# Patient Record
Sex: FEMALE | Race: WHITE | NOT HISPANIC OR LATINO | Employment: OTHER | URBAN - METROPOLITAN AREA
[De-identification: names, ages, dates, MRNs, and addresses within clinical notes are randomized per-mention and may not be internally consistent; named-entity substitution may affect disease eponyms.]

---

## 2017-01-10 ENCOUNTER — COMMUNICATION - HEALTHEAST (OUTPATIENT)
Dept: FAMILY MEDICINE | Facility: CLINIC | Age: 82
End: 2017-01-10

## 2017-01-11 ENCOUNTER — COMMUNICATION - HEALTHEAST (OUTPATIENT)
Dept: FAMILY MEDICINE | Facility: CLINIC | Age: 82
End: 2017-01-11

## 2017-01-31 ENCOUNTER — COMMUNICATION - HEALTHEAST (OUTPATIENT)
Dept: FAMILY MEDICINE | Facility: CLINIC | Age: 82
End: 2017-01-31

## 2017-01-31 ENCOUNTER — RECORDS - HEALTHEAST (OUTPATIENT)
Dept: ADMINISTRATIVE | Facility: OTHER | Age: 82
End: 2017-01-31

## 2017-02-02 ENCOUNTER — OFFICE VISIT - HEALTHEAST (OUTPATIENT)
Dept: FAMILY MEDICINE | Facility: CLINIC | Age: 82
End: 2017-02-02

## 2017-02-02 DIAGNOSIS — J45.40 ASTHMA, CHRONIC, MODERATE PERSISTENT, UNCOMPLICATED: ICD-10-CM

## 2017-02-02 DIAGNOSIS — R22.1 LOCALIZED SWELLING, MASS AND LUMP, NECK: ICD-10-CM

## 2017-02-02 DIAGNOSIS — I10 HYPERTENSION: ICD-10-CM

## 2017-02-02 ASSESSMENT — MIFFLIN-ST. JEOR: SCORE: 824.07

## 2017-02-03 ENCOUNTER — COMMUNICATION - HEALTHEAST (OUTPATIENT)
Dept: FAMILY MEDICINE | Facility: CLINIC | Age: 82
End: 2017-02-03

## 2017-02-07 ENCOUNTER — COMMUNICATION - HEALTHEAST (OUTPATIENT)
Dept: FAMILY MEDICINE | Facility: CLINIC | Age: 82
End: 2017-02-07

## 2017-02-07 ENCOUNTER — HOSPITAL ENCOUNTER (OUTPATIENT)
Dept: ULTRASOUND IMAGING | Facility: HOSPITAL | Age: 82
Discharge: HOME | End: 2017-02-07
Attending: FAMILY MEDICINE

## 2017-02-07 DIAGNOSIS — R22.1 LOCALIZED SWELLING, MASS AND LUMP, NECK: ICD-10-CM

## 2017-02-28 ENCOUNTER — COMMUNICATION - HEALTHEAST (OUTPATIENT)
Dept: FAMILY MEDICINE | Facility: CLINIC | Age: 82
End: 2017-02-28

## 2017-03-07 ENCOUNTER — OFFICE VISIT - HEALTHEAST (OUTPATIENT)
Dept: FAMILY MEDICINE | Facility: CLINIC | Age: 82
End: 2017-03-07

## 2017-03-07 DIAGNOSIS — I10 ESSENTIAL HYPERTENSION WITH GOAL BLOOD PRESSURE LESS THAN 130/80: ICD-10-CM

## 2017-03-07 ASSESSMENT — MIFFLIN-ST. JEOR: SCORE: 824.07

## 2017-03-21 ENCOUNTER — COMMUNICATION - HEALTHEAST (OUTPATIENT)
Dept: FAMILY MEDICINE | Facility: CLINIC | Age: 82
End: 2017-03-21

## 2017-03-21 DIAGNOSIS — J45.40 ASTHMA, CHRONIC, MODERATE PERSISTENT, UNCOMPLICATED: ICD-10-CM

## 2017-04-25 ENCOUNTER — AMBULATORY - HEALTHEAST (OUTPATIENT)
Dept: ENDOCRINOLOGY | Facility: CLINIC | Age: 82
End: 2017-04-25

## 2017-04-25 DIAGNOSIS — M81.0 OSTEOPOROSIS: ICD-10-CM

## 2017-05-03 ENCOUNTER — RECORDS - HEALTHEAST (OUTPATIENT)
Dept: BONE DENSITY | Facility: CLINIC | Age: 82
End: 2017-05-03

## 2017-05-03 ENCOUNTER — RECORDS - HEALTHEAST (OUTPATIENT)
Dept: ADMINISTRATIVE | Facility: OTHER | Age: 82
End: 2017-05-03

## 2017-05-03 DIAGNOSIS — M81.0 AGE-RELATED OSTEOPOROSIS WITHOUT CURRENT PATHOLOGICAL FRACTURE: ICD-10-CM

## 2017-05-09 ENCOUNTER — AMBULATORY - HEALTHEAST (OUTPATIENT)
Dept: ENDOCRINOLOGY | Facility: CLINIC | Age: 82
End: 2017-05-09

## 2017-05-09 ENCOUNTER — COMMUNICATION - HEALTHEAST (OUTPATIENT)
Dept: ADMINISTRATIVE | Facility: CLINIC | Age: 82
End: 2017-05-09

## 2017-05-09 ENCOUNTER — OFFICE VISIT - HEALTHEAST (OUTPATIENT)
Dept: ENDOCRINOLOGY | Facility: CLINIC | Age: 82
End: 2017-05-09

## 2017-05-09 DIAGNOSIS — M81.0 OSTEOPOROSIS: ICD-10-CM

## 2017-05-09 DIAGNOSIS — E21.3 HYPERPARATHYROIDISM, UNSPECIFIED (H): ICD-10-CM

## 2017-05-09 ASSESSMENT — MIFFLIN-ST. JEOR: SCORE: 805.93

## 2017-05-17 ENCOUNTER — INFUSION - HEALTHEAST (OUTPATIENT)
Dept: INFUSION THERAPY | Facility: HOSPITAL | Age: 82
End: 2017-05-17

## 2017-05-17 DIAGNOSIS — M81.0 OSTEOPOROSIS: ICD-10-CM

## 2017-05-17 LAB
CREAT SERPL-MCNC: 0.69 MG/DL (ref 0.6–1.1)
GFR SERPL CREATININE-BSD FRML MDRD: >60 ML/MIN/1.73M2

## 2017-05-30 ENCOUNTER — COMMUNICATION - HEALTHEAST (OUTPATIENT)
Dept: ENDOCRINOLOGY | Facility: CLINIC | Age: 82
End: 2017-05-30

## 2017-06-07 ENCOUNTER — COMMUNICATION - HEALTHEAST (OUTPATIENT)
Dept: FAMILY MEDICINE | Facility: CLINIC | Age: 82
End: 2017-06-07

## 2017-06-07 DIAGNOSIS — J45.40 ASTHMA, CHRONIC, MODERATE PERSISTENT, UNCOMPLICATED: ICD-10-CM

## 2017-06-12 ENCOUNTER — COMMUNICATION - HEALTHEAST (OUTPATIENT)
Dept: FAMILY MEDICINE | Facility: CLINIC | Age: 82
End: 2017-06-12

## 2017-06-13 ENCOUNTER — HOME CARE/HOSPICE - HEALTHEAST (OUTPATIENT)
Dept: HOME HEALTH SERVICES | Facility: HOME HEALTH | Age: 82
End: 2017-06-13

## 2017-06-14 ENCOUNTER — COMMUNICATION - HEALTHEAST (OUTPATIENT)
Dept: FAMILY MEDICINE | Facility: CLINIC | Age: 82
End: 2017-06-14

## 2017-06-15 ENCOUNTER — COMMUNICATION - HEALTHEAST (OUTPATIENT)
Dept: HOME HEALTH SERVICES | Facility: HOME HEALTH | Age: 82
End: 2017-06-15

## 2017-06-16 ENCOUNTER — COMMUNICATION - HEALTHEAST (OUTPATIENT)
Dept: INTENSIVE CARE | Facility: CLINIC | Age: 82
End: 2017-06-16

## 2017-06-16 ENCOUNTER — COMMUNICATION - HEALTHEAST (OUTPATIENT)
Dept: FAMILY MEDICINE | Facility: CLINIC | Age: 82
End: 2017-06-16

## 2017-06-27 ENCOUNTER — RECORDS - HEALTHEAST (OUTPATIENT)
Dept: ADMINISTRATIVE | Facility: OTHER | Age: 82
End: 2017-06-27

## 2017-07-05 ENCOUNTER — COMMUNICATION - HEALTHEAST (OUTPATIENT)
Dept: FAMILY MEDICINE | Facility: CLINIC | Age: 82
End: 2017-07-05

## 2017-07-05 DIAGNOSIS — M54.50 LUMBAGO: ICD-10-CM

## 2017-07-20 ENCOUNTER — COMMUNICATION - HEALTHEAST (OUTPATIENT)
Dept: FAMILY MEDICINE | Facility: CLINIC | Age: 82
End: 2017-07-20

## 2017-07-20 DIAGNOSIS — J45.909 ASTHMA: ICD-10-CM

## 2017-07-23 ENCOUNTER — COMMUNICATION - HEALTHEAST (OUTPATIENT)
Dept: FAMILY MEDICINE | Facility: CLINIC | Age: 82
End: 2017-07-23

## 2017-07-23 DIAGNOSIS — M54.50 LUMBAGO: ICD-10-CM

## 2017-08-02 ENCOUNTER — COMMUNICATION - HEALTHEAST (OUTPATIENT)
Dept: FAMILY MEDICINE | Facility: CLINIC | Age: 82
End: 2017-08-02

## 2017-08-02 DIAGNOSIS — M54.50 LUMBAGO: ICD-10-CM

## 2017-08-08 ENCOUNTER — OFFICE VISIT - HEALTHEAST (OUTPATIENT)
Dept: FAMILY MEDICINE | Facility: CLINIC | Age: 82
End: 2017-08-08

## 2017-08-08 DIAGNOSIS — H91.93 HEARING LOSS, BILATERAL: ICD-10-CM

## 2017-08-08 DIAGNOSIS — H61.23 BILATERAL IMPACTED CERUMEN: ICD-10-CM

## 2017-10-04 ENCOUNTER — AMBULATORY - HEALTHEAST (OUTPATIENT)
Dept: NURSING | Facility: CLINIC | Age: 82
End: 2017-10-04

## 2017-10-04 DIAGNOSIS — Z23 NEED FOR IMMUNIZATION AGAINST INFLUENZA: ICD-10-CM

## 2017-10-07 ENCOUNTER — COMMUNICATION - HEALTHEAST (OUTPATIENT)
Dept: FAMILY MEDICINE | Facility: CLINIC | Age: 82
End: 2017-10-07

## 2017-10-07 DIAGNOSIS — M54.50 LUMBAGO: ICD-10-CM

## 2017-10-25 ENCOUNTER — RECORDS - HEALTHEAST (OUTPATIENT)
Dept: ADMINISTRATIVE | Facility: OTHER | Age: 82
End: 2017-10-25

## 2017-11-03 ENCOUNTER — COMMUNICATION - HEALTHEAST (OUTPATIENT)
Dept: FAMILY MEDICINE | Facility: CLINIC | Age: 82
End: 2017-11-03

## 2017-11-03 DIAGNOSIS — J45.40 ASTHMA, CHRONIC, MODERATE PERSISTENT, UNCOMPLICATED: ICD-10-CM

## 2017-11-07 ENCOUNTER — COMMUNICATION - HEALTHEAST (OUTPATIENT)
Dept: FAMILY MEDICINE | Facility: CLINIC | Age: 82
End: 2017-11-07

## 2017-11-07 DIAGNOSIS — J45.40 ASTHMA, CHRONIC, MODERATE PERSISTENT, UNCOMPLICATED: ICD-10-CM

## 2017-11-22 ENCOUNTER — COMMUNICATION - HEALTHEAST (OUTPATIENT)
Dept: FAMILY MEDICINE | Facility: CLINIC | Age: 82
End: 2017-11-22

## 2017-11-22 DIAGNOSIS — M54.50 LUMBAGO: ICD-10-CM

## 2017-12-20 ENCOUNTER — COMMUNICATION - HEALTHEAST (OUTPATIENT)
Dept: FAMILY MEDICINE | Facility: CLINIC | Age: 82
End: 2017-12-20

## 2017-12-20 DIAGNOSIS — M54.50 LUMBAGO: ICD-10-CM

## 2017-12-22 ENCOUNTER — COMMUNICATION - HEALTHEAST (OUTPATIENT)
Dept: FAMILY MEDICINE | Facility: CLINIC | Age: 82
End: 2017-12-22

## 2017-12-22 DIAGNOSIS — J45.40 ASTHMA, CHRONIC, MODERATE PERSISTENT, UNCOMPLICATED: ICD-10-CM

## 2018-01-13 ENCOUNTER — COMMUNICATION - HEALTHEAST (OUTPATIENT)
Dept: FAMILY MEDICINE | Facility: CLINIC | Age: 83
End: 2018-01-13

## 2018-01-13 DIAGNOSIS — J45.40 ASTHMA, CHRONIC, MODERATE PERSISTENT, UNCOMPLICATED: ICD-10-CM

## 2018-01-22 ENCOUNTER — COMMUNICATION - HEALTHEAST (OUTPATIENT)
Dept: FAMILY MEDICINE | Facility: CLINIC | Age: 83
End: 2018-01-22

## 2018-01-22 DIAGNOSIS — M54.50 LUMBAGO: ICD-10-CM

## 2018-02-08 ENCOUNTER — COMMUNICATION - HEALTHEAST (OUTPATIENT)
Dept: FAMILY MEDICINE | Facility: CLINIC | Age: 83
End: 2018-02-08

## 2018-02-08 DIAGNOSIS — J45.40 ASTHMA, CHRONIC, MODERATE PERSISTENT, UNCOMPLICATED: ICD-10-CM

## 2018-03-07 ENCOUNTER — COMMUNICATION - HEALTHEAST (OUTPATIENT)
Dept: FAMILY MEDICINE | Facility: CLINIC | Age: 83
End: 2018-03-07

## 2018-03-07 DIAGNOSIS — I10 ESSENTIAL HYPERTENSION: ICD-10-CM

## 2018-03-08 ENCOUNTER — COMMUNICATION - HEALTHEAST (OUTPATIENT)
Dept: FAMILY MEDICINE | Facility: CLINIC | Age: 83
End: 2018-03-08

## 2018-03-08 DIAGNOSIS — J45.40 ASTHMA, CHRONIC, MODERATE PERSISTENT, UNCOMPLICATED: ICD-10-CM

## 2018-03-13 ENCOUNTER — COMMUNICATION - HEALTHEAST (OUTPATIENT)
Dept: FAMILY MEDICINE | Facility: CLINIC | Age: 83
End: 2018-03-13

## 2018-03-13 DIAGNOSIS — I10 ESSENTIAL HYPERTENSION: ICD-10-CM

## 2018-03-20 ENCOUNTER — OFFICE VISIT - HEALTHEAST (OUTPATIENT)
Dept: FAMILY MEDICINE | Facility: CLINIC | Age: 83
End: 2018-03-20

## 2018-03-20 ENCOUNTER — COMMUNICATION - HEALTHEAST (OUTPATIENT)
Dept: TELEHEALTH | Facility: CLINIC | Age: 83
End: 2018-03-20

## 2018-03-20 DIAGNOSIS — J44.9 CHRONIC OBSTRUCTIVE PULMONARY DISEASE, UNSPECIFIED COPD TYPE (H): ICD-10-CM

## 2018-03-20 DIAGNOSIS — E21.3 HYPERPARATHYROIDISM (H): ICD-10-CM

## 2018-03-20 DIAGNOSIS — I10 ESSENTIAL HYPERTENSION: ICD-10-CM

## 2018-03-20 DIAGNOSIS — R26.9 UNSPECIFIED ABNORMALITIES OF GAIT AND MOBILITY: ICD-10-CM

## 2018-03-20 DIAGNOSIS — K43.9 VENTRAL HERNIA WITHOUT OBSTRUCTION OR GANGRENE: ICD-10-CM

## 2018-03-20 DIAGNOSIS — J45.40 ASTHMA, CHRONIC, MODERATE PERSISTENT, UNCOMPLICATED: ICD-10-CM

## 2018-03-20 DIAGNOSIS — B44.81 ALLERGIC BRONCHOPULMONARY ASPERGILLOSIS (H): ICD-10-CM

## 2018-03-20 DIAGNOSIS — S06.9XAA TRAUMATIC BRAIN INJURY (H): ICD-10-CM

## 2018-03-20 DIAGNOSIS — J45.909 ASTHMA: ICD-10-CM

## 2018-03-20 DIAGNOSIS — N39.41 URGE INCONTINENCE: ICD-10-CM

## 2018-03-20 ASSESSMENT — MIFFLIN-ST. JEOR: SCORE: 783.81

## 2018-03-21 ENCOUNTER — COMMUNICATION - HEALTHEAST (OUTPATIENT)
Dept: ADMINISTRATIVE | Facility: CLINIC | Age: 83
End: 2018-03-21

## 2018-03-21 DIAGNOSIS — B44.81 ABPA (ALLERGIC BRONCHOPULMONARY ASPERGILLOSIS) (H): ICD-10-CM

## 2018-03-22 ENCOUNTER — AMBULATORY - HEALTHEAST (OUTPATIENT)
Dept: FAMILY MEDICINE | Facility: CLINIC | Age: 83
End: 2018-03-22

## 2018-03-22 DIAGNOSIS — B44.81 ABPA (ALLERGIC BRONCHOPULMONARY ASPERGILLOSIS) (H): ICD-10-CM

## 2018-04-16 ENCOUNTER — AMBULATORY - HEALTHEAST (OUTPATIENT)
Dept: SCHEDULING | Facility: CLINIC | Age: 83
End: 2018-04-16

## 2018-04-16 DIAGNOSIS — M81.0 OSTEOPOROSIS: ICD-10-CM

## 2018-06-11 ENCOUNTER — COMMUNICATION - HEALTHEAST (OUTPATIENT)
Dept: FAMILY MEDICINE | Facility: CLINIC | Age: 83
End: 2018-06-11

## 2018-06-11 ENCOUNTER — AMBULATORY - HEALTHEAST (OUTPATIENT)
Dept: MEDSURG UNIT | Facility: HOSPITAL | Age: 83
End: 2018-06-11

## 2018-06-11 ENCOUNTER — OFFICE VISIT - HEALTHEAST (OUTPATIENT)
Dept: FAMILY MEDICINE | Facility: CLINIC | Age: 83
End: 2018-06-11

## 2018-06-11 ENCOUNTER — AMBULATORY - HEALTHEAST (OUTPATIENT)
Dept: FAMILY MEDICINE | Facility: CLINIC | Age: 83
End: 2018-06-11

## 2018-06-11 ENCOUNTER — HOME CARE/HOSPICE - HEALTHEAST (OUTPATIENT)
Dept: HOME HEALTH SERVICES | Facility: HOME HEALTH | Age: 83
End: 2018-06-11

## 2018-06-11 DIAGNOSIS — M79.605 LEFT LEG PAIN: ICD-10-CM

## 2018-06-11 DIAGNOSIS — M71.22 BAKER'S CYST OF KNEE, LEFT: ICD-10-CM

## 2018-06-11 DIAGNOSIS — M54.2 NECK PAIN: ICD-10-CM

## 2018-06-11 DIAGNOSIS — S43.001A SUBLUXATION OF RIGHT SHOULDER JOINT, INITIAL ENCOUNTER: ICD-10-CM

## 2018-06-11 DIAGNOSIS — M79.601 RIGHT ARM PAIN: ICD-10-CM

## 2018-06-11 DIAGNOSIS — S06.9XAA TRAUMATIC BRAIN INJURY (H): ICD-10-CM

## 2018-06-11 DIAGNOSIS — M25.511 RIGHT SHOULDER PAIN: ICD-10-CM

## 2018-06-12 ENCOUNTER — COMMUNICATION - HEALTHEAST (OUTPATIENT)
Dept: FAMILY MEDICINE | Facility: CLINIC | Age: 83
End: 2018-06-12

## 2018-06-14 ENCOUNTER — COMMUNICATION - HEALTHEAST (OUTPATIENT)
Dept: HOME HEALTH SERVICES | Facility: HOME HEALTH | Age: 83
End: 2018-06-14

## 2018-06-15 ENCOUNTER — HOME CARE/HOSPICE - HEALTHEAST (OUTPATIENT)
Dept: HOME HEALTH SERVICES | Facility: HOME HEALTH | Age: 83
End: 2018-06-15

## 2018-06-15 ENCOUNTER — COMMUNICATION - HEALTHEAST (OUTPATIENT)
Dept: HOME HEALTH SERVICES | Facility: HOME HEALTH | Age: 83
End: 2018-06-15

## 2018-06-19 ENCOUNTER — RECORDS - HEALTHEAST (OUTPATIENT)
Dept: ADMINISTRATIVE | Facility: OTHER | Age: 83
End: 2018-06-19

## 2018-06-21 ENCOUNTER — HOME CARE/HOSPICE - HEALTHEAST (OUTPATIENT)
Dept: HOME HEALTH SERVICES | Facility: HOME HEALTH | Age: 83
End: 2018-06-21

## 2018-06-22 ENCOUNTER — HOME CARE/HOSPICE - HEALTHEAST (OUTPATIENT)
Dept: HOME HEALTH SERVICES | Facility: HOME HEALTH | Age: 83
End: 2018-06-22

## 2018-06-25 ENCOUNTER — HOME CARE/HOSPICE - HEALTHEAST (OUTPATIENT)
Dept: HOME HEALTH SERVICES | Facility: HOME HEALTH | Age: 83
End: 2018-06-25

## 2018-06-27 ENCOUNTER — RECORDS - HEALTHEAST (OUTPATIENT)
Dept: ADMINISTRATIVE | Facility: OTHER | Age: 83
End: 2018-06-27

## 2018-07-03 ENCOUNTER — HOSPITAL ENCOUNTER (OUTPATIENT)
Dept: RADIOLOGY | Facility: HOSPITAL | Age: 83
Discharge: HOME | End: 2018-07-03

## 2018-07-03 ENCOUNTER — HOSPITAL ENCOUNTER (OUTPATIENT)
Dept: MRI IMAGING | Facility: HOSPITAL | Age: 83
Discharge: HOME | End: 2018-07-03
Admitting: RADIOLOGY

## 2018-07-03 DIAGNOSIS — M81.0 OSTEOPOROSIS: ICD-10-CM

## 2018-07-03 DIAGNOSIS — M19.011 OSTEOARTHRITIS OF RIGHT SHOULDER: ICD-10-CM

## 2018-07-03 DIAGNOSIS — M25.562 LEFT KNEE PAIN: ICD-10-CM

## 2018-07-09 ENCOUNTER — COMMUNICATION - HEALTHEAST (OUTPATIENT)
Dept: FAMILY MEDICINE | Facility: CLINIC | Age: 83
End: 2018-07-09

## 2018-07-10 ENCOUNTER — HOME CARE/HOSPICE - HEALTHEAST (OUTPATIENT)
Dept: HOME HEALTH SERVICES | Facility: HOME HEALTH | Age: 83
End: 2018-07-10

## 2018-07-15 ENCOUNTER — COMMUNICATION - HEALTHEAST (OUTPATIENT)
Dept: FAMILY MEDICINE | Facility: CLINIC | Age: 83
End: 2018-07-15

## 2018-07-15 DIAGNOSIS — J45.909 ASTHMA: ICD-10-CM

## 2018-07-31 ENCOUNTER — COMMUNICATION - HEALTHEAST (OUTPATIENT)
Dept: FAMILY MEDICINE | Facility: CLINIC | Age: 83
End: 2018-07-31

## 2018-07-31 DIAGNOSIS — J45.40 ASTHMA, CHRONIC, MODERATE PERSISTENT, UNCOMPLICATED: ICD-10-CM

## 2018-08-02 ENCOUNTER — HOME CARE/HOSPICE - HEALTHEAST (OUTPATIENT)
Dept: HOME HEALTH SERVICES | Facility: HOME HEALTH | Age: 83
End: 2018-08-02

## 2018-08-02 ENCOUNTER — RECORDS - HEALTHEAST (OUTPATIENT)
Dept: ADMINISTRATIVE | Facility: OTHER | Age: 83
End: 2018-08-02

## 2018-08-03 ENCOUNTER — HOME CARE/HOSPICE - HEALTHEAST (OUTPATIENT)
Dept: HOME HEALTH SERVICES | Facility: HOME HEALTH | Age: 83
End: 2018-08-03

## 2018-08-07 ENCOUNTER — HOME CARE/HOSPICE - HEALTHEAST (OUTPATIENT)
Dept: HOME HEALTH SERVICES | Facility: HOME HEALTH | Age: 83
End: 2018-08-07

## 2018-08-10 ENCOUNTER — HOME CARE/HOSPICE - HEALTHEAST (OUTPATIENT)
Dept: HOME HEALTH SERVICES | Facility: HOME HEALTH | Age: 83
End: 2018-08-10

## 2018-08-13 ENCOUNTER — HOME CARE/HOSPICE - HEALTHEAST (OUTPATIENT)
Dept: HOME HEALTH SERVICES | Facility: HOME HEALTH | Age: 83
End: 2018-08-13

## 2018-08-16 ENCOUNTER — HOME CARE/HOSPICE - HEALTHEAST (OUTPATIENT)
Dept: HOME HEALTH SERVICES | Facility: HOME HEALTH | Age: 83
End: 2018-08-16

## 2018-08-21 ENCOUNTER — HOME CARE/HOSPICE - HEALTHEAST (OUTPATIENT)
Dept: HOME HEALTH SERVICES | Facility: HOME HEALTH | Age: 83
End: 2018-08-21

## 2018-08-23 ENCOUNTER — HOME CARE/HOSPICE - HEALTHEAST (OUTPATIENT)
Dept: HOME HEALTH SERVICES | Facility: HOME HEALTH | Age: 83
End: 2018-08-23

## 2018-08-28 ENCOUNTER — HOME CARE/HOSPICE - HEALTHEAST (OUTPATIENT)
Dept: HOME HEALTH SERVICES | Facility: HOME HEALTH | Age: 83
End: 2018-08-28

## 2018-08-30 ENCOUNTER — COMMUNICATION - HEALTHEAST (OUTPATIENT)
Dept: SCHEDULING | Facility: CLINIC | Age: 83
End: 2018-08-30

## 2018-08-30 ENCOUNTER — COMMUNICATION - HEALTHEAST (OUTPATIENT)
Dept: FAMILY MEDICINE | Facility: CLINIC | Age: 83
End: 2018-08-30

## 2018-08-30 DIAGNOSIS — B44.81 ABPA (ALLERGIC BRONCHOPULMONARY ASPERGILLOSIS) (H): ICD-10-CM

## 2018-08-31 ENCOUNTER — HOME CARE/HOSPICE - HEALTHEAST (OUTPATIENT)
Dept: HOME HEALTH SERVICES | Facility: HOME HEALTH | Age: 83
End: 2018-08-31

## 2018-09-06 ENCOUNTER — HOME CARE/HOSPICE - HEALTHEAST (OUTPATIENT)
Dept: HOME HEALTH SERVICES | Facility: HOME HEALTH | Age: 83
End: 2018-09-06

## 2018-09-10 ENCOUNTER — COMMUNICATION - HEALTHEAST (OUTPATIENT)
Dept: PULMONOLOGY | Facility: OTHER | Age: 83
End: 2018-09-10

## 2018-09-10 ENCOUNTER — AMBULATORY - HEALTHEAST (OUTPATIENT)
Dept: PULMONOLOGY | Facility: OTHER | Age: 83
End: 2018-09-10

## 2018-09-10 DIAGNOSIS — J45.901 ASTHMA EXACERBATION: ICD-10-CM

## 2018-09-26 ENCOUNTER — AMBULATORY - HEALTHEAST (OUTPATIENT)
Dept: NURSING | Facility: CLINIC | Age: 83
End: 2018-09-26

## 2018-09-26 DIAGNOSIS — Z23 FLU VACCINE NEED: ICD-10-CM

## 2018-10-14 ENCOUNTER — COMMUNICATION - HEALTHEAST (OUTPATIENT)
Dept: FAMILY MEDICINE | Facility: CLINIC | Age: 83
End: 2018-10-14

## 2018-10-14 DIAGNOSIS — J45.40 ASTHMA, CHRONIC, MODERATE PERSISTENT, UNCOMPLICATED: ICD-10-CM

## 2018-11-02 ENCOUNTER — RECORDS - HEALTHEAST (OUTPATIENT)
Dept: ADMINISTRATIVE | Facility: OTHER | Age: 83
End: 2018-11-02

## 2018-11-02 ENCOUNTER — OFFICE VISIT - HEALTHEAST (OUTPATIENT)
Dept: PULMONOLOGY | Facility: OTHER | Age: 83
End: 2018-11-02

## 2018-11-02 DIAGNOSIS — B44.81 ABPA (ALLERGIC BRONCHOPULMONARY ASPERGILLOSIS) (H): ICD-10-CM

## 2018-11-02 ASSESSMENT — MIFFLIN-ST. JEOR: SCORE: 774.74

## 2018-11-05 ENCOUNTER — AMBULATORY - HEALTHEAST (OUTPATIENT)
Dept: PULMONOLOGY | Facility: OTHER | Age: 83
End: 2018-11-05

## 2018-11-15 ENCOUNTER — RECORDS - HEALTHEAST (OUTPATIENT)
Dept: RADIOLOGY | Facility: CLINIC | Age: 83
End: 2018-11-15

## 2018-12-02 ENCOUNTER — COMMUNICATION - HEALTHEAST (OUTPATIENT)
Dept: FAMILY MEDICINE | Facility: CLINIC | Age: 83
End: 2018-12-02

## 2018-12-02 DIAGNOSIS — J45.40 ASTHMA, CHRONIC, MODERATE PERSISTENT, UNCOMPLICATED: ICD-10-CM

## 2019-01-17 ENCOUNTER — OFFICE VISIT - HEALTHEAST (OUTPATIENT)
Dept: ALLERGY | Facility: CLINIC | Age: 84
End: 2019-01-17

## 2019-01-17 ENCOUNTER — AMBULATORY - HEALTHEAST (OUTPATIENT)
Dept: LAB | Facility: CLINIC | Age: 84
End: 2019-01-17

## 2019-01-17 DIAGNOSIS — B44.81 ABPA (ALLERGIC BRONCHOPULMONARY ASPERGILLOSIS) (H): ICD-10-CM

## 2019-01-17 DIAGNOSIS — J30.9 ALLERGIC RHINITIS: ICD-10-CM

## 2019-01-17 DIAGNOSIS — Z01.82 ENCOUNTER FOR ALLERGY TESTING: ICD-10-CM

## 2019-01-17 LAB
ALBUMIN SERPL-MCNC: 3.9 G/DL (ref 3.5–5)
ALP SERPL-CCNC: 101 U/L (ref 45–120)
ALT SERPL W P-5'-P-CCNC: 10 U/L (ref 0–45)
ANION GAP SERPL CALCULATED.3IONS-SCNC: 14 MMOL/L (ref 5–18)
AST SERPL W P-5'-P-CCNC: 17 U/L (ref 0–40)
BILIRUB SERPL-MCNC: 0.3 MG/DL (ref 0–1)
BUN SERPL-MCNC: 20 MG/DL (ref 8–28)
CALCIUM SERPL-MCNC: 10.5 MG/DL (ref 8.5–10.5)
CHLORIDE BLD-SCNC: 99 MMOL/L (ref 98–107)
CK SERPL-CCNC: 56 U/L (ref 30–190)
CO2 SERPL-SCNC: 27 MMOL/L (ref 22–31)
CREAT SERPL-MCNC: 0.6 MG/DL (ref 0.6–1.1)
GFR SERPL CREATININE-BSD FRML MDRD: >60 ML/MIN/1.73M2
GLUCOSE BLD-MCNC: 85 MG/DL (ref 70–125)
POTASSIUM BLD-SCNC: 4.2 MMOL/L (ref 3.5–5)
PROT SERPL-MCNC: 6.8 G/DL (ref 6–8)
SODIUM SERPL-SCNC: 140 MMOL/L (ref 136–145)

## 2019-01-17 ASSESSMENT — MIFFLIN-ST. JEOR: SCORE: 797.42

## 2019-01-18 LAB
A FUMIGATUS IGE QN: 42.8 KU/L
TOTAL IGE - HISTORICAL: 1690 KU/L (ref 0–100)

## 2019-01-21 ENCOUNTER — COMMUNICATION - HEALTHEAST (OUTPATIENT)
Dept: ALLERGY | Facility: CLINIC | Age: 84
End: 2019-01-21

## 2019-01-25 ENCOUNTER — OFFICE VISIT - HEALTHEAST (OUTPATIENT)
Dept: PULMONOLOGY | Facility: OTHER | Age: 84
End: 2019-01-25

## 2019-01-25 DIAGNOSIS — B44.81 ABPA (ALLERGIC BRONCHOPULMONARY ASPERGILLOSIS) (H): ICD-10-CM

## 2019-01-25 DIAGNOSIS — R05.9 COUGH: ICD-10-CM

## 2019-02-05 ENCOUNTER — COMMUNICATION - HEALTHEAST (OUTPATIENT)
Dept: PULMONOLOGY | Facility: OTHER | Age: 84
End: 2019-02-05

## 2019-02-10 ENCOUNTER — COMMUNICATION - HEALTHEAST (OUTPATIENT)
Dept: FAMILY MEDICINE | Facility: CLINIC | Age: 84
End: 2019-02-10

## 2019-02-10 DIAGNOSIS — J45.40 ASTHMA, CHRONIC, MODERATE PERSISTENT, UNCOMPLICATED: ICD-10-CM

## 2019-02-19 ENCOUNTER — COMMUNICATION - HEALTHEAST (OUTPATIENT)
Dept: PULMONOLOGY | Facility: OTHER | Age: 84
End: 2019-02-19

## 2019-02-27 ENCOUNTER — OFFICE VISIT - HEALTHEAST (OUTPATIENT)
Dept: FAMILY MEDICINE | Facility: CLINIC | Age: 84
End: 2019-02-27

## 2019-02-27 ENCOUNTER — HOME CARE/HOSPICE - HEALTHEAST (OUTPATIENT)
Dept: HOME HEALTH SERVICES | Facility: HOME HEALTH | Age: 84
End: 2019-02-27

## 2019-02-27 ENCOUNTER — COMMUNICATION - HEALTHEAST (OUTPATIENT)
Dept: HOME HEALTH SERVICES | Facility: HOME HEALTH | Age: 84
End: 2019-02-27

## 2019-02-27 DIAGNOSIS — R05.9 COUGH: ICD-10-CM

## 2019-02-27 DIAGNOSIS — B44.81 ABPA (ALLERGIC BRONCHOPULMONARY ASPERGILLOSIS) (H): ICD-10-CM

## 2019-02-27 DIAGNOSIS — R26.9 ABNORMAL GAIT: ICD-10-CM

## 2019-02-27 DIAGNOSIS — Z99.3 WHEELCHAIR DEPENDENCE: ICD-10-CM

## 2019-02-27 DIAGNOSIS — M62.81 MUSCLE WEAKNESS (GENERALIZED): ICD-10-CM

## 2019-02-27 DIAGNOSIS — S06.9X0D TRAUMATIC BRAIN INJURY, WITHOUT LOSS OF CONSCIOUSNESS, SUBSEQUENT ENCOUNTER: ICD-10-CM

## 2019-03-19 ENCOUNTER — AMBULATORY - HEALTHEAST (OUTPATIENT)
Dept: FAMILY MEDICINE | Facility: CLINIC | Age: 84
End: 2019-03-19

## 2019-03-19 DIAGNOSIS — R26.9 ABNORMAL GAIT: ICD-10-CM

## 2019-03-19 DIAGNOSIS — S06.9X0D TRAUMATIC BRAIN INJURY, WITHOUT LOSS OF CONSCIOUSNESS, SUBSEQUENT ENCOUNTER: ICD-10-CM

## 2019-03-26 ENCOUNTER — OFFICE VISIT - HEALTHEAST (OUTPATIENT)
Dept: FAMILY MEDICINE | Facility: CLINIC | Age: 84
End: 2019-03-26

## 2019-03-26 DIAGNOSIS — Z00.00 ROUTINE GENERAL MEDICAL EXAMINATION AT A HEALTH CARE FACILITY: ICD-10-CM

## 2019-03-26 DIAGNOSIS — J44.9 CHRONIC OBSTRUCTIVE PULMONARY DISEASE, UNSPECIFIED COPD TYPE (H): ICD-10-CM

## 2019-03-26 DIAGNOSIS — N39.41 URGE INCONTINENCE: ICD-10-CM

## 2019-03-26 DIAGNOSIS — M54.50 CHRONIC LOW BACK PAIN WITHOUT SCIATICA, UNSPECIFIED BACK PAIN LATERALITY: ICD-10-CM

## 2019-03-26 DIAGNOSIS — M81.0 OSTEOPOROSIS, UNSPECIFIED OSTEOPOROSIS TYPE, UNSPECIFIED PATHOLOGICAL FRACTURE PRESENCE: ICD-10-CM

## 2019-03-26 DIAGNOSIS — E21.3 HYPERPARATHYROIDISM (H): ICD-10-CM

## 2019-03-26 DIAGNOSIS — S06.9X0D TRAUMATIC BRAIN INJURY, WITHOUT LOSS OF CONSCIOUSNESS, SUBSEQUENT ENCOUNTER: ICD-10-CM

## 2019-03-26 DIAGNOSIS — B44.81 ALLERGIC BRONCHOPULMONARY ASPERGILLOSIS (H): ICD-10-CM

## 2019-03-26 DIAGNOSIS — R41.3 MEMORY LOSS: ICD-10-CM

## 2019-03-26 DIAGNOSIS — G89.29 CHRONIC LOW BACK PAIN WITHOUT SCIATICA, UNSPECIFIED BACK PAIN LATERALITY: ICD-10-CM

## 2019-03-26 DIAGNOSIS — K43.9 VENTRAL HERNIA WITHOUT OBSTRUCTION OR GANGRENE: ICD-10-CM

## 2019-03-26 LAB
BASOPHILS # BLD AUTO: 0 THOU/UL (ref 0–0.2)
BASOPHILS NFR BLD AUTO: 0 % (ref 0–2)
EOSINOPHIL # BLD AUTO: 0.1 THOU/UL (ref 0–0.4)
EOSINOPHIL NFR BLD AUTO: 2 % (ref 0–6)
ERYTHROCYTE [DISTWIDTH] IN BLOOD BY AUTOMATED COUNT: 11.5 % (ref 11–14.5)
HCT VFR BLD AUTO: 45 % (ref 35–47)
HGB BLD-MCNC: 15 G/DL (ref 12–16)
LYMPHOCYTES # BLD AUTO: 1.3 THOU/UL (ref 0.8–4.4)
LYMPHOCYTES NFR BLD AUTO: 24 % (ref 20–40)
MCH RBC QN AUTO: 32.1 PG (ref 27–34)
MCHC RBC AUTO-ENTMCNC: 33.2 G/DL (ref 32–36)
MCV RBC AUTO: 97 FL (ref 80–100)
MONOCYTES # BLD AUTO: 0.5 THOU/UL (ref 0–0.9)
MONOCYTES NFR BLD AUTO: 10 % (ref 2–10)
NEUTROPHILS # BLD AUTO: 3.4 THOU/UL (ref 2–7.7)
NEUTROPHILS NFR BLD AUTO: 64 % (ref 50–70)
PLATELET # BLD AUTO: 344 THOU/UL (ref 140–440)
PMV BLD AUTO: 6.7 FL (ref 7–10)
RBC # BLD AUTO: 4.66 MILL/UL (ref 3.8–5.4)
WBC: 5.3 THOU/UL (ref 4–11)

## 2019-03-28 ENCOUNTER — COMMUNICATION - HEALTHEAST (OUTPATIENT)
Dept: FAMILY MEDICINE | Facility: CLINIC | Age: 84
End: 2019-03-28

## 2019-04-08 ENCOUNTER — COMMUNICATION - HEALTHEAST (OUTPATIENT)
Dept: PULMONOLOGY | Facility: OTHER | Age: 84
End: 2019-04-08

## 2019-04-16 ENCOUNTER — OFFICE VISIT - HEALTHEAST (OUTPATIENT)
Dept: PULMONOLOGY | Facility: OTHER | Age: 84
End: 2019-04-16

## 2019-04-16 ENCOUNTER — AMBULATORY - HEALTHEAST (OUTPATIENT)
Dept: LAB | Facility: HOSPITAL | Age: 84
End: 2019-04-16

## 2019-04-16 DIAGNOSIS — B44.81 ABPA (ALLERGIC BRONCHOPULMONARY ASPERGILLOSIS) (H): ICD-10-CM

## 2019-04-16 LAB
ALBUMIN SERPL-MCNC: 3.9 G/DL (ref 3.5–5)
ALP SERPL-CCNC: 100 U/L (ref 45–120)
ALT SERPL W P-5'-P-CCNC: 17 U/L (ref 0–45)
ANION GAP SERPL CALCULATED.3IONS-SCNC: 10 MMOL/L (ref 5–18)
AST SERPL W P-5'-P-CCNC: 17 U/L (ref 0–40)
BILIRUB SERPL-MCNC: 0.5 MG/DL (ref 0–1)
BUN SERPL-MCNC: 20 MG/DL (ref 8–28)
CALCIUM SERPL-MCNC: 10.3 MG/DL (ref 8.5–10.5)
CHLORIDE BLD-SCNC: 100 MMOL/L (ref 98–107)
CK SERPL-CCNC: 63 U/L (ref 30–190)
CO2 SERPL-SCNC: 30 MMOL/L (ref 22–31)
CREAT SERPL-MCNC: 0.66 MG/DL (ref 0.6–1.1)
GFR SERPL CREATININE-BSD FRML MDRD: >60 ML/MIN/1.73M2
GLUCOSE BLD-MCNC: 87 MG/DL (ref 70–125)
POTASSIUM BLD-SCNC: 3.8 MMOL/L (ref 3.5–5)
PROT SERPL-MCNC: 6.8 G/DL (ref 6–8)
SODIUM SERPL-SCNC: 140 MMOL/L (ref 136–145)

## 2019-04-19 ENCOUNTER — COMMUNICATION - HEALTHEAST (OUTPATIENT)
Dept: PULMONOLOGY | Facility: OTHER | Age: 84
End: 2019-04-19

## 2019-04-19 DIAGNOSIS — B44.81 ABPA (ALLERGIC BRONCHOPULMONARY ASPERGILLOSIS) (H): ICD-10-CM

## 2019-04-19 LAB — TOTAL IGE - HISTORICAL: 1724 KU/L (ref 0–100)

## 2019-04-23 ENCOUNTER — RECORDS - HEALTHEAST (OUTPATIENT)
Dept: ADMINISTRATIVE | Facility: OTHER | Age: 84
End: 2019-04-23

## 2019-05-09 ENCOUNTER — COMMUNICATION - HEALTHEAST (OUTPATIENT)
Dept: FAMILY MEDICINE | Facility: CLINIC | Age: 84
End: 2019-05-09

## 2019-05-09 DIAGNOSIS — J45.909 ASTHMA: ICD-10-CM

## 2019-05-21 ENCOUNTER — AMBULATORY - HEALTHEAST (OUTPATIENT)
Dept: LAB | Facility: HOSPITAL | Age: 84
End: 2019-05-21

## 2019-05-21 DIAGNOSIS — B44.81 ABPA (ALLERGIC BRONCHOPULMONARY ASPERGILLOSIS) (H): ICD-10-CM

## 2019-05-21 LAB
ALBUMIN SERPL-MCNC: 4.1 G/DL (ref 3.5–5)
ALP SERPL-CCNC: 91 U/L (ref 45–120)
ALT SERPL W P-5'-P-CCNC: <9 U/L (ref 0–45)
ANION GAP SERPL CALCULATED.3IONS-SCNC: 11 MMOL/L (ref 5–18)
AST SERPL W P-5'-P-CCNC: 16 U/L (ref 0–40)
BILIRUB SERPL-MCNC: 0.4 MG/DL (ref 0–1)
BUN SERPL-MCNC: 17 MG/DL (ref 8–28)
CALCIUM SERPL-MCNC: 9.8 MG/DL (ref 8.5–10.5)
CHLORIDE BLD-SCNC: 101 MMOL/L (ref 98–107)
CK SERPL-CCNC: 65 U/L (ref 30–190)
CO2 SERPL-SCNC: 28 MMOL/L (ref 22–31)
CREAT SERPL-MCNC: 0.68 MG/DL (ref 0.6–1.1)
GFR SERPL CREATININE-BSD FRML MDRD: >60 ML/MIN/1.73M2
GLUCOSE BLD-MCNC: 93 MG/DL (ref 70–125)
POTASSIUM BLD-SCNC: 3.8 MMOL/L (ref 3.5–5)
PROT SERPL-MCNC: 7.1 G/DL (ref 6–8)
SODIUM SERPL-SCNC: 140 MMOL/L (ref 136–145)

## 2019-05-24 LAB — TOTAL IGE - HISTORICAL: 1824 KU/L (ref 0–100)

## 2019-05-28 ENCOUNTER — COMMUNICATION - HEALTHEAST (OUTPATIENT)
Dept: FAMILY MEDICINE | Facility: CLINIC | Age: 84
End: 2019-05-28

## 2019-05-28 ENCOUNTER — COMMUNICATION - HEALTHEAST (OUTPATIENT)
Dept: PULMONOLOGY | Facility: OTHER | Age: 84
End: 2019-05-28

## 2019-05-28 DIAGNOSIS — I10 ESSENTIAL HYPERTENSION: ICD-10-CM

## 2019-05-30 ENCOUNTER — AMBULATORY - HEALTHEAST (OUTPATIENT)
Dept: INTENSIVE CARE | Facility: CLINIC | Age: 84
End: 2019-05-30

## 2019-05-30 DIAGNOSIS — B44.81 ABPA (ALLERGIC BRONCHOPULMONARY ASPERGILLOSIS) (H): ICD-10-CM

## 2019-06-03 ENCOUNTER — COMMUNICATION - HEALTHEAST (OUTPATIENT)
Dept: FAMILY MEDICINE | Facility: CLINIC | Age: 84
End: 2019-06-03

## 2019-06-03 ENCOUNTER — COMMUNICATION - HEALTHEAST (OUTPATIENT)
Dept: PULMONOLOGY | Facility: OTHER | Age: 84
End: 2019-06-03

## 2019-06-06 ENCOUNTER — COMMUNICATION - HEALTHEAST (OUTPATIENT)
Dept: FAMILY MEDICINE | Facility: CLINIC | Age: 84
End: 2019-06-06

## 2019-06-07 ENCOUNTER — COMMUNICATION - HEALTHEAST (OUTPATIENT)
Dept: CARE COORDINATION | Facility: CLINIC | Age: 84
End: 2019-06-07

## 2019-06-12 ENCOUNTER — COMMUNICATION - HEALTHEAST (OUTPATIENT)
Dept: FAMILY MEDICINE | Facility: CLINIC | Age: 84
End: 2019-06-12

## 2019-06-14 ENCOUNTER — OFFICE VISIT - HEALTHEAST (OUTPATIENT)
Dept: PULMONOLOGY | Facility: OTHER | Age: 84
End: 2019-06-14

## 2019-06-14 DIAGNOSIS — B44.81 ABPA (ALLERGIC BRONCHOPULMONARY ASPERGILLOSIS) (H): ICD-10-CM

## 2019-06-14 ASSESSMENT — MIFFLIN-ST. JEOR: SCORE: 829.17

## 2019-07-15 ENCOUNTER — COMMUNICATION - HEALTHEAST (OUTPATIENT)
Dept: PULMONOLOGY | Facility: OTHER | Age: 84
End: 2019-07-15

## 2019-07-15 ENCOUNTER — COMMUNICATION - HEALTHEAST (OUTPATIENT)
Dept: INTENSIVE CARE | Facility: CLINIC | Age: 84
End: 2019-07-15

## 2019-08-07 ENCOUNTER — RECORDS - HEALTHEAST (OUTPATIENT)
Dept: ADMINISTRATIVE | Facility: OTHER | Age: 84
End: 2019-08-07

## 2019-08-07 ENCOUNTER — COMMUNICATION - HEALTHEAST (OUTPATIENT)
Dept: INTENSIVE CARE | Facility: CLINIC | Age: 84
End: 2019-08-07

## 2019-08-07 DIAGNOSIS — B44.81 ABPA (ALLERGIC BRONCHOPULMONARY ASPERGILLOSIS) (H): ICD-10-CM

## 2019-08-08 ENCOUNTER — COMMUNICATION - HEALTHEAST (OUTPATIENT)
Dept: PULMONOLOGY | Facility: OTHER | Age: 84
End: 2019-08-08

## 2019-08-09 ENCOUNTER — COMMUNICATION - HEALTHEAST (OUTPATIENT)
Dept: FAMILY MEDICINE | Facility: CLINIC | Age: 84
End: 2019-08-09

## 2019-08-09 ENCOUNTER — COMMUNICATION - HEALTHEAST (OUTPATIENT)
Dept: PULMONOLOGY | Facility: OTHER | Age: 84
End: 2019-08-09

## 2019-08-13 ENCOUNTER — AMBULATORY - HEALTHEAST (OUTPATIENT)
Dept: LAB | Facility: HOSPITAL | Age: 84
End: 2019-08-13

## 2019-08-13 DIAGNOSIS — B44.81 ABPA (ALLERGIC BRONCHOPULMONARY ASPERGILLOSIS) (H): ICD-10-CM

## 2019-08-13 LAB
ALBUMIN SERPL-MCNC: 3.9 G/DL (ref 3.5–5)
ALP SERPL-CCNC: 129 U/L (ref 45–120)
ALT SERPL W P-5'-P-CCNC: 58 U/L (ref 0–45)
ANION GAP SERPL CALCULATED.3IONS-SCNC: 9 MMOL/L (ref 5–18)
AST SERPL W P-5'-P-CCNC: 68 U/L (ref 0–40)
BILIRUB SERPL-MCNC: 0.5 MG/DL (ref 0–1)
BUN SERPL-MCNC: 13 MG/DL (ref 8–28)
CALCIUM SERPL-MCNC: 10.2 MG/DL (ref 8.5–10.5)
CHLORIDE BLD-SCNC: 103 MMOL/L (ref 98–107)
CK SERPL-CCNC: 52 U/L (ref 30–190)
CO2 SERPL-SCNC: 29 MMOL/L (ref 22–31)
CREAT SERPL-MCNC: 0.64 MG/DL (ref 0.6–1.1)
GFR SERPL CREATININE-BSD FRML MDRD: >60 ML/MIN/1.73M2
GLUCOSE BLD-MCNC: 92 MG/DL (ref 70–125)
POTASSIUM BLD-SCNC: 4 MMOL/L (ref 3.5–5)
PROT SERPL-MCNC: 6.7 G/DL (ref 6–8)
SODIUM SERPL-SCNC: 141 MMOL/L (ref 136–145)

## 2019-08-15 ENCOUNTER — RECORDS - HEALTHEAST (OUTPATIENT)
Dept: ADMINISTRATIVE | Facility: OTHER | Age: 84
End: 2019-08-15

## 2019-08-16 LAB — TOTAL IGE - HISTORICAL: 472 KU/L (ref 0–100)

## 2019-08-27 ENCOUNTER — COMMUNICATION - HEALTHEAST (OUTPATIENT)
Dept: FAMILY MEDICINE | Facility: CLINIC | Age: 84
End: 2019-08-27

## 2019-08-27 DIAGNOSIS — M62.81 GENERALIZED MUSCLE WEAKNESS: ICD-10-CM

## 2019-08-27 DIAGNOSIS — R26.9 ABNORMAL GAIT: ICD-10-CM

## 2019-08-29 ENCOUNTER — COMMUNICATION - HEALTHEAST (OUTPATIENT)
Dept: FAMILY MEDICINE | Facility: CLINIC | Age: 84
End: 2019-08-29

## 2019-08-29 DIAGNOSIS — J45.40 ASTHMA, CHRONIC, MODERATE PERSISTENT, UNCOMPLICATED: ICD-10-CM

## 2019-09-09 ENCOUNTER — COMMUNICATION - HEALTHEAST (OUTPATIENT)
Dept: PULMONOLOGY | Facility: OTHER | Age: 84
End: 2019-09-09

## 2020-01-29 ENCOUNTER — COMMUNICATION - HEALTHEAST (OUTPATIENT)
Dept: FAMILY MEDICINE | Facility: CLINIC | Age: 85
End: 2020-01-29

## 2020-01-29 DIAGNOSIS — J45.40 ASTHMA, CHRONIC, MODERATE PERSISTENT, UNCOMPLICATED: ICD-10-CM

## 2020-07-26 ENCOUNTER — COMMUNICATION - HEALTHEAST (OUTPATIENT)
Dept: FAMILY MEDICINE | Facility: CLINIC | Age: 85
End: 2020-07-26

## 2020-07-26 DIAGNOSIS — I10 ESSENTIAL HYPERTENSION: ICD-10-CM

## 2020-09-15 ENCOUNTER — RECORDS - HEALTHEAST (OUTPATIENT)
Dept: ADMINISTRATIVE | Facility: OTHER | Age: 85
End: 2020-09-15

## 2021-05-15 ENCOUNTER — COMMUNICATION - HEALTHEAST (OUTPATIENT)
Dept: SCHEDULING | Facility: CLINIC | Age: 86
End: 2021-05-15

## 2021-05-25 ENCOUNTER — RECORDS - HEALTHEAST (OUTPATIENT)
Dept: ADMINISTRATIVE | Facility: CLINIC | Age: 86
End: 2021-05-25

## 2021-05-26 ENCOUNTER — RECORDS - HEALTHEAST (OUTPATIENT)
Dept: ADMINISTRATIVE | Facility: CLINIC | Age: 86
End: 2021-05-26

## 2021-05-27 ENCOUNTER — RECORDS - HEALTHEAST (OUTPATIENT)
Dept: ADMINISTRATIVE | Facility: CLINIC | Age: 86
End: 2021-05-27

## 2021-05-27 NOTE — TELEPHONE ENCOUNTER
Call from patient's daughter, Nora. States her mom had her 1 month labs drawn on 3/26 at Dr. Mata's office.  Asking if she should continue taking the itraconazole or stop??    However, looks as though they did not draw the labs that were ordered by Dr. Gray.      Daughter asking if they should resume the itraconazole?? (she has been off of it about 1 1/2 weeks now).    Or wait for office visit on 4/16 with Dr. Gray and have the correct labs drawn??   Daughter states that her mom is feeling well.  She has had not problems, no flares at all.

## 2021-05-27 NOTE — TELEPHONE ENCOUNTER
In the chart prep process I noticed these labs have still not been done. I spoke with Nora again and the patient no longer is taking that medication since they were waiting for the word on the blood tests that we found out weren't drawn.  Do you still want the labs done before the visit on the 16th? CC:  TESSA ANTON POOL

## 2021-05-27 NOTE — PROGRESS NOTES
Assessment and Plan:  Veronika Hernandez is an 83 y.o. 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  She presented to our clinic Nov 2018 to transfer her pulmonary care here after her previous [facility] pulmonologist has retired.  Over the past 10 years since she was diagnosed with ABPA, she has had on average about 1 hospitalization and 3-4 outpatient prednisone burst treatments per year for ABPA flares.       Her ABPA diagnosis was confirmed with a repeat total IgE level greater than 1000, and an elevated Aspergillus specific IgE.  Allergy clinic could not perform skin prick testing due to the patient's thin skin. Baseline CK, liver & kidney function tests were normal.  After I discussed the pros and cons of trying antifungal therapy to decrease the number of flareups of ABPA extensively with the patient and her daughter, they discussed it with other family members and decided to proceed with antifungal therapy     1) ABPA: The patient started taking itraconazole for a month and experienced no side effects from it.  Before starting the itraconazole, she began having an ABPA/asthma flare, for which she started a course of prednisone at the same time as starting her itraconazole.  The patient's daughter states that the course seemed much shorter and less severe than previous flares which she had treated with prednisone alone.  Unfortunately, the patient's follow-up liver & kidney labs were not drawn as ordered.  They were drawn this morning and are normal, however she has been off of the itraconazole for 3 weeks now due to having only been given 1 month of initial supply.   -Resume itraconazole 100 mg p.o. Daily for 30 days, will provide 1 refill to avoid missing doses   -Repeat CMP, CK, and IgE in 1 month with pulmonary clinic follow-up     2) COPD   - Continue Asmanex and albuterol inhaler as needed    Follow-up in our  clinic in 4-6 weeks to review labs on itraconazole and consider ongoing treatment at that time    CCx: Recurrent ABPA flares    HPI: Patient states that her breathing currently feels very well.  She has no other acute complaints.  She took the itraconazole for a month and experienced no side effects from it.  Before starting the itraconazole, she began having an ABPA/asthma flare, for which she started a course of prednisone at the same time as starting her itraconazole.  The patient's daughter states that the course seemed much shorter and less severe than previous flares which she had treated with prednisone alone.    ROS:  A review of 12 organ systems was performed with pertinent positives and negatives noted in the HPI.      Current Meds:  Current Outpatient Medications   Medication Sig     acetaminophen (TYLENOL) 325 MG tablet Take 2 tablets (650 mg total) by mouth every 6 (six) hours.     albuterol (PROAIR HFA) 90 mcg/actuation inhaler Inhale 2 puffs every 4 (four) hours as needed for wheezing.     albuterol (PROVENTIL) 2.5 mg /3 mL (0.083 %) nebulizer solution INHALE ONE VIAL VIA NEBULIZER EVERY FOUR HOURS AS NEEDED     amLODIPine (NORVASC) 5 MG tablet Take 1 tablet (5 mg total) by mouth daily.     ASMANEX TWISTHALER 220 mcg (30 doses) AePB inhaler INHALE 1 PUFF DAILY.     calcium carbonate-vitamin D3 (CALCIUM 600 + D,3,) 600 mg(1,500mg) -400 unit per tablet Take 2 tablets by mouth daily with supper.     cholecalciferol, vitamin D3, 2,000 unit cap Take 1 capsule by mouth daily.     itraconazole (SPORANOX) 100 mg capsule Take 1 capsule (100 mg total) by mouth daily.     mometasone (ASMANEX TWISTHALER) 220 mcg (30 doses) AePB inhaler Inhale 1 puff daily.     predniSONE (DELTASONE) 10 mg tablet Take 2 tabs (20 mg ) po q day for 7 days, then take 1 tab(10 mg) po q day for 7 days then stop.       Labs:  Recent Results (from the past 72 hour(s))   Comprehensive Metabolic Panel   Result Value Ref Range    Sodium 140  136 - 145 mmol/L    Potassium 3.8 3.5 - 5.0 mmol/L    Chloride 100 98 - 107 mmol/L    CO2 30 22 - 31 mmol/L    Anion Gap, Calculation 10 5 - 18 mmol/L    Glucose 87 70 - 125 mg/dL    BUN 20 8 - 28 mg/dL    Creatinine 0.66 0.60 - 1.10 mg/dL    GFR MDRD Af Amer >60 >60 mL/min/1.73m2    GFR MDRD Non Af Amer >60 >60 mL/min/1.73m2    Bilirubin, Total 0.5 0.0 - 1.0 mg/dL    Calcium 10.3 8.5 - 10.5 mg/dL    Protein, Total 6.8 6.0 - 8.0 g/dL    Albumin 3.9 3.5 - 5.0 g/dL    Alkaline Phosphatase 100 45 - 120 U/L    AST 17 0 - 40 U/L    ALT 17 0 - 45 U/L   CK Total   Result Value Ref Range    CK, Total 63 30 - 190 U/L       I have personally reviewed all pertinent imaging studies and PFT results unless otherwise noted.    Imaging studies:  Xr Injection Major Joint    Result Date: 7/3/2018  1. RIGHT SHOULDER JOINT INJECTION 2. FLUOROSCOPIC GUIDANCE 7/3/2018 11:59 AM INDICATION: Pain. PROCEDURE: Procedure and risks explained and consent received. The skin was prepped and draped in sterile fashion. 5 mL 1% lidocaine infused in local soft tissues. Under direct fluoroscopic guidance, a 22 gauge spinal needle was introduced into the joint space. Position was confirmed with injection of nonionic contrast material. INJECTION: 1 mL of 80 mg per mL of Depo-Medrol.  4 mL of 0.5% bupivacaine 5 mg/mL . COMPLICATIONS: None. SPECIMEN: None. RADIOLOGIC SUPERVISION AND INTERPRETATION: Fluoroscopy demonstrates intraarticular needle tip position. FLUOROSCOPIC TIME: 1.4 minutes. NUMBER OF IMAGES: 2.     CONCLUSION: Fluoroscopic-guided right shoulder joint injection.    Mr Knee Without Contrast Left    Result Date: 7/3/2018  [facility] MR KNEE WO CONTRAST LEFT 7/3/2018 10:41 AM                                                     INDICATION: Pain in left knee. TECHNIQUE: Unenhanced. COMPARISON: None. FINDINGS: MEDIAL COMPARTMENT: The medial meniscus is intact. Articular cartilage smooth and intact. LATERAL COMPARTMENT: The lateral  meniscus is intact. Moderate-sized area of full-thickness articular cartilage loss along the posterior undersurface of the lateral femoral condyle with associated subchondral edema. PATELLOFEMORAL COMPARTMENT: Patella is centrally seated. There is a moderate-sized, complex popliteal cyst with some fluid extending caudally from the cyst margin suggesting recent rupture. Small knee joint effusion without evidence of intra-articular loose bodies. Advanced chondromalacia patella with full-thickness articular cartilage loss involving the medial and lateral patellar facets and median patellar ridge and broad-based areas of deep partial-thickness articular cartilage loss in the lateral portion of the trochlear groove. LIGAMENTS: Cruciate ligaments are intact. Collateral ligaments are intact. POSTEROMEDIAL CORNER: Distal semimembranosus tendon intact. Pes anserine tendons intact. POSTEROLATERAL CORNER: Popliteus tendon intact. Distal biceps tendon intact. EXTENSOR MECHANISM: Extensor tendons are intact. Retinacula are intact. BONES AND SOFT TISSUES: Tiny acute nondisplaced subchondral fracture of the lateral tibial plateau as seen on series 6 image 12 and series 5 image 18.     CONCLUSION: 1.  Tiny acute subchondral fracture of the lateral tibial plateau. 2.  Advanced chondromalacia patella. 3.  Moderate-sized, complex popliteal cyst with fluid extending caudally from the cyst margin suggesting recent rupture. 4.  Moderate-sized area of full-thickness articular cartilage loss posterior undersurface of the lateral femoral condyle.         Physical Exam:  /80   Pulse 77   SpO2 93%   General - Well nourished  Ears/Mouth -  OP pink moist, no thrush  Neck - no cervical lymphadenopathy  Lungs - Clear to ausculation bilaterally   CVS - regular rhythm with no murmurs, rubs or gallups  Abdomen - soft, NT, ND, NABS  Ext - no cyanosis, clubbing or edema  Skin - no rash  Psychology - alert and oriented, answers  appropriate        Electronically signed by:    Dmeetrius Mckenzie MD  [facility] Pulmonary and Critical Care Medicine

## 2021-05-27 NOTE — PATIENT INSTRUCTIONS - HE
Resume itraconazole (will provide 1 refill)    Repeat labs 1 month after starting itraconazole with folllow-up in clinic

## 2021-05-27 NOTE — TELEPHONE ENCOUNTER
Spoke w/ patient and daughter in clinic today as they were in to see Dr Mata for an office visit.  Per Kelly, after her many calls to the insurance company and a few other places she has narrowed things down to [facility].  [facility] stated that per their guidelines, motorized wheelchairs must be started with the DME company that will supply and that the DME company will work with patient to accomplish assessment either in house or by 3rd party if needed.  Kelly mentioned that she's been working with someone at [facility] already (she believes her name is Gianna) and that Gianna has started the process to get orders and needed information from us already.  I provided Kelly with a copy of the home health order dated 3/19 to see if that will meet Gianna's needs so process can start sooner, however, noted with Kelly that the order she has may not meet the guidelines and more info may still be needed.  Kelly took my card should further information be needed or questions arise.  Also told Kelly that it's completely appropriate for her to provide my direct contact info to Gianna at [facility] if that would be helpful in any way.  At this time, processes on my end are at a hold until I hear from Kelly or Gianna that more information is needed as Gianna at [facility] has taken the reigns on assessment and DME product.

## 2021-05-27 NOTE — PROGRESS NOTES
Assessment and Plan:       1. Routine general medical examination at a health care facility  Routine labs and will call with the results  - HM1(CBC and Differential)  - HM1 (CBC with Diff)    2. Chronic obstructive pulmonary disease, unspecified COPD type (H)  Seeing pulmonology - continue care as per them    3. Hyperparathyroidism (H)  Routine labs today and will monitor calcium    4. Allergic bronchopulmonary aspergillosis (H)  Stable - on antifungal as per pulmonology    5. Osteoporosis, unspecified osteoporosis type, unspecified pathological fracture presence  Stable - pt declined DEXA     6. Traumatic brain injury, without loss of consciousness, subsequent encounter  Stable - no changes.    7. Ventral hernia without obstruction or gangrene  Stable - monitoring bowel movements and pain    8. Chronic low back pain without sciatica, unspecified back pain laterality  Stable -currently working towards [employer] to help with mobility and ADLs.  Assessment by [facility]    9. Memory Lapses Or Loss  Stable - will continue to monitor    10. Urge Incontinence Of Urine  Stable - doing pelvic floor exercises and behavior modifications        The patient's current medical problems were reviewed.    I have had an Advance Directives discussion with the patient.  The following health maintenance schedule was reviewed with the patient and provided in printed form in the after visit summary:   Health Maintenance   Topic Date Due     ASTHMA FOLLOW-UP  1935     ZOSTER VACCINES (2 of 3) 04/17/2008     DXA SCAN  05/03/2019     ASTHMA CONTROL TEST  11/02/2019     FALL RISK ASSESSMENT  03/26/2020     ADVANCE DIRECTIVES DISCUSSED WITH PATIENT  03/20/2023     TD 18+ HE  03/20/2028     PNEUMOCOCCAL POLYSACCHARIDE VACCINE AGE 65 AND OVER  Completed     INFLUENZA VACCINE RULE BASED  Completed     PNEUMOCOCCAL CONJUGATE VACCINE FOR ADULTS (PCV13 OR PREVNAR)  Completed        Subjective:   Chief Complaint: Veronika Hernandez  is an 83 y.o. female here for an Annual Wellness visit.   HPI:  Planning on referral for motorized wheelchair through [facility].  Will go through [facility] for intake and consultation of the motorized wheelchair.  Pt unable to push wheelchair due to breathing issues, lack of strength due to chronic back issues.  Unable to walk without assistance, unable to keep balance, hx of falls, hx of traumatic brain injury.    Review of Systems:    Please see above.  The rest of the review of systems are negative for all systems.    Patient Care Team:  Yuli Mata MD as PCP - General (Family Medicine)     Patient Active Problem List   Diagnosis     Lower Back Pain     Joint Pain, Localized In The Hip     Limb Pain     Anisocoria     Chronic obstructive pulmonary disease, unspecified COPD type (H)     Spinal Stenosis     Memory Lapses Or Loss     HTN (hypertension)     Hyperparathyroidism (H)     Decrease In Height     Asthma     Osteoporosis     Urge Incontinence Of Urine     Allergic bronchopulmonary aspergillosis (H)     Abnormal CT scan, chest     Ventral hernia     Traumatic brain injury (H)     Past Medical History:   Diagnosis Date     Asthma      Asthma exacerbation 6/13/2017      Past Surgical History:   Procedure Laterality Date     NH HEMORRHOIDECTOMY INTERNAL RUBBER BAND LIGATIONS      Description: Hemorrhoidectomy;  Proc Date: 07/01/2008;     [facility] LAP, VENTRAL HERNIA REPAIR,REDUCIBLE      Description: Laparoscopy Repair Of Umbilical Hernia;  Recorded: 06/10/2010;     NH LAP,CHOLECYSTECTOMY      Description: Cholecystectomy Laparoscopic;  Recorded: 06/10/2010;     NH PERC VERTEB AUGMENT/ KYPHOPLAST, THOR      Description: Percutaneous Vertebral Augmentation Thoracic;  Proc Date: 04/01/2009;      No family history on file.   Social History     Socioeconomic History     Marital status:      Spouse name: Not on file     Number of children: Not on file     Years of education: Not on file      Highest education level: Not on file   Occupational History     Not on file   Social Needs     Financial resource strain: Not on file     Food insecurity:     Worry: Not on file     Inability: Not on file     Transportation needs:     Medical: Not on file     Non-medical: Not on file   Tobacco Use     Smoking status: Never Smoker     Smokeless tobacco: Never Used   Substance and Sexual Activity     Alcohol use: Yes     Comment: 1 drink a day     Drug use: No     Sexual activity: Not on file   Lifestyle     Physical activity:     Days per week: Not on file     Minutes per session: Not on file     Stress: Not on file   Relationships     Social connections:     Talks on phone: Not on file     Gets together: Not on file     Attends Orthodoxy service: Not on file     Active member of club or organization: Not on file     Attends meetings of clubs or organizations: Not on file     Relationship status: Not on file     Intimate partner violence:     Fear of current or ex partner: Not on file     Emotionally abused: Not on file     Physically abused: Not on file     Forced sexual activity: Not on file   Other Topics Concern     Not on file   Social History Narrative     Not on file      Current Outpatient Medications   Medication Sig Dispense Refill     acetaminophen (TYLENOL) 325 MG tablet Take 2 tablets (650 mg total) by mouth every 6 (six) hours. 100 tablet 0     albuterol (PROAIR HFA) 90 mcg/actuation inhaler Inhale 2 puffs every 4 (four) hours as needed for wheezing. 4 Inhaler 0     albuterol (PROVENTIL) 2.5 mg /3 mL (0.083 %) nebulizer solution INHALE ONE VIAL VIA NEBULIZER EVERY FOUR HOURS AS NEEDED 225 mL 2     amLODIPine (NORVASC) 5 MG tablet Take 1 tablet (5 mg total) by mouth daily. 90 tablet 4     ASMANEX TWISTHALER 220 mcg (30 doses) AePB inhaler INHALE 1 PUFF DAILY. 1 each 12     calcium carbonate-vitamin D3 (CALCIUM 600 + D,3,) 600 mg(1,500mg) -400 unit per tablet Take 2 tablets by mouth daily with supper.        itraconazole (SPORANOX) 100 mg capsule Take 100 mg by mouth daily.       predniSONE (DELTASONE) 10 mg tablet Take 2 tabs (20 mg ) po q day for 7 days, then take 1 tab(10 mg) po q day for 7 days then stop. 21 tablet 3     cholecalciferol, vitamin D3, 2,000 unit cap Take 1 capsule by mouth daily.       mometasone (ASMANEX TWISTHALER) 220 mcg (30 doses) AePB inhaler Inhale 1 puff daily. 3 each 3     No current facility-administered medications for this visit.       Objective:   Vital Signs:   Visit Vitals  /76 (Patient Site: Left Arm, Patient Position: Sitting, Cuff Size: Adult Regular)   Pulse 72        VisionScreening:  No exam data present     PHYSICAL EXAM    Physical Exam:  General Appearance: Alert, cooperative, no distress, appears stated age   Head: Normocephalic, without obvious abnormality, atraumatic  Eyes: PERRL, conjunctiva/corneas clear, EOM's intact   Ears: Normal TM's and external ear canals, both ears  Nose:Nares normal, septum midline,mucosa normal, no drainage    Throat:Lips, mucosa, and tongue normal; teeth and gums normal  Neck: Supple, symmetrical, trachea midline, no adenopathy;  thyroid: not enlarged, symmetric, no tenderness/mass/nodules  Back: Symmetric, no curvature, ROM normal,  Lungs: Clear to auscultation bilaterally, respirations unlabored  Heart: Regular rate and rhythm, S1 and S2 normal, no murmur, rub, or gallop  Abdomen: Soft, non-tender, bowel sounds active all four quadrants,  no masses, no organomegaly  Extremities: Extremities normal, atraumatic, no cyanosis or edema  Skin: Skin color, texture, turgor normal, no rashes or lesions  Lymph nodes: Cervical, supraclavicular, and axillary nodes normal  Neurologic: Normal    Identified Health Risks:     She is at risk for falling and has been provided with information to reduce the risk of falling at home.  As of 12/14/2016  Bone Mass Measurements covered once every 2 years and  1) women determined to be estrogen deficient or  at risk for osteoporosis  2) individuals with vertebral abnormalities or primary hyperparathyroidism    Cardiovascular Disease Screening  covered once every 5 years for all asymptomatic beneficiaries    Colorectal Cancer Screening Normal risk patients, aged 50+ covered for:   screening FOBT every year  screening flex sig once every 4 years (or 10 years after colonoscopy)  screening colonoscopy once every 10 years (or 4 years after flex sig)  High risk patients, aged 50+:  screening FOBT every year  screening flex sig once every 4 years  screening colonoscopy once every 2 years (or 4 years after flex sig)   Diabetes Screening covered once per year for beneficiaries with certain risk factors for diabetes & not previously diagnosed with pre-diabetes   covered twice a year if diagnosed with pre-diabetes   Pneumococcal Vaccine/Admin all beneficiaries   Prostate Cancer Screening covered annually for all male patients aged 50+    Screening Mammography covered annually for patients aged 40+   Screening PapTest/Pelvic Exam covered annually if high risk, once every 2 years if normal risk    Screening Ultrasound for   Abnormal Aortic Aneurysm (AAA) covered once a lifetime for patients with certain risk factors for AAA   Hep B Screening covered for patients at intermediate or high risk for lucita Hep B     Other Preventive Services Medicare Covers    Alcohol Misuse Screening Counseling    Counseling to Prevent Tobacco Use (see Charge section)    Depression Screening    Diabetes Self-Management Training (DSMT)    Glaucoma Screening    Hepatitis C Virus (HCV) Screening    Human Immunodeficiency Virus (HIV) Screening    Influenza, Pneumococcal and Hep B vaccines    Intensive Behavioral Therapy (IBT) for Cardiovascular Disease    IBT for Obesity    Medical Nutrition Therapy (MNT)    Screening for Sexually Transmitted Infections, Screenin    Assessment Results 3/26/2019   Activities of Daily Living 1 - Full function    Instrumental Activities of Daily Living 5-6 - Severe functional impairment   Mini Cog Total Score 3   Some recent data might be hidden     A Mini-Cog score of 0-2 suggests the possibility of dementia, score of 3-5 suggests no dementia    Identified Health Risks:       The patient was counseled and encouraged to consider modifying their diet and eating habits. She was provided with information on recommended healthy diet options.  The patient reports that she has difficulty with activities of daily living. I have asked that the patient make a follow up appointment in 8 weeks where this issue will be further evaluated and addressed.  The patient reports that she has difficulty with activities of daily living.  This issue was addressed at today's appointment.  8  The patient reports that she has difficulty with instrumental activities of daily living.  She was provided with a list of local organizations that provide support services and advised to make a follow up appointment in 8 weeks to address this further.   Patient's advanced directive was discussed and I am comfortable with the patient's wishes.

## 2021-05-27 NOTE — TELEPHONE ENCOUNTER
"Spoke with patient's daughter Kelly.  Unfortunately the labs that Dr. Gray wanted to have drawn one month after starting the itraconazole were not drawn when patient had her visit with her PCP.    Patient is \"home-bound\" and daughter states it is just too difficult to get her out to have these drawn prior to her appt with Dr. Gray.  She will have the labs drawn at [facility] about 1 hour prior to her scheduled appt on 4/16 so that the results should be available for her office visit.  She is not able to get her out to have these labs drawn prior to that date.    "

## 2021-05-28 ENCOUNTER — RECORDS - HEALTHEAST (OUTPATIENT)
Dept: ADMINISTRATIVE | Facility: CLINIC | Age: 86
End: 2021-05-28

## 2021-05-28 NOTE — TELEPHONE ENCOUNTER
RN cannot approve Refill Request    RN can NOT refill this medication med is not covered by policy/route to provider     . Last office visit: 2/27/2019 Yuli Mata MD Last Physical: 3/26/2019 Last MTM visit: Visit date not found Last visit same specialty: 2/27/2019 Yuli Mata MD.  Next visit within 3 mo: Visit date not found  Next physical within 3 mo: Visit date not found      Mica Taveras, [facility] Triage/Med Refill 5/9/2019    Requested Prescriptions   Pending Prescriptions Disp Refills     ASMANEX TWISTHALER 220 mcg (30 doses) AePB inhaler [Pharmacy Med Name: ASMANEX TWISTHALER 220 MCG #30] 3 each 3     Sig: INHALE 1 PUFF DAILY.       There is no refill protocol information for this order

## 2021-05-29 ENCOUNTER — RECORDS - HEALTHEAST (OUTPATIENT)
Dept: ADMINISTRATIVE | Facility: CLINIC | Age: 86
End: 2021-05-29

## 2021-05-29 NOTE — PROGRESS NOTES
"TCM DISCHARGE FOLLOW UP CALL    Discharge Date:  6/6/2019  Reason for hospital stay (discharge diagnosis)::  Severe persistent asthma with exacerbation, ABPA (allergic bronchopulmonary aspergillosis), Chronic COPD, Essential hypertension  Are you feeling better, the same or worse since your discharge?:  Patient is feeling better (Breathing \"excellent;\" hasn't needed O2 since Monday; wheezing gone, cough improved, no sputum. SaO2 high 90's. She's \"extremely\" weak, tending to her at bedside, getting her up during the day. Nauseated, loose stools; nausea better last night.)  Do you feel like you have a plan in the event of a health emergency?: Yes (Call 911)    As part of your discharge plan, were  home care services ordered for you?: No    Did you receive any new medications, or was there a change to your medications?: Yes    Are you taking those medications, or do you have any established regiment?:  RN reviewed discharge medications w/pt's daughter, Kelly.  She reports mom is taking doxycycline 100 mg twice a day, sodium chloride neb solution twice a day, albuterol nebs four times a day as instructed (will continue until sees pulmonologist), and prednisone 30 mg daily (know to continue this dose until she sees pulmonologist).  RN advised daughter instruct pt to take doxycycline with a full glass of water, as ATBx can cause GI upset if not taken w/plenty of fluids.  Daughter verbalized understanding & will let her parents know.  Do you have any follow up visits scheduled with your PCP or Specialist?:  Yes, with Specialist  Who are you seeing and when is it scheduled?:  Pulmonologist 6/14/19  I'm glad to hear you're doing well and we want you to continue to do well. Your PCP would like to see you for a follow-up visit. Can we help set that up for your today?: No    (RN) Provided patient the PCP's phone number to call if they have any questions or concerns?: Yes    RN NOTES::  Daughter states \"well over the hump of the " "fungal infection.\"  Eating a low sodium meal, and having yogurt daily.  Sates prednisone causing anxiousness/excitability, insomnia, aware common side effects of medication.  Pulmonology appt rescheduled from 6/11/19 to 6/14/19; too sick to make it to appt yesterday d/t nausea, belching, loose stools, and she was up most of the night the night before her appt.  Daughter will call clinic to schedule f/u w/PCP after appt w/pulmonology; waiting until she feels well enough to make it to her appts.        Cydney Vivas RN Care Manager, Population Health    "

## 2021-05-29 NOTE — PATIENT INSTRUCTIONS - HE
Stop itraconazole, switch to:    Voriconazole: 400 mg twice per day for 1 day, then 200 mg po two times a day thereafter   - check voriconazole level & liver/renal tests 5 days after starting   - check Immunoglobulin E & liver/renal tests 1 month later    Taper off prednisone - 5 days of 20 mg, then 5 days of 10 mg, then 4 days 5 mg, then stop    Return to clinic in 1-2 months to follow-up lab tests

## 2021-05-29 NOTE — PROGRESS NOTES
Assessment:  Veronika Hernandez is an 83 y.o. 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  She presented to our clinic Nov 2018 to transfer her pulmonary care here after her previous [facility] pulmonologist has retired.  Over the past 10 years since she was diagnosed with ABPA, she had on average about 1 hospitalization and 3-4 outpatient prednisone burst treatments per year for ABPA flares.       Her ABPA diagnosis was confirmed with a repeat total IgE level greater than 1000, and an elevated Aspergillus specific IgE.  Allergy clinic could not perform skin prick testing due to the patient's thin skin. Baseline CK, liver & kidney function tests were normal.  After I discussed the pros and cons of trying antifungal therapy to decrease the number of flareups of ABPA extensively with the patient and her family, they decided to attempt antifungal therapy to decrease the frequency of flares. Itraconazole was therefore started in early February 2019 and has been continued since, albeit with a 3 week break late March/early April due to inability to get follow-up testing. She has tolerated it well without evidence of liver, renal injury or other side effects, however her IgE continue to rise and she has continued to have 2 further asthma/ABPA exacerbations while on it, the most recent requiring hospitalization from 6/4-6/6/2019     Plan:  1) ABPA: recent flare, still on a prednisone taper. discussed in depth with patient and her daughter. As she is not improving with itraconazole therapy we will discontinue it at this time. They do however wish to attempt voriconazole therapy, which may have better efficacy due to the potential for better absorption   -d/c itraconazole, start voriconazole: 400 mg twice per day for 1 day, then 200 mg po two times a day thereafter (they will plan to start taking 5 days before she can have lab  tests drawn.  The patient and family have proven to be very compliant with lab tests, and understand that she should not continue to take the medication if she cannot have the appropriate lab tests checked in the recommended timeframe)     - check voriconazole level & liver/renal tests 5 days after starting     - check Immunoglobulin E & liver/renal tests 1 month later   -Taper off prednisone - 5 days of 20 mg, then 5 days of 10 mg, then 4 days 5 mg, then stop      2) Asthma/COPD overlap   - Continue Asmanex and albuterol inhaler as needed     Follow-up in our clinic in 1-2 months to follow-up lab tests      CCx: ABPA    HPI: Her breathing has been feeling okay since she was discharged hospital earlier this month for an ABPA flare.  She has been continuing to take the itraconazole as prescribed.  She denies any cough, fevers chills, chest pain, abdominal pain, or other complaints.    ROS:  A review of 12 organ systems was performed with pertinent positives and negatives noted in the HPI.      Current Meds:  Current Outpatient Medications   Medication Sig     acetaminophen (TYLENOL) 325 MG tablet Take 2 tablets (650 mg total) by mouth every 6 (six) hours.     albuterol (PROVENTIL) 2.5 mg /3 mL (0.083 %) nebulizer solution INHALE ONE VIAL VIA NEBULIZER EVERY FOUR HOURS AS NEEDED     albuterol (PROVENTIL) 2.5 mg /3 mL (0.083 %) nebulizer solution Take 3 mL (2.5 mg total) by nebulization 4 (four) times a day. Continue until f/u with Pulmonologist on 6/11/19     amLODIPine (NORVASC) 5 MG tablet Take 1 tablet (5 mg total) by mouth daily.     ASMANEX TWISTHALER 220 mcg (30 doses) AePB inhaler INHALE 1 PUFF DAILY.     calcium carbonate-vitamin D3 (CALCIUM 600 + D,3,) 600 mg(1,500mg) -400 unit per tablet Take 1 tablet by mouth 2 (two) times a day with meals.            polyethylene glycol (MIRALAX) 17 gram packet Take 1 packet (17 g total) by mouth daily as needed (for constipation).     predniSONE (DELTASONE) 10 mg tablet  Take 2 tabs daily for 5 days, then 1 tabs daily for 5 days, then 1/2 tabs daily for 4 days, then stop..     voriconazole (VFEND) 200 MG tablet Take 1 tablet (200 mg total) by mouth 2 (two) times a day.       Labs:  No results found for this or any previous visit (from the past 72 hour(s)).    I have personally reviewed all pertinent imaging studies and PFT results unless otherwise noted.    Imaging studies:  Xr Chest 2 Views    Result Date: 6/4/2019  EXAM: XR CHEST 2 VIEWS LOCATION: [facility] DATE/TIME: 6/4/2019 11:41 AM INDICATION: Chest Pain COMPARISON: 9/30/2019 FINDINGS: No change. Heart size upper limits of normal. Pulmonary vessels normal. Lungs appear clear. Diffuse osteopenia previous lumbar surgery and vertebroplasty procedures. Scoliosis. Degenerative changes.        Physical Exam:  /60   Pulse 75   Ht 5' (1.524 m)   Wt 102 lb (46.3 kg)   SpO2 92%   Breastfeeding? No   BMI 19.92 kg/m    General - Well nourished  Ears/Mouth -  OP pink moist, no thrush  Neck - no cervical lymphadenopathy  Lungs - Clear to ausculation bilaterally  CVS - regular rhythm with no murmurs, rubs or gallups  Abdomen - soft, NT, ND, NABS  Ext - no cyanosis, clubbing or edema  Skin - no rash  Psychology - alert and oriented, answers appropriate        Electronically signed by:    Demetrius Mckenzie MD  [facility]

## 2021-05-29 NOTE — TELEPHONE ENCOUNTER
Call from Veronika's daughter, Kelly.  Asking for results of her mom's labs that were drawn on 5/21.  Asking for phone call rather than returning for office visit as it has become increasingly hard to get her mom out of the house.      Veronika is still taking the itraconazole prescribed.

## 2021-05-29 NOTE — TELEPHONE ENCOUNTER
Orders being requested: Skilled Nursing and Portable Oxygen  Reason service is needed/diagnosis: Patient was recently discharged from the hospital on 05/31/2019. Patient's daughter states she will reach out to the pulmonologist to see if he is able to process orders for oxygen without an office visit. But is requesting patient's PCP move forward with orders for Skilled Nursing. I did inform patient's daughter, patient may need to be seen in clinic for a hospital follow up. Patient's daughter stated patient is very weak at this time.  When are orders needed by: As Able  Where to send Orders: Patient's daughter is requesting order be submitted to [facility]  Lio to leave detailed message?  Yes

## 2021-05-29 NOTE — TELEPHONE ENCOUNTER
Left message for pt call back. We have openings on Thursday June 13th. Please help schedule hospital F/U for 30minutes.

## 2021-05-29 NOTE — TELEPHONE ENCOUNTER
Refill Approved    Rx renewed per Medication Renewal Policy. Medication was last renewed on 3/8/2018 for 90/4.  Last OV 3/26/19 PE    Ella Mcgowan, [facility] Triage/Med Refill 5/28/2019     Requested Prescriptions   Pending Prescriptions Disp Refills     amLODIPine (NORVASC) 5 MG tablet [Pharmacy Med Name: AMLODIPINE BESYLATE 5 MG TAB] 90 tablet 3     Sig: TAKE 1 TABLET (5 MG TOTAL) BY MOUTH DAILY.       Calcium-Channel Blockers Protocol Passed - 5/28/2019  1:23 AM        Passed - PCP or prescribing provider visit in past 12 months or next 3 months     Last office visit with prescriber/PCP: 2/2/2017 Solange Marroquin MD OR same dept: Visit date not found OR same specialty: 2/27/2019 Yuli Mata MD  Last physical: 9/28/2015 Last MTM visit: Visit date not found   Next visit within 3 mo: Visit date not found  Next physical within 3 mo: Visit date not found  Prescriber OR PCP: Solange Marroquin MD  Last diagnosis associated with med order: 1. Essential hypertension  - amLODIPine (NORVASC) 5 MG tablet [Pharmacy Med Name: AMLODIPINE BESYLATE 5 MG TAB]; Take 1 tablet (5 mg total) by mouth daily.  Dispense: 90 tablet; Refill: 3    If protocol passes may refill for 12 months if within 3 months of last provider visit (or a total of 15 months).             Passed - Blood pressure filed in past 12 months     BP Readings from Last 1 Encounters:   04/16/19 132/80

## 2021-05-29 NOTE — TELEPHONE ENCOUNTER
----- Message from Yuli Mata MD sent at 6/11/2019  5:03 PM CDT -----  Please help pt schedule appointment - I know multiple tries already

## 2021-05-29 NOTE — TELEPHONE ENCOUNTER
Call from Veronika's daughter, Kelly.  States Veronika's breathing has been more labored over the weekend.  Increased wheezing.  sats are in the 90% range with rest, but down to 86-88% with activities.  She is quite labored with any activity. They are using wheelchair to take her to the bathroom.  Currently on Prednisone 20mg daily x 7 days, then to decrease to 10mg daily x 7 days.  Daughter states they tried to give her 40mg in the hospital and she had increased confusion and did not feel well on that dose.  Daughter asking about getting oxgygen for her mom.  They have appt with Dr. Mckenzie on June 11.  They will try to get by until then. Patient will need new face to face and walk testing to qualify for oxygen. She continues on the itraconazole as ordered as well.

## 2021-05-29 NOTE — TELEPHONE ENCOUNTER
Patient Returning Call  Reason for call:  Kelly, patient's daughter  Information relayed to patient: Message from Yuli Mata MD sent at 6/11/2019  5:03 PM CDT -----  Please help pt schedule appointment - I know multiple tries already  Patient has additional questions:  No  If YES, what are your questions/concerns:  Veronika is on the mend but having side effects to medications.  She is very weak at this time and needs to be seen by the lung specialist before seeing Dr. Mata.  Kelly will call to schedule an appointment when she is sure Veronika can make it in.  Okay to leave a detailed message?: No call back needed

## 2021-05-29 NOTE — TELEPHONE ENCOUNTER
New Appointment Needed  What is the reason for the visit:  INF - [facility] - 6/4 thru 6/6 - Asthma - f/u within a week  Inpatient/ED Follow Up Appt Request  At what hospital or facility were you seen?: [facility]  What is the reason you were seen?: Asthma  What date were you admitted?: date: 6/4  What date were you discharged?: date: 6/6  What was the recommended timeframe for your follow up appointment?: within a week  Provider Preference: PCP only  How soon do you need to be seen?: within a week  Waitlist offered?: No  Okay to leave a detailed message:  Yes    Please call the patient back and assist in scheduling this inpatient follow up appointment, thank you

## 2021-05-29 NOTE — PROGRESS NOTES
Hospital discharge follow up call to pt, no answer.  Left VM message reminding patient to call clinic to schedule INF.  RN will attempt call back at another time.    Cydney Vivas RN Care Manager, Population Health

## 2021-05-30 ENCOUNTER — RECORDS - HEALTHEAST (OUTPATIENT)
Dept: ADMINISTRATIVE | Facility: CLINIC | Age: 86
End: 2021-05-30

## 2021-05-30 VITALS — BODY MASS INDEX: 25.64 KG/M2 | HEIGHT: 56 IN | WEIGHT: 114 LBS

## 2021-05-30 VITALS — WEIGHT: 110 LBS | BODY MASS INDEX: 24.75 KG/M2 | HEIGHT: 56 IN

## 2021-05-30 VITALS — HEIGHT: 56 IN | BODY MASS INDEX: 25.64 KG/M2 | WEIGHT: 114 LBS

## 2021-05-30 NOTE — TELEPHONE ENCOUNTER
Prior Authorization Request  Who s requesting:  Pharmacy  [facility], [place], [place]  Medication Name: voriconazole 200mg tab  Insurance Plan: [employer] medic[employer] advantage  Insurance Member ID Number:  [id number]  Informed patient that prior authorizations can take up to 10 business days for response:   No  Okay to leave a detailed message: No    Patient has been on Itraconazole treatment with continued rise in IgE levels and 2 further exacerbations.

## 2021-05-30 NOTE — TELEPHONE ENCOUNTER
Central PA team  [phone]  Pool: HE PA MED ([id number])          PA has been initiated.       PA form completed and faxed insurance via Cover My Meds     Key:  [id number] - PA Case ID: [id number]     Medication:    Voriconazole 200MG tablets    Insurance:  [employer]        Response will be received via fax and may take up to 5-10 business days depending on plan

## 2021-05-30 NOTE — TELEPHONE ENCOUNTER
I called this patient's daughter to check into why the Voriconazole follow-up labs had not been drawn. She said they had been told by the pharmacy that the order was never received. However, per EMR the pharmacy confirmed the order on 6/14. Message sent to clinic staff to verify that the pharmacy gets the prescription and resend if necessary. Patient's daughter said she will check again with pharmacy tomorrow afternoon.

## 2021-05-31 VITALS — BODY MASS INDEX: 24.44 KG/M2 | WEIGHT: 110 LBS

## 2021-05-31 NOTE — TELEPHONE ENCOUNTER
Orders being requested: motorized wheel chair  Reason service is needed/diagnosis: spinal stenosis, joint pain, low back pain, TBI, COPD, osteoporosis  When are orders needed by: asap  Where to send Orders: [facility]  Okay to leave detailed message?  Yes    Daughter states [facility] faxed over forms several times since June.   Please call with status.

## 2021-05-31 NOTE — TELEPHONE ENCOUNTER
Call from patient's daughter, Nora.  Wanted to let Dr. Mckenzie know that Veronika is going to be starting her antifungal today.  They waited to start as she has an appt next week at eye clinic and will be able to get the required labs drawn at that time. (5 days after starting the medication)

## 2021-05-31 NOTE — TELEPHONE ENCOUNTER
Name of form/paperwork: Other:  DME Medical Review Form / [facility] for Wheelchair   Have you been seen for this request: N/A  Do we have the form: Rep will fax today , 2nd Request   When is form needed by: ASAP   How would you like the form returned: Fax   Fax Number: [phone]  Patient Notified form requests are processed in 3-5 business days: No  (If patient needs form sooner, please note that in this message.)   This form was faxed for PCP to fill out several days ago. Rep will fax again , Please advise   Okay to leave a detailed message? Yes

## 2021-05-31 NOTE — TELEPHONE ENCOUNTER
We have filled them all - they keep sending the same ones.  Please notify daughter with your update from when you called them.

## 2021-05-31 NOTE — TELEPHONE ENCOUNTER
Refill Approved    Rx renewed per Medication Renewal Policy. Medication was last renewed on 2/12/19.    Mica Taveras, [facility] Triage/Med Refill 8/30/2019     Requested Prescriptions   Pending Prescriptions Disp Refills     albuterol (PROVENTIL) 2.5 mg /3 mL (0.083 %) nebulizer solution [Pharmacy Med Name: ALBUTEROL SUL 2.5 MG/3 ML SOLN] 225 mL 2     Sig: INHALE ONE VIAL VIA NEBULIZER EVERY FOUR HOURS AS NEEDED       Albuterol/Levalbuterol Refill Protocol Passed - 8/29/2019 10:36 AM        Passed - PCP or prescribing provider visit in last year     Last office visit with prescriber/PCP: 2/27/2019 Yuli Mata MD OR same dept: 2/27/2019 Yuli Mata MD OR same specialty: 2/27/2019 Yuli Mata MD Last physical: 3/26/2019       Next appt within 3 mo: Visit date not found  Next physical within 3 mo: Visit date not found  Prescriber OR PCP: Yuli Mata MD  Last diagnosis associated with med order: 1. Asthma, chronic, moderate persistent, uncomplicated  - albuterol (PROVENTIL) 2.5 mg /3 mL (0.083 %) nebulizer solution [Pharmacy Med Name: ALBUTEROL SUL 2.5 MG/3 ML SOLN]; INHALE ONE VIAL VIA NEBULIZER EVERY FOUR HOURS AS NEEDED  Dispense: 225 mL; Refill: 2    If protocol passes may refill for 6 months if within 3 months of last provider visit (or a total of 9 months). If patient requesting >1 inhaler per month refill x 6 months and have patient make appointment with provider.

## 2021-05-31 NOTE — TELEPHONE ENCOUNTER
I called the patient at home and spoke with her and her family. They stated that though she has received the voriconazole (for ABPA) from the pharmacy, she has not started it yet as she is planning to get baseline labs prior, which they have scheduled to do within the next couple of weeks however. Her breathing is currently feeling well. I reminded them to also have blood tests repeated 1 month after starting voriconazole, to which they verbalized their understanding.

## 2021-06-01 ENCOUNTER — RECORDS - HEALTHEAST (OUTPATIENT)
Dept: ADMINISTRATIVE | Facility: CLINIC | Age: 86
End: 2021-06-01

## 2021-06-01 VITALS — BODY MASS INDEX: 18.06 KG/M2 | HEIGHT: 60 IN | WEIGHT: 92 LBS

## 2021-06-01 NOTE — TELEPHONE ENCOUNTER
Call from patient's daughter, Kelly.  States her mom stopped her antifungal - voriconazole - due to side effects.  Kelly states she was having dizziness that was affecting her mobility.  Because they stopped the voriconazole, she cancelled her appt with Dr. Dickinson for tomorrow.    Returned call and was not able to reach Kelly.  Left detailed VM message asking her to please reschedule the appt so we can discuss alternatives.

## 2021-06-02 ENCOUNTER — RECORDS - HEALTHEAST (OUTPATIENT)
Dept: ADMINISTRATIVE | Facility: CLINIC | Age: 86
End: 2021-06-02

## 2021-06-02 VITALS — WEIGHT: 95 LBS | HEIGHT: 60 IN | BODY MASS INDEX: 18.65 KG/M2

## 2021-06-02 VITALS — HEIGHT: 60 IN | BODY MASS INDEX: 17.67 KG/M2 | WEIGHT: 90 LBS

## 2021-06-02 VITALS — BODY MASS INDEX: 18.55 KG/M2 | HEIGHT: 60 IN

## 2021-06-03 VITALS — WEIGHT: 102 LBS | BODY MASS INDEX: 20.03 KG/M2 | HEIGHT: 60 IN

## 2021-06-05 ENCOUNTER — RECORDS - HEALTHEAST (OUTPATIENT)
Dept: ENDOCRINOLOGY | Facility: CLINIC | Age: 86
End: 2021-06-05

## 2021-06-05 DIAGNOSIS — M81.0 OSTEOPOROSIS: ICD-10-CM

## 2021-06-05 DIAGNOSIS — E21.3 HYPERPARATHYROIDISM (H): ICD-10-CM

## 2021-06-05 NOTE — TELEPHONE ENCOUNTER
Refill Approved    Rx renewed per Medication Renewal Policy. Medication was last renewed on 8/30/19.    Mica Taveras, [facility] Triage/Med Refill 1/29/2020     Requested Prescriptions   Pending Prescriptions Disp Refills     albuterol (PROVENTIL) 2.5 mg /3 mL (0.083 %) nebulizer solution [Pharmacy Med Name: ALBUTEROL SUL 2.5 MG/3 ML SOLN] 225 mL 2     Sig: INHALE ONE VIAL VIA NEBULIZER EVERY FOUR HOURS AS NEEDED       Albuterol/Levalbuterol Refill Protocol Passed - 1/29/2020  2:30 PM        Passed - PCP or prescribing provider visit in last year     Last office visit with prescriber/PCP: 2/27/2019 Yuli Mata MD OR same dept: 2/27/2019 Yuli Mata MD OR same specialty: 2/27/2019 Yuli Mata MD Last physical: 3/26/2019       Next appt within 3 mo: Visit date not found  Next physical within 3 mo: Visit date not found  Prescriber OR PCP: Yuli Mata MD  Last diagnosis associated with med order: 1. Asthma, chronic, moderate persistent, uncomplicated  - albuterol (PROVENTIL) 2.5 mg /3 mL (0.083 %) nebulizer solution [Pharmacy Med Name: ALBUTEROL SUL 2.5 MG/3 ML SOLN]; INHALE ONE VIAL VIA NEBULIZER EVERY FOUR HOURS AS NEEDED  Dispense: 225 mL; Refill: 2    If protocol passes may refill for 6 months if within 3 months of last provider visit (or a total of 9 months). If patient requesting >1 inhaler per month refill x 6 months and have patient make appointment with provider.

## 2021-06-08 NOTE — PROGRESS NOTES
"S:  80 yo female who comes in today in follow up of her right neck swelling.  This happened several days ago, it was sudden in onset.  It was non tender.  No history of similar.  No lip swelling, mouth swelling, or difficulty breathing.  She denies any problems with swallowing.  Her swelling has gone down a bit over the past day or so.  She was seen on that day in urgent care, and had a CT scan, which demonstrated some mild inflammation of the submandibular gland on the right.  No other abnormalities were noted.  I do not have any lab values from that day although her  does say they juvenal some blood.    She did not have any new foods or any exposures to new medications or products on the day that this occurred.  She does feel like the swelling is down a little bit today.    Soc hx:  They have moved into a townhouse.  It is a single story.  Their daughter does cooking for them.  Their son takes care of everything else. Their youngest son bought their house.  They are really liking their new living arrangement.  Their children are in charge of their finances.      fam hx:  Granddaughter was diagnosed with leukemia while she was pregnant, this was 2 years ago.  This is the granddaughter who lived with them for 20 years.  They took the baby at 26 weeks.  He is doing well.    O:    Visit Vitals     /62 (Patient Site: Right Arm, Patient Position: Sitting, Cuff Size: Adult Regular)     Pulse 76     Temp 97.9  F (36.6  C) (Oral)     Resp 16     Ht 4' 8.25\" (1.429 m)     Wt 114 lb (51.7 kg)     BMI 25.33 kg/m2     Gen: no acute distress  Neck:  Supple, no lad, no carotid bruits.  She is quite kyphotic, and it is difficult to tell if this pushes her neck forward.  She does have a slightly prominent submandibular gland on the right.  It is minimally tender.  No erythema noted.  Thyroid is symmetrically enlarged.  No nodules noted.  Full rom in neck.    Oropharynx is normal.  No masses noted.    Heart:  Regular rate " and rhythm.  No m/r/g  Lungs: cta bilaterally, no wheezes or rhonchi.  Good air inspiration  Abdomen:  No masses or organomegaly  Extremities:  No edema.   Tender along the right deltoid and right arm, as well as the entire lateral right leg.  Pt says this tenderness has been ongoing for many years.  No erythema or warmth noted.  No rashes noted.          Patient Active Problem List   Diagnosis     Lower Back Pain     Joint Pain, Localized In The Hip     Limb Pain     Anisocoria     Chronic Obstructive Pulmonary Disease     Spinal Stenosis     Memory Lapses Or Loss     Cerumen Impaction In Both Ears     Essential Hypertension     Hyperparathyroidism     Decrease In Height     Acute Bronchitis     Asthma     Osteoporosis     Urge Incontinence Of Urine     Current Outpatient Prescriptions on File Prior to Visit   Medication Sig Dispense Refill     albuterol (PROAIR HFA) 90 mcg/actuation inhaler Inhale 2 puffs every 4 (four) hours as needed for wheezing. 4 Inhaler 0     albuterol (PROVENTIL) 2.5 mg /3 mL (0.083 %) nebulizer solution INHALE ONE VIAL VIA NEBULIZER EVERY FOUR HOURS AS NEEDED 360 mL 1     amLODIPine (NORVASC) 5 MG tablet TAKE ONE TABLET BY MOUTH ONE TIME DAILY 90 tablet 2     amoxicillin (AMOXIL) 875 MG tablet Take 1 tablet (875 mg total) by mouth 2 (two) times a day. 20 tablet 1     ASMANEX TWISTHALER 220 mcg (30 doses) AePB inhaler INHALE 1 PUFF BY MOUTH ONCE DAILY IN THE EVENING. 1 each 1     aspirin 81 MG EC tablet Take 81 mg by mouth daily.       calcium carbonate-vitamin D3 (CALCIUM 600 + D,3,) 600 mg(1,500mg) -400 unit per tablet Take 2 tablets by mouth daily with supper.       cholecalciferol, vitamin D3, 2,000 unit cap Take 1 capsule by mouth daily.       doxazosin (CARDURA) 4 MG tablet Take 4 mg by mouth daily. AS DIRECTED       DOXYCYCLINE HYCLATE ORAL Take 100 mg by mouth every 12 (twelve) hours.       mometasone (ASMANEX TWISTHALER) 220 mcg (30 doses) AePB inhaler Inhale 1 puff every evening.        montelukast (SINGULAIR) 10 mg tablet TAKE ONE TABLET BY MOUTH ONE TIME DAILY  30 tablet 0     predniSONE (DELTASONE) 20 MG tablet TAKE TWO TABLETS BY MOUTH DAILY 10 tablet 0     Q- mg tablet TAKE 2 TABLETS BY MOUTH 4 TIMES DAILY AS NEEDED 720 tablet 3     No current facility-administered medications on file prior to visit.           No results found for this or any previous visit (from the past 48 hour(s)).      Assessment/Plan:  1. Localized swelling, mass and lump, neck  Suspect possible stone in the submandibular gland that passed.  Return if any pain or worsening swelling.    Rule out thyroid disease.    - Thyroid [place]  - US Neck Limited; Future  - US Thyroid; Future    2. Hypertension  Recheck in 1 month.  Consider home bp cuff, as she gets anxious when going out now.  She says it is quite an ordeal to leave the house.    - Comprehensive Metabolic Panel    3. Asthma, chronic, moderate persistent, uncomplicated  Continue with current meds.  Prednisone refilled for her once or twice yearly flares of her fungal infection .           Solange Marroquin   2/2/2017 11:31 AM

## 2021-06-09 NOTE — PROGRESS NOTES
Subjective:   Comes in for recheck of her blood pressure.  She is doing quite well.  She is on amlodipine 5 mg daily.  She has no side effects to that.  She has no symptoms of elevated blood pressure.  She denies headaches.  She has had no dizziness or lightheadedness.  She denies chest pains or palpitations of any kind.      Objective:  HEENT: Pupils equally round and reactive to light.  Fundi are benign.  Pharynx is clear.  Neck: No increased JVD noted.  No thyromegaly present.  No masses are palpable today.  No bruits heard.  Lungs: Lungs are clear bilaterally.  Patient's in no respiratory distress.  Cardiac: There is a regular rhythm present.  No murmur heard today.  Extremities: No edema noted in the legs      Assessment:  1.  Hypertension in good control today.      Plan:  The patient will continue present medications.  Follow-up here in 3 months for recheck of blood pressure.
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
## 2021-06-10 NOTE — PROGRESS NOTES
[facility]  ENDOCRINOLOGY    Osteoporosis Follow Up 5/14/2017    Veronika Hernandez, 1935, [id number]          Reason for visit      1. Osteoporosis    2. Hyperparathyroidism        History     Veronika Hernandez is a very pleasant 81 y.o. old female who presents for follow up.   SUMMARY:  1.Osteoporosis- accompanied by her . She has a history   of severe osteoporosis. Dr. Del Cid first evaluated Veronika in 08/2009. She had been on   alendronate for 10 years prior and was experiencing bone loss. She   transitioned to Forteo and completed a 2-year course in 09/2011. She has   received 4 doses of zoledronic acid-last one 2014. She has no history of kidney stones. She was on hormone   replacement therapy for approximately 10 years. She takes steroids   intermittently for lung disease but last course was nearly a year ago. She currently takes a calcium supplement   600/200 mg b.i.d She   eats yogurt daily. She uses a walker for ambulation and no recent falls. No new fractures since her last visit.   Her last DEXA was performed in 10/2012 showing a T-score of -3.4 at the   forearm and the bone mass at the hips had increased since the prior scan with   a T-score of -1.4 at the left and right total hips.     TODAY:  Veronika returns today in f/u for Osteoporosis.  She is a AEL from [facility].  She has been on a drug Holiday from [facility].  She has just had a Dexa Scan done, and looking at the scores, she has lost some ground since she stopped therapy.  She has had no fractures in this period of time.  She is taking a calcium/Vit D supplement daily, as well as a separate Vit D supplement.  She continues to eat Yoghurt daily.  She tries to get walking in daily.    Risk Factors     The following high- risk conditions have been ruled out: celiac disease, eating disorders, gastric bypass, hyperparathyroidism, inflammatory bowel disease, hyperthyroidism, rheumatoid arthritis, lupus, chronic kidney disease.        Past Medical  "History     Patient Active Problem List   Diagnosis     Lower Back Pain     Joint Pain, Localized In The Hip     Limb Pain     Anisocoria     Chronic Obstructive Pulmonary Disease     Spinal Stenosis     Memory Lapses Or Loss     Cerumen Impaction In Both Ears     Essential Hypertension     Hyperparathyroidism     Decrease In Height     Acute Bronchitis     Asthma     Osteoporosis     Urge Incontinence Of Urine       Family History       family history is not on file.    Social History      reports that she has never smoked. She has never used smokeless tobacco.      Review of Systems     Patient denies current pain, limited mobility, fractures.   Remainder per HPI.      Vital Signs     /70  Ht 4' 8.25\" (1.429 m)  Wt 110 lb (49.9 kg)  BMI 24.44 kg/m2    Physical Exam     GENERAL:  Normal, NIRD  EYES:  Pupils equal, round and reactive to light; no proptosis, lid lag or  periorbital edema.  THYROID:  Thyroid is normal.  No tenderness or bruit  NECK: No lymph nodes  MUSCULOSKELETAL: No joint abnormalities, FROM in all four extremities. No kyphosis. Muscle strength grossly normal without evidence of wasting.  HEART:  Regular rate and rhythm without murmur.  LUNGS:  Clear to auscultation.  ABDOMEN:Soft, non-tender, no masses or organomegaly  NEURO:  Patella Reflexes were normal.No tremors  SKIN:  No acanthosis nigricans or vitiligo        Assessment     1. Osteoporosis    2. Hyperparathyroidism        Plan       Given the loss of ground, so to speak, on her Bone Density, we will restart the Reclast infusions, because this is what she would like to do.  Orders were placed. Her current Calcium level is fine, albeit on the higher end of normal.  She needs a Vit D level to be done and an order was placed.  She didn't want to get it done today, so it will be done in the future.  Encouraged to continue doing her walking and to take the Calcium and Vit D supplements as she has been.  She will f/u in 1 year, with " another Dexa Scan, which will be appropriate after a change in therapy.    Total visit minutes:25  Time spent counseling and coordination of care:23    Jaquelin GROSSMAN Lorraine   Endocrinology  5/14/2017  9:50 AM        Current Medications     Outpatient Medications Prior to Visit   Medication Sig Dispense Refill     albuterol (PROAIR HFA) 90 mcg/actuation inhaler Inhale 2 puffs every 4 (four) hours as needed for wheezing. 4 Inhaler 0     albuterol (PROVENTIL) 2.5 mg /3 mL (0.083 %) nebulizer solution INHALE ONE VIAL VIA NEBULIZER EVERY FOUR HOURS AS NEEDED 360 mL 1     amLODIPine (NORVASC) 5 MG tablet TAKE ONE TABLET BY MOUTH ONE TIME DAILY 90 tablet 2     amoxicillin (AMOXIL) 875 MG tablet Take 1 tablet (875 mg total) by mouth 2 (two) times a day. 20 tablet 1     ASMANEX TWISTHALER 220 mcg (30 doses) AePB inhaler INHALE 1 PUFF BY MOUTH ONCE DAILY IN THE EVENING. 1 each 1     aspirin 81 MG EC tablet Take 81 mg by mouth daily.       calcium carbonate-vitamin D3 (CALCIUM 600 + D,3,) 600 mg(1,500mg) -400 unit per tablet Take 2 tablets by mouth daily with supper.       cholecalciferol, vitamin D3, 2,000 unit cap Take 1 capsule by mouth daily.       DOXYCYCLINE HYCLATE ORAL Take 100 mg by mouth every 12 (twelve) hours.       mometasone (ASMANEX TWISTHALER) 220 mcg (30 doses) AePB inhaler Inhale 1 puff every evening.       montelukast (SINGULAIR) 10 mg tablet TAKE ONE TABLET BY MOUTH ONE TIME DAILY  30 tablet 0     predniSONE (DELTASONE) 20 MG tablet TAKE TWO TABLETS BY MOUTH DAILY 10 tablet 0     Q- mg tablet TAKE 2 TABLETS BY MOUTH 4 TIMES DAILY AS NEEDED 720 tablet 3     No facility-administered medications prior to visit.          Lab Results     TSH   Date Value Ref Range Status   02/02/2017 2.27 0.30 - 5.00 uIU/mL Final     PTH   Date Value Ref Range Status   11/18/2015 29 10 - 86 pg/mL Final     Calcium   Date Value Ref Range Status   02/02/2017 10.0 8.5 - 10.5 mg/dL Final     Phosphorus   Date Value Ref Range  Status   11/18/2015 3.4 2.5 - 4.5 mg/dL Final           Imaging Results   Last DEXA scan:  Results for orders placed in visit on 10/20/14   DXA Bone Density Scan    Narrative SEE SCANNED DOCUMENT FOR RESULTS

## 2021-06-10 NOTE — TELEPHONE ENCOUNTER
RN cannot approve Refill Request    RN can NOT refill this medication Protocol failed and NO refill given. Last office visit: 2/27/2019 Yuli Mata MD Last Physical: 3/26/2019 Last MTM visit: Visit date not found Last visit same specialty: 2/27/2019 Yuli Mata MD.  Next visit within 3 mo: Visit date not found  Next physical within 3 mo: Visit date not found      Mica Taveras, [facility] Triage/Med Refill 7/28/2020    Requested Prescriptions   Pending Prescriptions Disp Refills     amLODIPine (NORVASC) 5 MG tablet [Pharmacy Med Name: AMLODIPINE BESYLATE 5 MG TAB] 90 tablet 3     Sig: TAKE 1 TABLET BY MOUTH EVERY DAY       Calcium-Channel Blockers Protocol Failed - 7/26/2020 11:11 AM        Failed - PCP or prescribing provider visit in past 12 months or next 3 months     Last office visit with prescriber/PCP: 2/27/2019 Yuli Mata MD OR same dept: Visit date not found OR same specialty: 2/27/2019 Yuli Mata MD  Last physical: 3/26/2019 Last MTM visit: Visit date not found   Next visit within 3 mo: Visit date not found  Next physical within 3 mo: Visit date not found  Prescriber OR PCP: Yuli Mata MD  Last diagnosis associated with med order: 1. Essential hypertension  - amLODIPine (NORVASC) 5 MG tablet [Pharmacy Med Name: AMLODIPINE BESYLATE 5 MG TAB]; TAKE 1 TABLET BY MOUTH EVERY DAY  Dispense: 90 tablet; Refill: 3    If protocol passes may refill for 12 months if within 3 months of last provider visit (or a total of 15 months).             Failed - Blood pressure filed in past 12 months     BP Readings from Last 1 Encounters:   06/14/19 102/60

## 2021-06-12 NOTE — PROGRESS NOTES
Assessment: /    Plan:    1. Hearing loss, bilateral     2. Bilateral impacted cerumen         Successful removal of cerumen impaction, bilateral.  Patient had marked improvement in her hearing.  Recheck if any problems.      Subjective:    HPI:  Veronika Hernandez is an 81-year-old female presenting with decreased hearing.  She has great difficulty hearing conversation, and has the television louder than her  is able to tolerate.    Social Hx: She is accompanied by her , Eleno.    Review of Systems: No fever, rhinorrhea or cough.      Current Outpatient Prescriptions   Medication Sig Dispense Refill     acetaminophen (TYLENOL) 325 MG tablet Take 2 tablets (650 mg total) by mouth every 6 (six) hours. 100 tablet 0     albuterol (PROAIR HFA) 90 mcg/actuation inhaler Inhale 2 puffs every 4 (four) hours as needed for wheezing. 4 Inhaler 0     albuterol (PROVENTIL) 2.5 mg /3 mL (0.083 %) nebulizer solution Take 2.5 mg by nebulization every 4 (four) hours as needed.       amLODIPine (NORVASC) 5 MG tablet Take 1 tablet (5 mg total) by mouth daily. 90 tablet 2     amLODIPine (NORVASC) 5 MG tablet Take 5 mg by mouth daily.       aspirin 81 MG EC tablet Take 81 mg by mouth daily.       calcium carbonate-vitamin D3 (CALCIUM 600 + D,3,) 600 mg(1,500mg) -400 unit per tablet Take 2 tablets by mouth daily with supper.       cholecalciferol, vitamin D3, 2,000 unit cap Take 1 capsule by mouth daily.       mometasone (ASMANEX TWISTHALER) 220 mcg (30 doses) AePB inhaler Inhale 1 puff daily. 1 each 12     montelukast (SINGULAIR) 10 mg tablet Take 10 mg by mouth daily.       predniSONE (DELTASONE) 10 mg tablet Take 2 tabs (20 mg ) po q day for 7 days, then take 1 tab(10 mg) po q day for 7 days then stop 21 tablet 0     Q- mg tablet TAKE 2 TABLETS (650 MG TOTAL) BY MOUTH EVERY 6 (SIX) HOURS. 100 tablet 3     No current facility-administered medications for this visit.          Objective:    Vitals:    08/08/17 1342  08/08/17 1349   BP: 132/80 126/80   Patient Site: Left Arm Left Arm   Patient Position: Sitting Sitting   Cuff Size: Adult Regular Adult Regular   Pulse: 79    Temp: 97.8  F (36.6  C)    TempSrc: Oral    SpO2: 98%        Gen:  NAD, VSS  Ears with 100% cerumen impaction bilateral.  Canals were normal after irrigation and lighted curette.  Neck supple without adenopathy  Heart normal        ADDITIONAL HISTORY SUMMARIZED (2): None.  DECISION TO OBTAIN EXTRA INFORMATION (1): None.   RADIOLOGY TESTS (1): None.  LABS (1): None.  MEDICINE TESTS (1): None.  INDEPENDENT REVIEW (2 each): None.     Total Data Points: 0

## 2021-06-12 NOTE — PROGRESS NOTES
Ear Cerumen Removal  Date/Time: 8/8/2017 2:05 PM  Performed by: RAEANN CANCHOLA  Authorized by: RAEANN CANCHOLA     Anesthesia:  Local Anesthetic: none  Location details: right ear  Procedure type: curette    Sedation:  Patient sedated: no  Patient tolerance: Patient tolerated the procedure well with no immediate complications  Comments: I performed ear irrigation and used a lighted curette to successfully remove all of the cerumen.

## 2021-06-12 NOTE — PROGRESS NOTES
Ear Cerumen Removal  Date/Time: 8/8/2017 1:55 PM  Performed by: RAEANN CANCHOLA  Authorized by: RAEANN CANCHOLA     Anesthesia:  Local Anesthetic: none  Location details: left ear  Procedure type: curette    Sedation:  Patient sedated: no  Patient tolerance: Patient tolerated the procedure well with no immediate complications  Comments: I performed ear irrigation and used a lighted curette to successfully remove all of the cerumen.

## 2021-06-16 PROBLEM — S06.9XAA TRAUMATIC BRAIN INJURY (H): Status: ACTIVE | Noted: 2018-03-20

## 2021-06-16 PROBLEM — M62.81 GENERALIZED MUSCLE WEAKNESS: Status: ACTIVE | Noted: 2019-08-28

## 2021-06-16 PROBLEM — K43.2 VENTRAL HERNIA, RECURRENT: Status: ACTIVE | Noted: 2019-05-30

## 2021-06-16 PROBLEM — R07.9 CHEST PAIN: Status: ACTIVE | Noted: 2021-05-14

## 2021-06-16 PROBLEM — B44.81: Status: ACTIVE | Noted: 2019-06-01

## 2021-06-16 PROBLEM — R26.9 ABNORMAL GAIT: Status: ACTIVE | Noted: 2019-08-28

## 2021-06-16 NOTE — TELEPHONE ENCOUNTER
Telephone Encounter by Rosa Butts CMA at 4/10/2019 11:38 AM     Author: Rosa Butts CMA Service: -- Author Type: Certified Medical Assistant    Filed: 4/10/2019 11:40 AM Encounter Date: 4/8/2019 Status: Signed    : Rosa Butts CMA (Certified Medical Assistant)       April 9, 2019     Nima Mckenzie MD   to Altagracia Chinchilla RN            6:38 PM   Yeah it's too bad she didn't get the kidney and liver labs I ordered drawn. I think she should have them drawn now as soon as she is able, and then we can resume her itraconazole if they are OK
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
## 2021-06-16 NOTE — TELEPHONE ENCOUNTER
Telephone Encounter by Katherine Rosenberg at 7/17/2019  8:28 AM     Author: Katherine Rosenberg Service: -- Author Type: --    Filed: 7/17/2019  8:29 AM Encounter Date: 7/15/2019 Status: Signed    : Katherine Rosenberg APPROVED:    Approval start date:06/14/2019   Approval end date:12/31/2019    Pharmacy has been notified of approval and will contact patient when medication is ready for pickup.

## 2021-06-16 NOTE — TELEPHONE ENCOUNTER
Telephone Encounter by Roas Butts CMA at 4/10/2019  3:18 PM     Author: Rosa Butts CMA Service: -- Author Type: Certified Medical Assistant    Filed: 4/10/2019  3:19 PM Encounter Date: 4/8/2019 Status: Signed    : Rosa Butts CMA (Certified Medical Assistant)       Nima Mckenzie MD Hoops, Dawn, RN Cc: Rosa Butts CMA   Caller: Unspecified (2 days ago, 11:45 AM)             OK< that will work. Many thanks   Demetrius

## 2021-06-16 NOTE — TELEPHONE ENCOUNTER
Telephone Encounter by Ann Parrish at 2/22/2019 10:33 AM     Author: Ann Parrish Service: -- Author Type: --    Filed: 2/22/2019 10:34 AM Encounter Date: 2/19/2019 Status: Signed    : Ann Parrish       [facility] team  [phone]    PA has been initiated.

## 2021-06-16 NOTE — TELEPHONE ENCOUNTER
Telephone Encounter by Ann Parrish at 2/26/2019  4:39 PM     Author: Ann Parrish Service: -- Author Type: --    Filed: 2/26/2019  4:40 PM Encounter Date: 2/19/2019 Status: Signed    : Ann Parrish APPROVED:    Approval start date: 2/22/2019  Approval end date: 2/22/2020    Pharmacy has been notified of approval and will contact patient when medication is ready for pickup.

## 2021-06-17 NOTE — PATIENT INSTRUCTIONS - HE
Patient Instructions by Dayan Acuña CMA at 3/26/2019  1:00 PM     Author: Dayan Acuña CMA Service: -- Author Type: Certified Medical Assistant    Filed: 3/26/2019  1:11 PM Encounter Date: 3/26/2019 Status: Addendum    : Yuli Mata MD (Physician)    Related Notes: Original Note by Dayan Acuña CMA (Certified Medical Assistant) filed at 3/26/2019  1:02 PM         Patient Education   Personalized Prevention Plan  You are due for the preventive services outlined below.  Your care team is available to assist you in scheduling these services.  If you have already completed any of these items, please share that information with your care team to update in your medical record.  Health Maintenance   Topic Date Due   ? ASTHMA FOLLOW-UP  1935   ? ZOSTER VACCINES (2 of 3) 04/17/2008   ? FALL RISK ASSESSMENT  03/20/2019   ? DXA SCAN  05/03/2019   ? ASTHMA CONTROL TEST  11/02/2019   ? ADVANCE DIRECTIVES DISCUSSED WITH PATIENT  03/20/2023   ? TD 18+ HE  03/20/2028   ? PNEUMOCOCCAL POLYSACCHARIDE VACCINE AGE 65 AND OVER  Completed   ? INFLUENZA VACCINE RULE BASED  Completed   ? PNEUMOCOCCAL CONJUGATE VACCINE FOR ADULTS (PCV13 OR PREVNAR)  Completed        Patient Education   Understanding USDA MyPlate  The USDA (US Department of Agriculture) has guidelines to help you make healthy food choices. These are called MyPlate. MyPlate shows the food groups that make up healthy meals using the image of a place setting. Before you eat, think about the healthiest choices for what to put onto your plate or into your cup or bowl. To learn more about building a healthy plate, visit www.choosemyplate.gov.       The Food Groups    Fruits: Any fruit or 100% fruit juice counts as part of the Fruit Group. Fruits may be fresh, canned, frozen, or dried, and may be whole, cut-up, or pureed. Make half your plate fruits and vegetables.    Vegetables: Any vegetable or 100% vegetable juice counts as a member  of the Vegetable Group. Vegetables may be fresh, frozen, canned, or dried. They can be served raw or cooked and may be whole, cut-up, or mashed. Make half your plate fruits and vegetables.     Grains: All foods made from grains are part of the Grains Group. These include wheat, rice, oats, cornmeal, and barley such as bread, pasta, oatmeal, cereal, tortillas, and grits. Grains should be no more than a quarter of your plate. At least half of your grains should be whole grains.    Protein: This group includes meat, poultry, seafood, beans and peas, eggs, processed soy products (like tofu), nuts (including nut butters), and seeds. Make protein choices no more than a quarter of your plate. Meat and poultry choices should be lean or low fat.    Dairy: All fluid milk products and foods made from milk that contain calcium, like yogurt and cheese are part of the Dairy Group. (Foods that have little calcium, such as cream, butter, and cream cheese, are not part of the group.) Most dairy choices should be low-fat or fat-free.    Oils: These are fats that are liquid at room temperature. They include canola, corn, olive, soybean, and sunflower oil. Foods that are mainly oil include mayonnaise, certain salad dressings, and soft margarines. You should have only 5 to 7 teaspoons of oils a day. You probably already get this much from the food you eat.  Use [employer] to Help Build Your Meals  The SuperTracker can help you plan and track your meals and activity. You can look up individual foods to see or compare their nutritional value. You can get guidelines for what and how much you should eat. You can compare your food choices. And you can assess personal physical activities and see ways you can improve. Go to www.choosemyplate.gov/supertracker/.    5141-5192 [employer]. [address] [place] [address], [place] [address]. All rights reserved. This information is not intended as a substitute for professional medical care.  Always follow your healthcare professional's instructions.           Patient Education   Activities of Daily Living  Your Health Risk Assessment indicates you have difficulties with activities of daily living such as eating, getting dressed, grooming, bathing, walking, or using the toilet. Please make a follow up appointment for us to address this issue in more detail.     Patient Education   Instrumental Activities of Daily Living  Your Health Risk Assessment indicates you have difficulties with instrumental activities of daily living which include laundry, housekeeping, banking, shopping, using the telephone, food preparation, transportation, or taking your own medications. Please make a follow up appointment for us to address this issue in more detail.    [employer]. has resources available on the following website: https://[email or web address].org/caregivers.[email or web address]     Also, here is a local agency that provides help with meals and other assistance:   [facility]: [phone]       Advance Directive  Patients advance directive was discussed and I am comfortable with the patients wishes.  Patient Education   Personalized Prevention Plan  You are due for the preventive services outlined below.  Your care team is available to assist you in scheduling these services.  If you have already completed any of these items, please share that information with your care team to update in your medical record.  Health Maintenance   Topic Date Due   ? ASTHMA FOLLOW-UP  1935   ? ZOSTER VACCINES (2 of 3) 04/17/2008   ? DXA SCAN  05/03/2019   ? ASTHMA CONTROL TEST  11/02/2019   ? FALL RISK ASSESSMENT  03/26/2020   ? ADVANCE DIRECTIVES DISCUSSED WITH PATIENT  03/20/2023   ? TD 18+ HE  03/20/2028   ? PNEUMOCOCCAL POLYSACCHARIDE VACCINE AGE 65 AND OVER  Completed   ? INFLUENZA VACCINE RULE BASED  Completed   ? PNEUMOCOCCAL CONJUGATE VACCINE FOR ADULTS (PCV13 OR PREVNAR)  Completed

## 2021-06-18 NOTE — LETTER
Letter by Nima Mckenzie MD at      Author: Nima Mckenzie MD Service: -- Author Type: --    Filed:  Encounter Date: 1/25/2019 Status: (Other)       Yuli Mata MD  [address] [facility] [address]                                  January 25, 2019    Patient: Veronika Hernandez   MR Number: [id number]   YOB: 1935   Date of Visit: 1/25/2019     Dear Dr. Esperanza MD:    Thank you for referring Veronika Hernandez to me for evaluation. Below are the relevant portions of my assessment and plan of care.    If you have questions, please do not hesitate to call me. I look forward to following Veronika along with you.    Sincerely,        Nima Mckenzie MD          CC  No Recipients  Nima Mckenzie MD  1/25/2019  3:07 PM  Sign at close encounter  Assessment and Plan:  Veronika Hernandez is a 82 y.o. 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  She presented to our clinic Nov 2018 to transfer her pulmonary care here after her previous [facility] pulmonologist has retired.   Over the past 10 years since she was diagnosed with ABPA, she has had on average about 1 hospitalization and 3-4 outpatient prednisone burst treatments per year for ABPA flares.      Her ABPA diagnosis was confirmed with a repeat total IgE level greater than 1000, and an elevated Aspergillus specific IgE.  Allergy clinic could not perform skin prick testing due to the patient's thin skin. Baseline CK, liver & kidney function tests were normal.  I discussed the pros and cons of trying antifungal therapy to decrease the number of flareups of ABPA extensively with the patient and her daughter.  I informed them that while this treatment is usually recommended for severe cases of ABPA in patients who cannot get off of prednisone, there is no consensus regarding its use to decrease in number of flareups in people  who are asymptomatic and do not need prednisone between attacks.  I reviewed the potential side effects of itraconazole with them, including potentially life-threatening liver and kidney toxicity.  They verbalized their understanding and stated that they will discuss it amongst themselves and other family members and get back to us.     1) If they do decide to proceed with antifungal therapy:  -Would start itraconazole 100 mg p.o. solution twice daily (lower than usual dose due to the patient's low weight, based on 2.5 mg/kg)  -We would recheck IgE, CK, and CMP initially 1 month after starting therapy, possibly with longer intervals thereafter if she tolerates it well  -Labs and follow-up appointment have been ordered in case they decide to proceed with therapy, however the patient and her daughter understand that they do not have to fill them should they decide not to try therapy.  -Standing orders for prednisone tapers and Tessalon Perles for cough ordered to be used as needed if the patient has recurrent ABPA flares     2) Continue Asmanex and albuterol inhaler as needed for now for COPD        CCx: Repeated ABPA flareup    HPI: Patient currently denies shortness of breath or other she did however have a flareup of her ABPA/reactive airways about 6 weeks ago requiring prednisone burst.  They are currently out of cough suppressants and prednisone refills and are requesting them.    ROS:  A review of 12 organ systems was performed with pertinent positives and negatives noted in the HPI.      Current Meds:  Current Outpatient Medications   Medication Sig   ? acetaminophen (TYLENOL) 325 MG tablet Take 2 tablets (650 mg total) by mouth every 6 (six) hours.   ? acetaminophen-codeine (TYLENOL #3) 300-30 mg per tablet TAKE 1 TABLET BY MOUTH EVERY 6 HOURS AS NEEDED FOR PAIN   ? albuterol (PROAIR HFA) 90 mcg/actuation inhaler Inhale 2 puffs every 4 (four) hours as needed for wheezing.   ? albuterol (PROVENTIL) 2.5 mg /3 mL  (0.083 %) nebulizer solution INHALE ONE VIAL VIA NEBULIZER EVERY FOUR HOURS AS NEEDED   ? amLODIPine (NORVASC) 5 MG tablet Take 1 tablet (5 mg total) by mouth daily.   ? ASMANEX TWISTHALER 220 mcg (30 doses) AePB inhaler INHALE 1 PUFF DAILY.   ? aspirin 81 MG EC tablet Take 81 mg by mouth daily.   ? calcium carbonate-vitamin D3 (CALCIUM 600 + D,3,) 600 mg(1,500mg) -400 unit per tablet Take 2 tablets by mouth daily with supper.   ? cholecalciferol, vitamin D3, 2,000 unit cap Take 1 capsule by mouth daily.   ? mometasone (ASMANEX TWISTHALER) 220 mcg (30 doses) AePB inhaler Inhale 1 puff daily.   ? PAIN RELIEVER 325 mg tablet TAKE 2 TABLETS (650 MG TOTAL) BY MOUTH EVERY 6 (SIX) HOURS.   ? predniSONE (DELTASONE) 10 mg tablet Take 2 tabs (20 mg ) po q day for 7 days, then take 1 tab(10 mg) po q day for 7 days then stop.   ? benzonatate (TESSALON PERLES) 100 MG capsule Take 1 capsule (100 mg total) by mouth 3 (three) times a day as needed for cough.       Labs:  No results found for this or any previous visit (from the past 72 hour(s)).    I have personally reviewed all pertinent imaging studies and PFT results unless otherwise noted.    Imaging studies:  Xr Injection Major Joint    Result Date: 7/3/2018  1. RIGHT SHOULDER JOINT INJECTION 2. FLUOROSCOPIC GUIDANCE 7/3/2018 11:59 AM INDICATION: Pain. PROCEDURE: Procedure and risks explained and consent received. The skin was prepped and draped in sterile fashion. 5 mL 1% lidocaine infused in local soft tissues. Under direct fluoroscopic guidance, a 22 gauge spinal needle was introduced into the joint space. Position was confirmed with injection of nonionic contrast material. INJECTION: 1 mL of 80 mg per mL of Depo-Medrol.  4 mL of 0.5% bupivacaine 5 mg/mL . COMPLICATIONS: None. SPECIMEN: None. RADIOLOGIC SUPERVISION AND INTERPRETATION: Fluoroscopy demonstrates intraarticular needle tip position. FLUOROSCOPIC TIME: 1.4 minutes. NUMBER OF IMAGES: 2.     CONCLUSION:  Fluoroscopic-guided right shoulder joint injection.    Mr Knee Without Contrast Left    Result Date: 7/3/2018  [facility] MR KNEE WO CONTRAST LEFT 7/3/2018 10:41 AM                                                     INDICATION: Pain in left knee. TECHNIQUE: Unenhanced. COMPARISON: None. FINDINGS: MEDIAL COMPARTMENT: The medial meniscus is intact. Articular cartilage smooth and intact. LATERAL COMPARTMENT: The lateral meniscus is intact. Moderate-sized area of full-thickness articular cartilage loss along the posterior undersurface of the lateral femoral condyle with associated subchondral edema. PATELLOFEMORAL COMPARTMENT: Patella is centrally seated. There is a moderate-sized, complex popliteal cyst with some fluid extending caudally from the cyst margin suggesting recent rupture. Small knee joint effusion without evidence of intra-articular loose bodies. Advanced chondromalacia patella with full-thickness articular cartilage loss involving the medial and lateral patellar facets and median patellar ridge and broad-based areas of deep partial-thickness articular cartilage loss in the lateral portion of the trochlear groove. LIGAMENTS: Cruciate ligaments are intact. Collateral ligaments are intact. POSTEROMEDIAL CORNER: Distal semimembranosus tendon intact. Pes anserine tendons intact. POSTEROLATERAL CORNER: Popliteus tendon intact. Distal biceps tendon intact. EXTENSOR MECHANISM: Extensor tendons are intact. Retinacula are intact. BONES AND SOFT TISSUES: Tiny acute nondisplaced subchondral fracture of the lateral tibial plateau as seen on series 6 image 12 and series 5 image 18.     CONCLUSION: 1.  Tiny acute subchondral fracture of the lateral tibial plateau. 2.  Advanced chondromalacia patella. 3.  Moderate-sized, complex popliteal cyst with fluid extending caudally from the cyst margin suggesting recent rupture. 4.  Moderate-sized area of full-thickness articular cartilage loss posterior undersurface of  the lateral femoral condyle.         Physical Exam:  /76   Pulse 76   Resp 24   SpO2 93% Comment: RA  General - Well nourished  Ears/Mouth -  OP pink moist, no thrush  Neck - no cervical lymphadenopathy  Lungs - Clear to ausculation bilaterally   CVS - regular rhythm with no murmurs, rubs or gallups  Abdomen - soft, NT, ND, NABS  Ext - no cyanosis, clubbing or edema  Skin - no rash  Psychology - alert and oriented, answers appropriate        Electronically signed by:    Demetrius Mckenzie MD  [facility]

## 2021-06-18 NOTE — PROGRESS NOTES
Family Medicine Office Visit  [facility]  Patient Name: Veronika Hernandez  Patient Age: 82 y.o.  YOB: 1935  MRN: [id number]    Date of Visit: 2018  Reason for Office Visit:   Chief Complaint   Patient presents with     Shoulder Pain     R shoulder pain      Leg Swelling     Swelling since last Tuesday on L leg. And pain. Abrasion on L shin as well.           Assessment / Plan / Medical Decision Makin. Left leg pain  Will do US of the left leg and r/o DVT.  Home health referral for gait training and strengthening  - Ambulatory referral to Home Health  - US Venous Leg Left; Future    2. Right shoulder pain  XR of the shoulder ordered - more likely to be related to ?pinched nerve in neck and can think abou gabapentin once a day  - XR Shoulder Right 2 or More VWS; Future  - XR Shoulder Right 2 or More VWS    3. Right arm pain  ?nerve pain radiating from the neck, recommend PT    4. Neck pain  PT at home and will do XR and call with the results of all testing.  - Ambulatory referral to Home Health  - XR Cervical Spine 4 - 5 VWS; Future  - XR Cervical Spine 4 - 5 VWS        Health Maintenance Review  Health Maintenance   Topic Date Due     ASTHMA FOLLOW-UP  1935     ASTHMA CONTROL TEST  2019     FALL RISK ASSESSMENT  2019     DXA SCAN  2019     ADVANCE DIRECTIVES DISCUSSED WITH PATIENT  2023     TD 18+ HE  2028     PNEUMOCOCCAL POLYSACCHARIDE VACCINE AGE 65 AND OVER  Completed     INFLUENZA VACCINE RULE BASED  Completed     PNEUMOCOCCAL CONJUGATE VACCINE FOR ADULTS (PCV13 OR PREVNAR)  Completed     ZOSTER VACCINE  Completed         I am having Ms. Hernandez maintain her cholecalciferol (vitamin D3), calcium carbonate-vitamin D3, aspirin, albuterol, amLODIPine, montelukast, acetaminophen, PAIN RELIEVER, amLODIPine, albuterol, mometasone, and predniSONE.      HPI:  Veronika Hernandez is a 82 y.o. year old who presents to the office  "today for left leg pain.  Hx of brain injury and some weakness to the right side of the body.  Baseline walking on the toes on the right side so left side always slightly sore.  However, for the past week some tenderness of the left calf and feels like there is a \"mass\" there.  No increased warmth, no tenderness of the skin, no erythema.  Using walker at home and trying to move around as much as possible.  Then for the past week some right shoulder and arm pain.  Pain down the arm and worse with movement.  No numbness or tingling.  Some associated neck pain.  Feels like most of the pain in the arm/humeral region more than the shoulder.  No hx of injury.  Would like to do some PT to try and keep strength up - no falls but would like to maintain mobility as much as possible.        Review of Systems- pertinent positive in bold:  Constitutional: Fever, chills, night sweats, fainting, weight change, fatigue, seizures, dizziness, sleeping difficulties, loud snoring/pauses in breathing  Eyes: change in vision, blurred or double vision, redness/eye pain  Ears, nose, mouth, throat: change in hearing, ear pain, hoarseness, difficulty swallowing, sores in the mouth or throat  Respiratory: shortness of breath, cough, bloody sputum, wheezing  Cardiovascular: chest pain, palpitations   Gastrointestinal: abdominal pain, heartburn/indigestion, nausea/vomiting, change in appetite, change in bowel habits, constipation or diarrhea, rectal bleeding/dark stools, difficulty swallowing  Urinary: painful urination, frequent urination, urinary urgency/incontinence, blood in urine/dark urine, nocturia  Musculoskeletal: backache/back pain (new or increasing), weakness, joint pain/stiffness (new or increasing), muscle cramps, swelling of hands, feet, ankles, leg pain/redness  Skin: change in moles/freckles, rash, nodules  Hematologic/lymphatic: swollen lymph glands, abnormal bruising/bleeding  Endocrine: excessive thirst/urination, cold or " heat intolerance  Neurologic/emotional: worrisome memory change, numbness/tingling, anxiety, mood swings      Current Scheduled Meds:  Outpatient Encounter Prescriptions as of 6/11/2018   Medication Sig Dispense Refill     acetaminophen (TYLENOL) 325 MG tablet Take 2 tablets (650 mg total) by mouth every 6 (six) hours. 100 tablet 0     albuterol (PROAIR HFA) 90 mcg/actuation inhaler Inhale 2 puffs every 4 (four) hours as needed for wheezing. 4 Inhaler 0     albuterol (PROVENTIL) 2.5 mg /3 mL (0.083 %) nebulizer solution INHALE ONE VIAL VIA NEBULIZER EVERY FOUR HOURS AS NEEDED 75 mL 4     amLODIPine (NORVASC) 5 MG tablet Take 5 mg by mouth daily.       amLODIPine (NORVASC) 5 MG tablet Take 1 tablet (5 mg total) by mouth daily. 90 tablet 4     aspirin 81 MG EC tablet Take 81 mg by mouth daily.       calcium carbonate-vitamin D3 (CALCIUM 600 + D,3,) 600 mg(1,500mg) -400 unit per tablet Take 2 tablets by mouth daily with supper.       cholecalciferol, vitamin D3, 2,000 unit cap Take 1 capsule by mouth daily.       mometasone (ASMANEX TWISTHALER) 220 mcg (30 doses) AePB inhaler Inhale 1 puff daily. 3 each 3     montelukast (SINGULAIR) 10 mg tablet Take 10 mg by mouth daily.       PAIN RELIEVER 325 mg tablet TAKE 2 TABLETS (650 MG TOTAL) BY MOUTH EVERY 6 (SIX) HOURS. 100 tablet 3     predniSONE (DELTASONE) 10 mg tablet Take 2 tabs (20 mg ) po q day for 7 days, then take 1 tab(10 mg) po q day for 7 days then stop 21 tablet 0     No facility-administered encounter medications on file as of 6/11/2018.      Past Medical History:   Diagnosis Date     Asthma      Past Surgical History:   Procedure Laterality Date     KS HEMORRHOIDECTOMY INTERNAL RUBBER BAND LIGATIONS      Description: Hemorrhoidectomy;  Proc Date: 07/01/2008;     KS LAP, VENTRAL HERNIA REPAIR,REDUCIBLE      Description: Laparoscopy Repair Of Umbilical Hernia;  Recorded: 06/10/2010;     KS LAP,CHOLECYSTECTOMY      Description: Cholecystectomy Laparoscopic;   Recorded: 06/10/2010;     DE PERC VERTEB AUGMENT/ KYPHOPLAST, THOR      Description: Percutaneous Vertebral Augmentation Thoracic;  Proc Date: 04/01/2009;     Social History   Substance Use Topics     Smoking status: Never Smoker     Smokeless tobacco: Never Used     Alcohol use Yes      Comment: 1 drink a day       Objective / Physical Examination:  Vitals:    06/11/18 1156   BP: 118/88   Patient Site: Left Arm   Patient Position: Sitting   Cuff Size: Adult Regular   Pulse: 75   SpO2: 95%     Wt Readings from Last 3 Encounters:   03/20/18 (!) 92 lb (41.7 kg)   11/05/17 110 lb (49.9 kg)   06/13/17 106 lb 14.4 oz (48.5 kg)     BP Readings from Last 3 Encounters:   06/11/18 118/88   03/20/18 138/90   11/05/17 164/75     There is no height or weight on file to calculate BMI.     General Appearance: Alert and oriented, cooperative, affect appropriate, speech clear, in no apparent distress  Head: Normocephalic, atraumatic  Ears: Tympanic membrane clear with landmarks well visualized bilaterally  Eyes: PERRL, fundi appear clear bilaterally. EOMI. Conjunctivae clear and sclerae non-icteric  Nose: Septum midline, nares patent, no visible polyps, mucosa moist and without drainage  Throat: Lips and mucosa moist. Teeth in good repair, pharynx without erythema or exudate  Neck: Supple, trachea midline. No cervical adenopathy  Back: Symmetrical and nontender  Lungs: Clear to auscultation bilaterally. Normal inspiratory and expiratory effort  Cardiovascular: Regular rate, normal S1, S2. No murmurs, rubs, or gallops  Abdomen: Bowel sounds active all four quadrants. Soft, non-tender. No hepatomegaly or splenomegaly. No bruits detected.   Extremities: Pulses 2+ and equal throughout. No edema. Strength equal throughout. Tender to palpation of the left calf distal to the knee, no erythema but bulging palpated.  Full ROM of the shoulders  But pain with movement on the right side.    Integumentary: Warm and dry. Without suspicious  looking lesions  Neuro: Alert and oriented, follows commands appropriately.     Orders Placed This Encounter   Procedures     XR Cervical Spine 4 - 5 VWS     XR Shoulder Right 2 or More VWS     US Venous Leg Left     Ambulatory referral to Home Health   Followup: No Follow-up on file. earlier if needed.          Yuli Mata MD

## 2021-06-18 NOTE — LETTER
Letter by Tanner Sharp MD at      Author: Tanner Sharp MD Service: -- Author Type: --    Filed:  Encounter Date: 1/21/2019 Status: (Other)       Veronika Hernandez  [address] [facility] [address]             January 21, 2019         Dear Ms. Hernandez,    Below are the results from your recent visit:    Resulted Orders   Aspergillus fumagatus IgE   Result Value Ref Range    Aspergillus fumigatus IgE 42.80 (H) <0.35 kU/L    Narrative    ImmunoCAP Specific IgE Blood Test Quantitative Scoring                        IgE (kU/L  Level  -----------------------------------------------------------------------------    <0.35   Absent/undetectable    0.35-0.69  Low    0.70-3.49  Moderate    3.50-17.49  High    >=17.50  Very High  -----------------------------------------------------------------------------  *Please note, a high or low specific IgE level may not   necessarily correlate to the degree of symptom severity,   as each person's symptomatic threshold varies.       Your Aspergillus test is positive which is consistent with ABPA.    Please call with questions or contact us using [email or web address].    Sincerely,        Electronically signed by Tanner Sharp MD

## 2021-06-19 NOTE — LETTER
Letter by Yuli Mata MD at      Author: Yuli Mata MD Service: -- Author Type: --    Filed:  Encounter Date: 3/28/2019 Status: (Other)         Veronika Hernandez  [address] [facility] [address]     March 28, 2019     Dear Ms. Hernandez,    Below are the results from your recent visit:    Resulted Orders   HM1 (CBC with Diff)   Result Value Ref Range    WBC 5.3 4.0 - 11.0 thou/uL    RBC 4.66 3.80 - 5.40 mill/uL    Hemoglobin 15.0 12.0 - 16.0 g/dL    Hematocrit 45.0 35.0 - 47.0 %    MCV 97 80 - 100 fL    MCH 32.1 27.0 - 34.0 pg    MCHC 33.2 32.0 - 36.0 g/dL    RDW 11.5 11.0 - 14.5 %    Platelets 344 140 - 440 thou/uL    MPV 6.7 (L) 7.0 - 10.0 fL    Neutrophils % 64 50 - 70 %    Lymphocytes % 24 20 - 40 %    Monocytes % 10 2 - 10 %    Eosinophils % 2 0 - 6 %    Basophils % 0 0 - 2 %    Neutrophils Absolute 3.4 2.0 - 7.7 thou/uL    Lymphocytes Absolute 1.3 0.8 - 4.4 thou/uL    Monocytes Absolute 0.5 0.0 - 0.9 thou/uL    Eosinophils Absolute 0.1 0.0 - 0.4 thou/uL    Basophils Absolute 0.0 0.0 - 0.2 thou/uL        Result is/are normal.  Continue current medications.  Call if any questions or concerns.     Please call with questions or contact us using [email or web address].    Sincerely,        Electronically signed by Yuli Mata MD

## 2021-06-19 NOTE — LETTER
Letter by Nima Mckenzie MD at      Author: Nima Mckenzie MD Service: -- Author Type: --    Filed:  Encounter Date: 6/14/2019 Status: (Other)         Yuli Mata MD  [address] [facility] [address]                                  June 16, 2019    Patient: Veronika Hernandez   MR Number: [id number]   YOB: 1935   Date of Visit: 6/14/2019     Dear Dr. Esperanza MD:    Thank you for referring Veronika Hernandez to me for evaluation. Below are the relevant portions of my assessment and plan of care.    If you have questions, please do not hesitate to call me. I look forward to following Veronika along with you.    Sincerely,        Nima Mckenzie MD          CC  No Recipients  Nima Mckenzie MD  6/16/2019  9:17 AM  Sign at close encounter  Assessment:  Veronika Hernandez is an 83 y.o. 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  She presented to our clinic Nov 2018 to transfer her pulmonary care here after her previous [facility] pulmonologist has retired.  Over the past 10 years since she was diagnosed with ABPA, she had on average about 1 hospitalization and 3-4 outpatient prednisone burst treatments per year for ABPA flares.       Her ABPA diagnosis was confirmed with a repeat total IgE level greater than 1000, and an elevated Aspergillus specific IgE.  Allergy clinic could not perform skin prick testing due to the patient's thin skin. Baseline CK, liver & kidney function tests were normal.  After I discussed the pros and cons of trying antifungal therapy to decrease the number of flareups of ABPA extensively with the patient and her family, they  decided to attempt antifungal therapy to decrease the frequency of flares. Itraconazole was therefore started in early February 2019 and has been continued since, albeit with a 3 week break late March/early April due to  inability to get follow-up testing. She has tolerated it well without evidence of liver, renal injury or other side effects, however her IgE continue to rise and she has continued to have 2 further asthma/ABPA exacerbations while on it, the most recent requiring hospitalization from 6/4-6/6/2019     Plan:  1) ABPA:  recent flare, still on a prednisone taper. discussed in depth with patient and her daughter. As she is not improving with itraconazole therapy we will discontinue it at this time. They do however wish to attempt voriconazole therapy, which may have better efficacy due to the potential for better absorption   - d/c itraconazole, start voriconazole: 400 mg twice per day for 1 day, then 200 mg po two times a day thereafter (they will plan to start taking 5 days before she can have lab tests drawn)     - check voriconazole level & liver/renal tests 5 days after starting     - check Immunoglobulin E & liver/renal tests 1 month later   -Taper off prednisone - 5 days of 20 mg, then 5 days of 10 mg, then 4 days 5 mg, then stop      2) Asthma/COPD overlap   - Continue Asmanex and albuterol inhaler as needed     Follow-up in our clinic in 1-2 months to follow-up lab tests      CCx: ABPA    HPI: Her breathing has been feeling okay since she was discharged hospital earlier this month for an ABPA flare.  She has been continuing to take the itraconazole as prescribed.  She denies any cough, fevers chills, chest pain, abdominal pain, or other complaints.    ROS:  A review of 12 organ systems was performed with pertinent positives and negatives noted in the HPI.      Current Meds:  Current Outpatient Medications   Medication Sig   ? acetaminophen (TYLENOL) 325 MG tablet Take 2 tablets (650 mg total) by mouth every 6 (six) hours.   ? albuterol (PROVENTIL) 2.5 mg /3 mL (0.083 %) nebulizer solution INHALE ONE VIAL VIA NEBULIZER EVERY FOUR HOURS AS NEEDED   ? albuterol (PROVENTIL) 2.5 mg /3 mL (0.083 %) nebulizer solution  Take 3 mL (2.5 mg total) by nebulization 4 (four) times a day. Continue until f/u with Pulmonologist on 6/11/19   ? amLODIPine (NORVASC) 5 MG tablet Take 1 tablet (5 mg total) by mouth daily.   ? ASMANEX TWISTHALER 220 mcg (30 doses) AePB inhaler INHALE 1 PUFF DAILY.   ? calcium carbonate-vitamin D3 (CALCIUM 600 + D,3,) 600 mg(1,500mg) -400 unit per tablet Take 1 tablet by mouth 2 (two) times a day with meals.          ? polyethylene glycol (MIRALAX) 17 gram packet Take 1 packet (17 g total) by mouth daily as needed (for constipation).   ? predniSONE (DELTASONE) 10 mg tablet Take 2 tabs daily for 5 days, then 1 tabs daily for 5 days, then 1/2 tabs daily for 4 days, then stop..   ? voriconazole (VFEND) 200 MG tablet Take 1 tablet (200 mg total) by mouth 2 (two) times a day.       Labs:  No results found for this or any previous visit (from the past 72 hour(s)).    I have personally reviewed all pertinent imaging studies and PFT results unless otherwise noted.    Imaging studies:  Xr Chest 2 Views    Result Date: 6/4/2019  EXAM: XR CHEST 2 VIEWS LOCATION: [facility] DATE/TIME: 6/4/2019 11:41 AM INDICATION: Chest Pain COMPARISON: 9/30/2019 FINDINGS: No change. Heart size upper limits of normal. Pulmonary vessels normal. Lungs appear clear. Diffuse osteopenia previous lumbar surgery and vertebroplasty procedures. Scoliosis. Degenerative changes.        Physical Exam:  /60   Pulse 75   Ht 5' (1.524 m)   Wt 102 lb (46.3 kg)   SpO2 92%   Breastfeeding? No   BMI 19.92 kg/m     General - Well nourished  Ears/Mouth -  OP pink moist, no thrush  Neck - no cervical lymphadenopathy  Lungs - Clear to ausculation bilaterally  CVS - regular rhythm with no murmurs, rubs or gallups  Abdomen - soft, NT, ND, NABS  Ext - no cyanosis, clubbing or edema  Skin - no rash  Psychology - alert and oriented, answers appropriate        Electronically signed by:    Demetrius Mckenzie MD  [facility] Pulmonary and Critical Care  Medicine

## 2021-06-19 NOTE — LETTER
Letter by Yuli Mata MD at      Author: Yuli Mata MD Service: -- Author Type: --    Filed:  Encounter Date: 3/26/2019 Status: (Other)         3/26/2019    Veronika Hernandez   [address] [facility] [address]   : 1935       Patient prescription:      RX:  Motorized wheelchair    DX:      ICD-10-CM    1. Routine general medical examination at a health care facility Z00.00 HM1(CBC and Differential)     HM1 (CBC with Diff)   2. Chronic obstructive pulmonary disease, unspecified COPD type (H) J44.9    3. Hyperparathyroidism (H) E21.3    4. Allergic bronchopulmonary aspergillosis (H) B44.81    5. Osteoporosis, unspecified osteoporosis type, unspecified pathological fracture presence M81.0    6. Traumatic brain injury, without loss of consciousness, subsequent encounter S06.9X0D    7. Ventral hernia without obstruction or gangrene K43.9    8. Chronic low back pain without sciatica, unspecified back pain laterality M54.5     G89.29    9. Memory Lapses Or Loss R41.3    10. Urge Incontinence Of Urine N39.41         LENGTH OF NEED (if appropriate):  99 years    Yuli Mata MD  [facility] [id number]   [place] License [id number]

## 2021-06-19 NOTE — LETTER
Letter by Nima Mckenzie MD at      Author: Nima Mckenzie MD Service: -- Author Type: --    Filed:  Encounter Date: 4/16/2019 Status: (Other)         Yuli Mata MD  [address] [facility] [address]                                  April 16, 2019    Patient: Veronika Hernandez   MR Number: [id number]   YOB: 1935   Date of Visit: 4/16/2019     Dear Dr. Esperanza MD:    Thank you for referring Veronika Hernandez to me for evaluation. Below are the relevant portions of my assessment and plan of care.    If you have questions, please do not hesitate to call me. I look forward to following Veronika along with you.    Sincerely,        Nima Mckenzie MD          CC  No Recipients  Nima Mckenzie MD  4/16/2019  3:35 PM  Sign at close encounter  Assessment and Plan:  Veronika Hernandez is an 83 y.o. 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  She presented to our clinic Nov 2018 to transfer her pulmonary care here after her previous [facility] pulmonologist has retired.  Over the past 10 years since she was diagnosed with ABPA, she has had on average about 1 hospitalization and 3-4 outpatient prednisone burst treatments per year for ABPA flares.       Her ABPA diagnosis was confirmed with a repeat total IgE level greater than 1000, and an elevated Aspergillus specific IgE.  Allergy clinic could not perform skin prick testing due to the patient's thin skin. Baseline CK, liver & kidney function tests were normal.   After I discussed the pros and cons of trying antifungal therapy to decrease the number of flareups of ABPA extensively with the patient and her daughter, they discussed it with other family members and decided to proceed with antifungal therapy     1)  ABPA: The patient started taking itraconazole for a month and experienced no side effects from it.  Before  starting the itraconazole, she began having an ABPA/asthma flare, for which she started a course of prednisone at the same time as starting her itraconazole.  The patient's daughter states that the course seemed much shorter and less severe than previous flares which she had treated with prednisone alone.  Unfortunately, the patient's follow-up liver & kidney labs were not drawn as ordered.  They were drawn this morning and are normal, however she has been off of the itraconazole for 3 weeks now due to having only been given 1 month of initial supply.   -Resume itraconazole 100 mg p.o. Daily for 30 days, will provide 1 refill to avoid missing doses   -Repeat CMP, CK, and IgE in 1 month with pulmonary clinic follow-up     2) COPD   - Continue Asmanex and albuterol inhaler as needed    Follow-up in our clinic in 4-6 weeks to review labs on itraconazole and consider ongoing treatment at that time    CCx: Recurrent ABPA flares    HPI: Patient states that her breathing currently feels very well.  She has no other acute complaints.  She took the itraconazole for a month and experienced no side effects from it.  Before starting the itraconazole, she began having an ABPA/asthma flare, for which she started a course of prednisone at the same time as starting her itraconazole.  The patient's daughter states that the course seemed much shorter and less severe than previous flares which she had treated with prednisone alone.    ROS:  A review of 12 organ systems was performed with pertinent positives and negatives noted in the HPI.      Current Meds:  Current Outpatient Medications   Medication Sig   ? acetaminophen (TYLENOL) 325 MG tablet Take 2 tablets (650 mg total) by mouth every 6 (six) hours.   ? albuterol (PROAIR HFA) 90 mcg/actuation inhaler Inhale 2 puffs every 4 (four) hours as needed for wheezing.   ? albuterol (PROVENTIL) 2.5 mg /3 mL (0.083 %) nebulizer solution INHALE ONE VIAL VIA NEBULIZER EVERY FOUR HOURS AS  NEEDED   ? amLODIPine (NORVASC) 5 MG tablet Take 1 tablet (5 mg total) by mouth daily.   ? ASMANEX TWISTHALER 220 mcg (30 doses) AePB inhaler INHALE 1 PUFF DAILY.   ? calcium carbonate-vitamin D3 (CALCIUM 600 + D,3,) 600 mg(1,500mg) -400 unit per tablet Take 2 tablets by mouth daily with supper.   ? cholecalciferol, vitamin D3, 2,000 unit cap Take 1 capsule by mouth daily.   ? itraconazole (SPORANOX) 100 mg capsule Take 1 capsule (100 mg total) by mouth daily.   ? mometasone (ASMANEX TWISTHALER) 220 mcg (30 doses) AePB inhaler Inhale 1 puff daily.   ? predniSONE (DELTASONE) 10 mg tablet Take 2 tabs (20 mg ) po q day for 7 days, then take 1 tab(10 mg) po q day for 7 days then stop.       Labs:  Recent Results (from the past 72 hour(s))   Comprehensive Metabolic Panel   Result Value Ref Range    Sodium 140 136 - 145 mmol/L    Potassium 3.8 3.5 - 5.0 mmol/L    Chloride 100 98 - 107 mmol/L    CO2 30 22 - 31 mmol/L    Anion Gap, Calculation 10 5 - 18 mmol/L    Glucose 87 70 - 125 mg/dL    BUN 20 8 - 28 mg/dL    Creatinine 0.66 0.60 - 1.10 mg/dL    GFR MDRD Af Amer >60 >60 mL/min/1.73m2    GFR MDRD Non Af Amer >60 >60 mL/min/1.73m2    Bilirubin, Total 0.5 0.0 - 1.0 mg/dL    Calcium 10.3 8.5 - 10.5 mg/dL    Protein, Total 6.8 6.0 - 8.0 g/dL    Albumin 3.9 3.5 - 5.0 g/dL    Alkaline Phosphatase 100 45 - 120 U/L    AST 17 0 - 40 U/L    ALT 17 0 - 45 U/L   CK Total   Result Value Ref Range    CK, Total 63 30 - 190 U/L       I have personally reviewed all pertinent imaging studies and PFT results unless otherwise noted.    Imaging studies:  Xr Injection Major Joint    Result Date: 7/3/2018  1. RIGHT SHOULDER JOINT INJECTION 2. FLUOROSCOPIC GUIDANCE 7/3/2018 11:59 AM INDICATION: Pain. PROCEDURE: Procedure and risks explained and consent received. The skin was prepped and draped in sterile fashion. 5 mL 1% lidocaine infused in local soft tissues. Under direct fluoroscopic guidance, a 22 gauge spinal needle was introduced into  the joint space. Position was confirmed with injection of nonionic contrast material. INJECTION: 1 mL of 80 mg per mL of Depo-Medrol.  4 mL of 0.5% bupivacaine 5 mg/mL . COMPLICATIONS: None. SPECIMEN: None. RADIOLOGIC SUPERVISION AND INTERPRETATION: Fluoroscopy demonstrates intraarticular needle tip position. FLUOROSCOPIC TIME: 1.4 minutes. NUMBER OF IMAGES: 2.     CONCLUSION: Fluoroscopic-guided right shoulder joint injection.    Mr Knee Without Contrast Left    Result Date: 7/3/2018  [facility] MR KNEE WO CONTRAST LEFT 7/3/2018 10:41 AM                                                     INDICATION: Pain in left knee. TECHNIQUE: Unenhanced. COMPARISON: None. FINDINGS: MEDIAL COMPARTMENT: The medial meniscus is intact. Articular cartilage smooth and intact. LATERAL COMPARTMENT: The lateral meniscus is intact. Moderate-sized area of full-thickness articular cartilage loss along the posterior undersurface of the lateral femoral condyle with associated subchondral edema. PATELLOFEMORAL COMPARTMENT: Patella is centrally seated. There is a moderate-sized, complex popliteal cyst with some fluid extending caudally from the cyst margin suggesting recent rupture. Small knee joint effusion without evidence of intra-articular loose bodies. Advanced chondromalacia patella with full-thickness articular cartilage loss involving the medial and lateral patellar facets and median patellar ridge and broad-based areas of deep partial-thickness articular cartilage loss in the lateral portion of the trochlear groove. LIGAMENTS: Cruciate ligaments are intact. Collateral ligaments are intact. POSTEROMEDIAL CORNER: Distal semimembranosus tendon intact. Pes anserine tendons intact. POSTEROLATERAL CORNER: Popliteus tendon intact. Distal biceps tendon intact. EXTENSOR MECHANISM: Extensor tendons are intact. Retinacula are intact. BONES AND SOFT TISSUES: Tiny acute nondisplaced subchondral fracture of the lateral tibial plateau as  seen on series 6 image 12 and series 5 image 18.     CONCLUSION: 1.  Tiny acute subchondral fracture of the lateral tibial plateau. 2.  Advanced chondromalacia patella. 3.  Moderate-sized, complex popliteal cyst with fluid extending caudally from the cyst margin suggesting recent rupture. 4.  Moderate-sized area of full-thickness articular cartilage loss posterior undersurface of the lateral femoral condyle.         Physical Exam:  /80   Pulse 77   SpO2 93%   General - Well nourished  Ears/Mouth -  OP pink moist, no thrush  Neck - no cervical lymphadenopathy  Lungs - Clear to ausculation bilaterally   CVS - regular rhythm with no murmurs, rubs or gallups  Abdomen - soft, NT, ND, NABS  Ext - no cyanosis, clubbing or edema  Skin - no rash  Psychology - alert and oriented, answers appropriate        Electronically signed by:    Demetrius Mckenzie MD  [facility] Pulmonary and Critical Care Medicine

## 2021-06-21 NOTE — PROGRESS NOTES
Assessment and Plan: (Note --the patient was previously seen for  consultation as an inpatient at [facility] by Dr. Weathers on 6/13/2017, please refer to pulmonary consult note from that date for full details).  Veronika Hernandez is a 82 y.o. 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  She presents to the clinic today to transfer her pulmonary care here after her previous [facility] pulmonologist has retired.  She has no respiratory complaints at present, however over the past 10 years since she was diagnosed with ABPA, she has had on average about 1 hospitalization and to outpatient prednisone burst treatments per year for ABPA flares.  After discussing it extensively with the patient and her family in clinic, they are interested in the option of suppressive antifungal therapy with the goal of decreasing her flareups of ABPA requiring prednisone burst. While we do have documentation of an IgE level greater than 1000, the patient and her family do not believe that she has never had Aspergillus skin prick testing and I can find no documentation of any.  Therefore think it would be prudent to first definitively confirm the diagnosis by allergy skin prick testing before starting antifungal therapy.  Occasionally, other environmental colonizing agents can cause allergic reactions and syndromes similar to allergic bronchopulmonary aspergillosis and masquerade as it.  If we do start antifungal therapy, she will need to have her CK as well as liver and kidney function, so we will check a baseline now.  Though she has no respiratory symptoms at present, her PFTs today showed significant obstruction that is not reversible.  Despite this, she has no symptom at present.  She has never smoked herself, however had significant secondhand smoking exposure from her  and mother.    Blood tests ordered for baseline CK, liver  & kidney function, as well as repeat IGE    Allergic clinic referral for aspergillus skin prick test to confirm ABPA diagnosis    Continue Asmanex and albuterol inhaler as needed for now for COPD    Return here in 2 months after allergy clinic testing      CCx: Episodic wheezing and shortness of breath requiring an average of 2 outpatient prednisone burst and 1 hospitalization for IV steroids per year    HPI: Ms. Hernandez is a pleasant lady who was diagnosed with ABPA about 10 years ago.  She denies any previous history of asthma or COPD.  She denies any current chest pain, wheezing, or shortness of breath.  However, she experiences severe wheezing and shortness of breath 2-3 times per year, for which on average she has had to be hospitalized once a year and needed outpatient oral prednisone burst about 2 times per year for the past 10 years.  She has not noted any other modifying factors for the symptoms when they occur.  She denies fevers, chills, or cough.    ROS:  A review of 12 organ systems was performed with pertinent positives and negatives noted in the HPI.      Current Meds:  Current Outpatient Prescriptions   Medication Sig     acetaminophen (TYLENOL) 325 MG tablet Take 2 tablets (650 mg total) by mouth every 6 (six) hours.     acetaminophen-codeine (TYLENOL #3) 300-30 mg per tablet TAKE 1 TABLET BY MOUTH EVERY 6 HOURS AS NEEDED FOR PAIN     albuterol (PROAIR HFA) 90 mcg/actuation inhaler Inhale 2 puffs every 4 (four) hours as needed for wheezing.     albuterol (PROVENTIL) 2.5 mg /3 mL (0.083 %) nebulizer solution INHALE ONE VIAL VIA NEBULIZER EVERY FOUR HOURS AS NEEDED     amLODIPine (NORVASC) 5 MG tablet Take 1 tablet (5 mg total) by mouth daily.     ASMANEX TWISTHALER 220 mcg (30 doses) AePB inhaler INHALE 1 PUFF DAILY.     aspirin 81 MG EC tablet Take 81 mg by mouth daily.     calcium carbonate-vitamin D3 (CALCIUM 600 + D,3,) 600 mg(1,500mg) -400 unit per tablet Take 2 tablets by mouth daily with  supper.     cholecalciferol, vitamin D3, 2,000 unit cap Take 1 capsule by mouth daily.     mometasone (ASMANEX TWISTHALER) 220 mcg (30 doses) AePB inhaler Inhale 1 puff daily.     PAIN RELIEVER 325 mg tablet TAKE 2 TABLETS (650 MG TOTAL) BY MOUTH EVERY 6 (SIX) HOURS.     predniSONE (DELTASONE) 10 mg tablet Take 2 tabs (20 mg ) po q day for 7 days, then take 1 tab(10 mg) po q day for 7 days then stop     predniSONE (DELTASONE) 10 mg tablet Take 1 tablet (10 mg total) by mouth daily.       Labs:  Recent Results (from the past 72 hour(s))   POCT hemoglobin   Result Value Ref Range    Hgb 15.0 7.0 g/dL       I have personally reviewed all pertinent imaging studies and PFT results unless otherwise noted.    Imaging studies:  Xr Injection Major Joint    Result Date: 7/3/2018  1. RIGHT SHOULDER JOINT INJECTION 2. FLUOROSCOPIC GUIDANCE 7/3/2018 11:59 AM INDICATION: Pain. PROCEDURE: Procedure and risks explained and consent received. The skin was prepped and draped in sterile fashion. 5 mL 1% lidocaine infused in local soft tissues. Under direct fluoroscopic guidance, a 22 gauge spinal needle was introduced into the joint space. Position was confirmed with injection of nonionic contrast material. INJECTION: 1 mL of 80 mg per mL of Depo-Medrol.  4 mL of 0.5% bupivacaine 5 mg/mL . COMPLICATIONS: None. SPECIMEN: None. RADIOLOGIC SUPERVISION AND INTERPRETATION: Fluoroscopy demonstrates intraarticular needle tip position. FLUOROSCOPIC TIME: 1.4 minutes. NUMBER OF IMAGES: 2.     CONCLUSION: Fluoroscopic-guided right shoulder joint injection.    Mr Knee Without Contrast Left    Result Date: 7/3/2018  [facility] MR KNEE WO CONTRAST LEFT 7/3/2018 10:41 AM                                                     INDICATION: Pain in left knee. TECHNIQUE: Unenhanced. COMPARISON: None. FINDINGS: MEDIAL COMPARTMENT: The medial meniscus is intact. Articular cartilage smooth and intact. LATERAL COMPARTMENT: The lateral meniscus is  intact. Moderate-sized area of full-thickness articular cartilage loss along the posterior undersurface of the lateral femoral condyle with associated subchondral edema. PATELLOFEMORAL COMPARTMENT: Patella is centrally seated. There is a moderate-sized, complex popliteal cyst with some fluid extending caudally from the cyst margin suggesting recent rupture. Small knee joint effusion without evidence of intra-articular loose bodies. Advanced chondromalacia patella with full-thickness articular cartilage loss involving the medial and lateral patellar facets and median patellar ridge and broad-based areas of deep partial-thickness articular cartilage loss in the lateral portion of the trochlear groove. LIGAMENTS: Cruciate ligaments are intact. Collateral ligaments are intact. POSTEROMEDIAL CORNER: Distal semimembranosus tendon intact. Pes anserine tendons intact. POSTEROLATERAL CORNER: Popliteus tendon intact. Distal biceps tendon intact. EXTENSOR MECHANISM: Extensor tendons are intact. Retinacula are intact. BONES AND SOFT TISSUES: Tiny acute nondisplaced subchondral fracture of the lateral tibial plateau as seen on series 6 image 12 and series 5 image 18.     CONCLUSION: 1.  Tiny acute subchondral fracture of the lateral tibial plateau. 2.  Advanced chondromalacia patella. 3.  Moderate-sized, complex popliteal cyst with fluid extending caudally from the cyst margin suggesting recent rupture. 4.  Moderate-sized area of full-thickness articular cartilage loss posterior undersurface of the lateral femoral condyle.         Physical Exam:  There were no vitals taken for this visit.  General - Well nourished  Ears/Mouth -  OP pink moist, no thrush  Neck - no cervical lymphadenopathy  Lungs - Clear to ausculation bilaterally   CVS - regular rhythm with no murmurs, rubs or gallups  Abdomen - soft, NT, ND, NABS  Ext - no cyanosis, clubbing or edema  Skin - no rash  Psychology - alert and oriented, answers  appropriate        Electronically signed by:    Demetrius Mckenzie MD  [facility] Pulmonary and Critical Care Medicine

## 2021-06-21 NOTE — PROGRESS NOTES
CD of images received from [facility]    Images on CD is CT chest completed on 10/25/17    CD sent to radiology to be upload into [facility].

## 2021-06-23 NOTE — PATIENT INSTRUCTIONS - HE
Let us know if you want to start itraconazole to attempt to decrease flare-ups of ABPA. If so:   - follow-up in our clinic in 1-2 months with repeat bloods soon before

## 2021-06-23 NOTE — TELEPHONE ENCOUNTER
Call from patient's daughter, Nora. States family has made decision to move forward with the antifungal therapy mentioned by Dr. Mckenzie at Ms. Hernandez's last office visit on 1/25/19.    Daughter's phone number is [phone].

## 2021-06-23 NOTE — TELEPHONE ENCOUNTER
Have been unable to reach patient's daughter Kelly at the phone number listed. Did leave a detailed VM message with Dr. Mckenzie's recommendations:  Thank you for letting me know.  I tried calling her daughter back at that number but just received an unidentified voicemail.  If you are able, please contact and let them know that I ordered an initial 1 month supply of itraconazole.  She should have labs rechecked and follow-up with me in clinic at that time.  I will order further doses then if everything looks okay.     Asked that she please call back to let me know that she received my message or for any further questions.

## 2021-06-23 NOTE — PROGRESS NOTES
Assessment:    Patient with a history of allergic bronchopulmonary aspergillosis diagnosis.  Here for allergy testing for Aspergillus sensitivity.  Skin prick testing was nonconclusive.  She has very thin skin and reading of skin testing is difficult.  Will obtain serum specific IgE to aspergillosis.    Plan:    Blood specific IgE for aspergillosis.  Follow-up with pulmonary.  ____________________________________________________________________________     Veronika comes in today for allergy testing.  She has had a long history of allergic bronchopulmonary aspergillosis is 2010.  Recently she was seen by a new pulmonologist.  They were unable to find any evidence of recent allergy testing to aspergillosis.  She is here today to complete that.    Review of symptoms:  As above, otherwise negative    Past medical history: Allergic bronchopulmonary aspergillosis, COPD, hypertension, hyperparathyroidism, asthma, osteoporosis, traumatic brain injury.    Allergies: No known allergies to latex, foods or hymenoptera venom.  History of allergy to azithromycin, cefdinir, cephalexin, erythromycin and moxifloxacin.    Family history: Multiple members of the family with allergy and asthma.    Social history: Currently is lived in the same house for 3 years.  There is no basement.  No visible water seepage or mold.  No pets in the home.  No cigarette smoking history.    Medications: reviewed in chart    Physical Exam:  General:  Alert and in no apparent distress.  Eyes:  Sclera clear.  Ears: TMs translucent grey with bony landmarks visible. Nose: Pale, boggy mucosal membranes.  Throat: Pink, moist.  No lesions.  Neck: Supple.  No lymphadenopathy.  Lungs: CTA.  CV: Regular rate and rhythm. Extremities: Well perfused.  No clubbing or cyanosis. Skin: No rash    Last Percutaneous Allergy Test Results  Molds/Fungi  Aspergillus Fumigatus 1:10 H (W/F in mm): 7/25 (01/17/19 1344)  Controls  Device Type: QUINTIP (01/17/19 1344)  Neg. control:  50% Glycerine/Saline H (W/F in mm): 0-0 (01/17/19 1344)  Pos. control: Histamine 6mg/ML (W/F in mms): 7/35 (01/17/19 1344)

## 2021-06-23 NOTE — TELEPHONE ENCOUNTER
[place] back from patient's daughter. She will have her mom start the medication and have labs drawn 1 month after starting. Patient has f/u visit with Dr. Mckenzie on 4/16.

## 2021-06-23 NOTE — PROGRESS NOTES
Assessment and Plan:  Veronika Hernandez is a 82 y.o. 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  She presented to our clinic Nov 2018 to transfer her pulmonary care here after her previous [facility] pulmonologist has retired.  Over the past 10 years since she was diagnosed with ABPA, she has had on average about 1 hospitalization and 3-4 outpatient prednisone burst treatments per year for ABPA flares.      Her ABPA diagnosis was confirmed with a repeat total IgE level greater than 1000, and an elevated Aspergillus specific IgE.  Allergy clinic could not perform skin prick testing due to the patient's thin skin. Baseline CK, liver & kidney function tests were normal.  I discussed the pros and cons of trying antifungal therapy to decrease the number of flareups of ABPA extensively with the patient and her daughter.  I informed them that while this treatment is usually recommended for severe cases of ABPA in patients who cannot get off of prednisone, there is no consensus regarding its use to decrease in number of flareups in people who are asymptomatic and do not need prednisone between attacks.  I reviewed the potential side effects of itraconazole with them, including potentially life-threatening liver and kidney toxicity.  They verbalized their understanding and stated that they will discuss it amongst themselves and other family members and get back to us.     1) If they do decide to proceed with antifungal therapy:  -Would start itraconazole 100 mg p.o. solution twice daily (lower than usual dose due to the patient's low weight, based on 2.5 mg/kg)  -We would recheck IgE, CK, and CMP initially 1 month after starting therapy, possibly with longer intervals thereafter if she tolerates it well  -Labs and follow-up appointment have been ordered in case they decide to proceed with therapy, however the patient and her  "daughter understand that they do not have to fill them should they decide not to try therapy.  -Standing orders for prednisone tapers and Tessalon Perles for cough ordered to be used as needed if the patient has recurrent ABPA flares     2) Continue Asmanex and albuterol inhaler as needed for now for COPD    2/5/2019 ADDENDUM - I received the following staff message today:  \"Call from patient's daughter, Nora. States family has made decision to move forward with the antifungal therapy mentioned by Dr. Mckenzie at Ms. Hernandez's last office visit on 1/25/19.  Daughter's phone number is [phone].\"    I attempted to call the patient's daughter back at that number, however just received an unidentified voicemail.  I will order a month of initial itraconazole therapy, with repeat labs and clinic follow-up visit at that time.  I will request that our clinic staff attempt to contact the patient's daughter to inform her.    CCx: Repeated ABPA flareup    HPI: Patient currently denies shortness of breath or other she did however have a flareup of her ABPA/reactive airways about 6 weeks ago requiring prednisone burst.  They are currently out of cough suppressants and prednisone refills and are requesting them.    ROS:  A review of 12 organ systems was performed with pertinent positives and negatives noted in the HPI.      Current Meds:  Current Outpatient Medications   Medication Sig     acetaminophen (TYLENOL) 325 MG tablet Take 2 tablets (650 mg total) by mouth every 6 (six) hours.     acetaminophen-codeine (TYLENOL #3) 300-30 mg per tablet TAKE 1 TABLET BY MOUTH EVERY 6 HOURS AS NEEDED FOR PAIN     albuterol (PROAIR HFA) 90 mcg/actuation inhaler Inhale 2 puffs every 4 (four) hours as needed for wheezing.     albuterol (PROVENTIL) 2.5 mg /3 mL (0.083 %) nebulizer solution INHALE ONE VIAL VIA NEBULIZER EVERY FOUR HOURS AS NEEDED     amLODIPine (NORVASC) 5 MG tablet Take 1 tablet (5 mg total) by mouth daily.     ASMANEX " TWISTHALER 220 mcg (30 doses) AePB inhaler INHALE 1 PUFF DAILY.     aspirin 81 MG EC tablet Take 81 mg by mouth daily.     calcium carbonate-vitamin D3 (CALCIUM 600 + D,3,) 600 mg(1,500mg) -400 unit per tablet Take 2 tablets by mouth daily with supper.     cholecalciferol, vitamin D3, 2,000 unit cap Take 1 capsule by mouth daily.     mometasone (ASMANEX TWISTHALER) 220 mcg (30 doses) AePB inhaler Inhale 1 puff daily.     PAIN RELIEVER 325 mg tablet TAKE 2 TABLETS (650 MG TOTAL) BY MOUTH EVERY 6 (SIX) HOURS.     predniSONE (DELTASONE) 10 mg tablet Take 2 tabs (20 mg ) po q day for 7 days, then take 1 tab(10 mg) po q day for 7 days then stop.     benzonatate (TESSALON PERLES) 100 MG capsule Take 1 capsule (100 mg total) by mouth 3 (three) times a day as needed for cough.       Labs:  No results found for this or any previous visit (from the past 72 hour(s)).    I have personally reviewed all pertinent imaging studies and PFT results unless otherwise noted.    Imaging studies:  Xr Injection Major Joint    Result Date: 7/3/2018  1. RIGHT SHOULDER JOINT INJECTION 2. FLUOROSCOPIC GUIDANCE 7/3/2018 11:59 AM INDICATION: Pain. PROCEDURE: Procedure and risks explained and consent received. The skin was prepped and draped in sterile fashion. 5 mL 1% lidocaine infused in local soft tissues. Under direct fluoroscopic guidance, a 22 gauge spinal needle was introduced into the joint space. Position was confirmed with injection of nonionic contrast material. INJECTION: 1 mL of 80 mg per mL of Depo-Medrol.  4 mL of 0.5% bupivacaine 5 mg/mL . COMPLICATIONS: None. SPECIMEN: None. RADIOLOGIC SUPERVISION AND INTERPRETATION: Fluoroscopy demonstrates intraarticular needle tip position. FLUOROSCOPIC TIME: 1.4 minutes. NUMBER OF IMAGES: 2.     CONCLUSION: Fluoroscopic-guided right shoulder joint injection.    Mr Knee Without Contrast Left    Result Date: 7/3/2018  [facility] MR KNEE WO CONTRAST LEFT 7/3/2018 10:41 AM                                                      INDICATION: Pain in left knee. TECHNIQUE: Unenhanced. COMPARISON: None. FINDINGS: MEDIAL COMPARTMENT: The medial meniscus is intact. Articular cartilage smooth and intact. LATERAL COMPARTMENT: The lateral meniscus is intact. Moderate-sized area of full-thickness articular cartilage loss along the posterior undersurface of the lateral femoral condyle with associated subchondral edema. PATELLOFEMORAL COMPARTMENT: Patella is centrally seated. There is a moderate-sized, complex popliteal cyst with some fluid extending caudally from the cyst margin suggesting recent rupture. Small knee joint effusion without evidence of intra-articular loose bodies. Advanced chondromalacia patella with full-thickness articular cartilage loss involving the medial and lateral patellar facets and median patellar ridge and broad-based areas of deep partial-thickness articular cartilage loss in the lateral portion of the trochlear groove. LIGAMENTS: Cruciate ligaments are intact. Collateral ligaments are intact. POSTEROMEDIAL CORNER: Distal semimembranosus tendon intact. Pes anserine tendons intact. POSTEROLATERAL CORNER: Popliteus tendon intact. Distal biceps tendon intact. EXTENSOR MECHANISM: Extensor tendons are intact. Retinacula are intact. BONES AND SOFT TISSUES: Tiny acute nondisplaced subchondral fracture of the lateral tibial plateau as seen on series 6 image 12 and series 5 image 18.     CONCLUSION: 1.  Tiny acute subchondral fracture of the lateral tibial plateau. 2.  Advanced chondromalacia patella. 3.  Moderate-sized, complex popliteal cyst with fluid extending caudally from the cyst margin suggesting recent rupture. 4.  Moderate-sized area of full-thickness articular cartilage loss posterior undersurface of the lateral femoral condyle.         Physical Exam:  /76   Pulse 76   Resp 24   SpO2 93% Comment: RA  General - Well nourished  Ears/Mouth -  OP pink moist, no thrush  Neck - no  cervical lymphadenopathy  Lungs - Clear to ausculation bilaterally   CVS - regular rhythm with no murmurs, rubs or gallups  Abdomen - soft, NT, ND, NABS  Ext - no cyanosis, clubbing or edema  Skin - no rash  Psychology - alert and oriented, answers appropriate        Electronically signed by:    Demetrius Mckenzie MD  [facility] Pulmonary and Critical Care Medicine

## 2021-06-24 NOTE — TELEPHONE ENCOUNTER
Patient's daughter Kelly is calling because they have not yet heard from [facility] about an evaluation for a powered wheelchair. It was noted during the phone call that [facility] is not covered by the patient's insurance, as noted below. Kelly will contact [facility] to determine what agency is preferred through her plan. She will call back with this information to request a new referral.

## 2021-06-24 NOTE — TELEPHONE ENCOUNTER
Refill Approved    Rx renewed per Medication Renewal Policy. Medication was last renewed on 12/3/18.    Mica Taveras, [facility] Triage/Med Refill 2/12/2019     Requested Prescriptions   Pending Prescriptions Disp Refills     albuterol (PROVENTIL) 2.5 mg /3 mL (0.083 %) nebulizer solution [Pharmacy Med Name: ALBUTEROL SUL 2.5 MG/3 ML SOLN] 75 mL 2     Sig: INHALE ONE VIAL VIA NEBULIZER EVERY FOUR HOURS AS NEEDED    Albuterol/Levalbuterol Refill Protocol Passed - 2/12/2019  1:42 PM       Passed - PCP or prescribing provider visit in last year    Last office visit with prescriber/PCP: 6/11/2018 Yuli Mata MD OR same dept: 6/11/2018 Yuli Mata MD OR same specialty: 6/11/2018 Yuli Mata MD Last physical: Visit date not found       Next appt within 3 mo: Visit date not found  Next physical within 3 mo: Visit date not found  Prescriber OR PCP: Yuli Mata MD  Last diagnosis associated with med order: 1. Asthma, chronic, moderate persistent, uncomplicated  - albuterol (PROVENTIL) 2.5 mg /3 mL (0.083 %) nebulizer solution [Pharmacy Med Name: ALBUTEROL SUL 2.5 MG/3 ML SOLN]; INHALE ONE VIAL VIA NEBULIZER EVERY FOUR HOURS AS NEEDED  Dispense: 75 mL; Refill: 2    If protocol passes may refill for 6 months if within 3 months of last provider visit (or a total of 9 months). If patient requesting >1 inhaler per month refill x 6 months and have patient make appointment with provider.

## 2021-06-24 NOTE — TELEPHONE ENCOUNTER
Prior Authorization Request  Who s requesting:  [facility]  Pharmacy Name and Location: [facility] [place]  Medication Name: itraconazole 100mg oral capsules  Insurance Plan: [facility]  Insurance Member ID Number:  [id number]  Informed patient that prior authorizations can take up to 10 business days for response:   No  Okay to leave a detailed message: No    Patient was given a 30 day supply, but [facility] stated PA needed.  Dr. Mckenzie would like patient on this drug indefinately for Allergic Bronchopulmonary aspergillosis.

## 2021-06-24 NOTE — PROGRESS NOTES
Family Medicine Office Visit  [facility]  Patient Name: Veronika Hernandez  Patient Age: 83 y.o.  YOB: 1935  MRN: [id number]    Date of Visit: 2019  Reason for Office Visit:   Chief Complaint   Patient presents with     Cough     x 6 days. Has COPD. Has been having wheezing and coughing. Just saw  Pulmonologist this month. Did cough up phlegm yesterday and now feels better. Using nebulizer           Assessment / Plan / Medical Decision Makin. ABPA (allergic bronchopulmonary aspergillosis) (H)  Start medication and recommend follow up with pulmonology    2. Cough  Improved and continue with inhalers    3. Traumatic brain injury, without loss of consciousness, subsequent encounter  - Ambulatory referral to Home Health    4. Abnormal gait  - Ambulatory referral to Home Health  Will do referral and see if they can help with the assessment for motorized wheelchair in addition to try and help with strength training        Health Maintenance Review  Health Maintenance   Topic Date Due     ASTHMA FOLLOW-UP  1935     ZOSTER VACCINES (2 of 3) 2008     FALL RISK ASSESSMENT  2019     DXA SCAN  2019     ASTHMA CONTROL TEST  2019     ADVANCE DIRECTIVES DISCUSSED WITH PATIENT  2023     TD 18+ HE  2028     PNEUMOCOCCAL POLYSACCHARIDE VACCINE AGE 65 AND OVER  Completed     INFLUENZA VACCINE RULE BASED  Completed     PNEUMOCOCCAL CONJUGATE VACCINE FOR ADULTS (PCV13 OR PREVNAR)  Completed         I am having Veronika Hernandez maintain her cholecalciferol (vitamin D3), calcium carbonate-vitamin D3, aspirin, albuterol, acetaminophen, PAIN RELIEVER, amLODIPine, mometasone, acetaminophen-codeine, ASMANEX TWISTHALER, benzonatate, predniSONE, itraconazole, and albuterol.      HPI:  Veronika Hernandez is a 83 y.o. year old who presents to the office today for nebulizer and wheezing since yesterday.  Coughing lots yesterday but coughed up big  mucus plug.  Yesterday day 7 of steroid as per pulmonology for any flair ups.  After coughing the mucus plug last night signifcant improvement.  Breathing much improved.  Plan to start medication itraconazole for ABPA and then need to recheck labs in 1 month.  Started that yesterday as per pulmonology.    Walking but diminished.  Bone on bone in the shoulder and then leg weakness.  Problems with gait due to hx of traumatic brain injury.  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   currently pushing her around and difficulty with that due to his age.    Review of Systems- pertinent positive in bold:  Constitutional: Fever, chills, night sweats, fainting, weight change, fatigue, seizures, dizziness, sleeping difficulties, loud snoring/pauses in breathing  Eyes: change in vision, blurred or double vision, redness/eye pain  Ears, nose, mouth, throat: change in hearing, ear pain, hoarseness, difficulty swallowing, sores in the mouth or throat  Respiratory: shortness of breath, cough, bloody sputum, wheezing  Cardiovascular: chest pain, palpitations   Gastrointestinal: abdominal pain, heartburn/indigestion, nausea/vomiting, change in appetite, change in bowel habits, constipation or diarrhea, rectal bleeding/dark stools, difficulty swallowing  Urinary: painful urination, frequent urination, urinary urgency/incontinence, blood in urine/dark urine, nocturia  Musculoskeletal: backache/back pain (new or increasing), weakness, joint pain/stiffness (new or increasing), muscle cramps, swelling of hands, feet, ankles, leg pain/redness  Skin: change in moles/freckles, rash, nodules  Hematologic/lymphatic: swollen lymph glands, abnormal bruising/bleeding  Endocrine: excessive thirst/urination, cold or heat intolerance  Neurologic/emotional: worrisome memory change, numbness/tingling, anxiety, mood swings      Current Scheduled  Meds:  Outpatient Encounter Medications as of 2/27/2019   Medication Sig Dispense Refill     acetaminophen (TYLENOL) 325 MG tablet Take 2 tablets (650 mg total) by mouth every 6 (six) hours. 100 tablet 0     acetaminophen-codeine (TYLENOL #3) 300-30 mg per tablet TAKE 1 TABLET BY MOUTH EVERY 6 HOURS AS NEEDED FOR PAIN 30 tablet 0     albuterol (PROAIR HFA) 90 mcg/actuation inhaler Inhale 2 puffs every 4 (four) hours as needed for wheezing. 4 Inhaler 0     albuterol (PROVENTIL) 2.5 mg /3 mL (0.083 %) nebulizer solution INHALE ONE VIAL VIA NEBULIZER EVERY FOUR HOURS AS NEEDED 225 mL 2     amLODIPine (NORVASC) 5 MG tablet Take 1 tablet (5 mg total) by mouth daily. 90 tablet 4     ASMANEX TWISTHALER 220 mcg (30 doses) AePB inhaler INHALE 1 PUFF DAILY. 1 each 12     aspirin 81 MG EC tablet Take 81 mg by mouth daily.       benzonatate (TESSALON PERLES) 100 MG capsule Take 1 capsule (100 mg total) by mouth 3 (three) times a day as needed for cough. 30 capsule 2     calcium carbonate-vitamin D3 (CALCIUM 600 + D,3,) 600 mg(1,500mg) -400 unit per tablet Take 2 tablets by mouth daily with supper.       cholecalciferol, vitamin D3, 2,000 unit cap Take 1 capsule by mouth daily.       itraconazole (SPORANOX) 100 mg capsule Take 1 capsule (100 mg total) by mouth 2 (two) times a day. 60 capsule 0     mometasone (ASMANEX TWISTHALER) 220 mcg (30 doses) AePB inhaler Inhale 1 puff daily. 3 each 3     predniSONE (DELTASONE) 10 mg tablet Take 2 tabs (20 mg ) po q day for 7 days, then take 1 tab(10 mg) po q day for 7 days then stop. 21 tablet 3     PAIN RELIEVER 325 mg tablet TAKE 2 TABLETS (650 MG TOTAL) BY MOUTH EVERY 6 (SIX) HOURS. 100 tablet 3     No facility-administered encounter medications on file as of 2/27/2019.      Past Medical History:   Diagnosis Date     Asthma      Past Surgical History:   Procedure Laterality Date     VA HEMORRHOIDECTOMY INTERNAL RUBBER BAND LIGATIONS      Description: Hemorrhoidectomy;  Proc Date:  07/01/2008;     SC LAP, VENTRAL HERNIA REPAIR,REDUCIBLE      Description: Laparoscopy Repair Of Umbilical Hernia;  Recorded: 06/10/2010;     SC LAP,CHOLECYSTECTOMY      Description: Cholecystectomy Laparoscopic;  Recorded: 06/10/2010;     SC PERC VERTEB AUGMENT/ KYPHOPLAST, THOR      Description: Percutaneous Vertebral Augmentation Thoracic;  Proc Date: 04/01/2009;     Social History     Tobacco Use     Smoking status: Never Smoker     Smokeless tobacco: Never Used   Substance Use Topics     Alcohol use: Yes     Comment: 1 drink a day     Drug use: No       Objective / Physical Examination:  Vitals:    02/27/19 0858   BP: 124/70   Pulse: 76   Resp: 16   SpO2: 90%   Height: 5' (1.524 m)     Wt Readings from Last 3 Encounters:   01/17/19 (!) 95 lb (43.1 kg)   11/02/18 (!) 90 lb (40.8 kg)   03/20/18 (!) 92 lb (41.7 kg)     BP Readings from Last 3 Encounters:   02/27/19 124/70   01/25/19 134/76   11/02/18 132/82     Body mass index is 18.55 kg/m .   Results for orders placed or performed in visit on 01/17/19   CK Total   Result Value Ref Range    CK, Total 56 30 - 190 U/L   Comprehensive Metabolic Panel   Result Value Ref Range    Sodium 140 136 - 145 mmol/L    Potassium 4.2 3.5 - 5.0 mmol/L    Chloride 99 98 - 107 mmol/L    CO2 27 22 - 31 mmol/L    Anion Gap, Calculation 14 5 - 18 mmol/L    Glucose 85 70 - 125 mg/dL    BUN 20 8 - 28 mg/dL    Creatinine 0.60 0.60 - 1.10 mg/dL    GFR MDRD Af Amer >60 >60 mL/min/1.73m2    GFR MDRD Non Af Amer >60 >60 mL/min/1.73m2    Bilirubin, Total 0.3 0.0 - 1.0 mg/dL    Calcium 10.5 8.5 - 10.5 mg/dL    Protein, Total 6.8 6.0 - 8.0 g/dL    Albumin 3.9 3.5 - 5.0 g/dL    Alkaline Phosphatase 101 45 - 120 U/L    AST 17 0 - 40 U/L    ALT 10 0 - 45 U/L   Immunoglobulin E   Result Value Ref Range    Immunoglobulin E 1,690.00 (H) 0.00 - 100.00 kU/L           General Appearance: Alert and oriented, cooperative, affect appropriate, speech clear, in no apparent distress  Head: Normocephalic,  atraumatic  Ears: Tympanic membrane clear with landmarks well visualized bilaterally  Eyes: PERRL, fundi appear clear bilaterally. EOMI. Conjunctivae clear and sclerae non-icteric  Nose: Septum midline, nares patent, no visible polyps, mucosa moist and without drainage  Throat: Lips and mucosa moist. Teeth in good repair, pharynx without erythema or exudate  Neck: Supple, trachea midline. No cervical adenopathy  Back: Symmetrical and nontender  Lungs: Clear to auscultation bilaterally. Normal inspiratory and expiratory effort  Cardiovascular: Regular rate, normal S1, S2. No murmurs, rubs, or gallops  Abdomen: Bowel sounds active all four quadrants. Soft, non-tender. No hepatomegaly or splenomegaly. No bruits detected.   Integumentary: Warm and dry. Without suspicious looking lesions  Neuro: Alert and oriented, follows commands appropriately.     Orders Placed This Encounter   Procedures     Ambulatory referral to Home Health   Followup: No Follow-up on file. earlier if needed.    Total time spent with patient was 30 min with >50% of time spent in face-to-face counseling regarding the above plan       Yuli Mata MD

## 2021-06-24 NOTE — TELEPHONE ENCOUNTER
Hello,  We received a referral for [facility] care to see this patient. The patient has [employer] insurance and we are not contracted with this plan. Unfortunately, [facility] and [facility] will be unable to accept this referral.  Charlene

## 2021-06-25 NOTE — TELEPHONE ENCOUNTER
"Spoke w/ [facility] and got a few \"starting options\" for other home care agencies since patients insurance has been less than helpful with providing information.  Now that I have a number of facilities to start with, I have left message with Kelly to discuss various options and establish a plan of attack on how she would like to proceed.  Left my direct dial on  for her to me back.   "

## 2021-06-25 NOTE — TELEPHONE ENCOUNTER
I have placed another order for home health through an external source and I will reach out to scheduling to see where we are in the status

## 2021-06-25 NOTE — TELEPHONE ENCOUNTER
LVM for Kelly that this is being worked on and a new referral was placed.      Please note that if pt calls back  - need to know if she has a name of a company that they can get HHC through with insurance.  Please assist specialty sched to gather that info.

## 2021-06-25 NOTE — TELEPHONE ENCOUNTER
Spoke w/ Kelly in detail.  Explained some of the barriers that we have been experiencing and she covered some of the barriers she's run into as well.  Explained to her that I have a list of a 5 places that I was able to obtain earlier this morning in regards to home health facilities that we can check with.  After making some calls to those facilities it's been determined that [facility] can provide all the services we're looking for and does work with patient insurance.  Kelly explained that she would like to call the ins company to verify coverage with [facility] and I encouraged her to do so.  She stated that she's meeting with her parents tomorrow and will discuss with them as well as make a few phone calls to the ins company.  She will call me back tomorrow and if needed leave me a VM with the info.  I told her that's a great plan, I will hold on doing anything else until I hear from her and once I do we can move forward in whichever direction we need to.  She agreed with that plan and understands I'm waiting for her to call me back before moving forward.

## 2021-06-25 NOTE — TELEPHONE ENCOUNTER
Who is calling:  Daughter  Reason for Call:  Calling requesting further information for the power wheelchair for home care to come out and do an evaluation. Insurance does not cover HE. Please update daughter as she is getting frustrated this is taking > 3 weeks.   Date of last appointment with primary care: 2/27/19  Okay to leave a detailed message: Yes

## 2021-06-25 NOTE — TELEPHONE ENCOUNTER
Patient Returning Call  Reason for call:  LMTCB  Information relayed to patient:  Let daughter know below information and asked if she knew of where she can go for providers for home care.    She stated she contact  and they told her that the provider needs to log on to the portal and obtain who the patient can see.    Patients daughter is beyond frustrated. She left Jorge a message 3 days ago and hasn't heard anything and the order is almost a month old and patient will soon need a wheelchair.    Attempted to get specialty  on the phone but was unsuccessful.    Daughter would like someone to contact her back ASAP on this to discuss what the next steps are and how to get her mother home care.  Patient has additional questions:  Yes  If YES, what are your questions/concerns:  See above.  Okay to leave a detailed message?: Yes - call daughter Kelly [phone]

## 2021-06-25 NOTE — TELEPHONE ENCOUNTER
"Called pt's daughter Kelly and left message.  Already placed home health referral yesterday.  I will touch base with Jorge tomorrow.  I have now placed an internal referral and an external referral for home health and for wheelchair assessment so we will continue to work to see what we can get the patient.      Spoke with Kelly.  States that they called health partners to find out who would be \"in network\" for home health and the company would not give them a list of people or companies that do it and told them that the clinic needs to request that information on a portal.  Family is just very concerned and confused.  Would like home health no matter what, but also will need wheelchair assessment.  Suggest getting home health in immediately and then from there if we need to find alternative company to provide assessment then we can.  Can you call them Jorge first thing to update them with plan and possible new information?  "

## 2021-06-27 ENCOUNTER — HEALTH MAINTENANCE LETTER (OUTPATIENT)
Age: 86
End: 2021-06-27

## 2021-07-03 NOTE — ADDENDUM NOTE
Addendum Note by Yuli Mata MD at 3/20/2019  5:09 PM     Author: Yuli Mata MD Service: -- Author Type: Physician    Filed: 3/20/2019  5:09 PM Encounter Date: 2/27/2019 Status: Signed    : Yuli Mata MD (Physician)    Addended by: YULI MATA on: 3/20/2019 05:09 PM        Modules accepted: Orders

## 2021-07-03 NOTE — ADDENDUM NOTE
Addendum Note by Nima Mckenzie MD at 1/25/2019  2:30 PM     Author: Nima Mckenzie MD Service: -- Author Type: Physician    Filed: 2/5/2019  4:35 PM Encounter Date: 1/25/2019 Status: Signed    : Nima Mckenzie MD (Physician)    Addended by: DARIAN MCKENZIE on: 2/5/2019 04:35 PM        Modules accepted: Orders

## 2021-07-03 NOTE — ADDENDUM NOTE
Addendum Note by Grabiel Myers CMA at 3/20/2019  4:41 PM     Author: Grabiel Myers CMA Service: -- Author Type: Certified Medical Assistant    Filed: 3/20/2019  4:41 PM Encounter Date: 2/27/2019 Status: Signed    : Grabiel Myers CMA (Certified Medical Assistant)    Addended by: GRABIEL MYERS on: 3/20/2019 04:41 PM        Modules accepted: Orders

## 2021-07-13 ENCOUNTER — RECORDS - HEALTHEAST (OUTPATIENT)
Dept: ADMINISTRATIVE | Facility: CLINIC | Age: 86
End: 2021-07-13

## 2021-07-14 PROBLEM — J45.901 ASTHMA EXACERBATION: Status: RESOLVED | Noted: 2017-06-13 | Resolved: 2019-03-26

## 2021-07-21 ENCOUNTER — RECORDS - HEALTHEAST (OUTPATIENT)
Dept: ADMINISTRATIVE | Facility: CLINIC | Age: 86
End: 2021-07-21

## 2021-10-17 ENCOUNTER — HEALTH MAINTENANCE LETTER (OUTPATIENT)
Age: 86
End: 2021-10-17

## 2022-02-16 ENCOUNTER — TRANSFERRED RECORDS (OUTPATIENT)
Dept: HEALTH INFORMATION MANAGEMENT | Facility: CLINIC | Age: 87
End: 2022-02-16

## 2022-07-23 ENCOUNTER — HEALTH MAINTENANCE LETTER (OUTPATIENT)
Age: 87
End: 2022-07-23

## 2022-10-01 ENCOUNTER — HEALTH MAINTENANCE LETTER (OUTPATIENT)
Age: 87
End: 2022-10-01

## 2023-03-24 NOTE — TELEPHONE ENCOUNTER
[facility] Ding called back said they went back today and bjorn labs but then got to the lab and was told it was drawn in the wrong tube. They will send a nurse out again tomorrow to draw blood work. Spoke w/ [facility].  They do work with patient insurance, though amount of coverage would want to be verified by patient with insurance company.  They will cover within patient service area and can/will do PT/OT AND wheelchair assessments all in home.

## 2023-05-21 ENCOUNTER — APPOINTMENT (OUTPATIENT)
Dept: RADIOLOGY | Facility: HOSPITAL | Age: 88
DRG: 177 | End: 2023-05-21
Attending: EMERGENCY MEDICINE
Payer: COMMERCIAL

## 2023-05-21 ENCOUNTER — HOSPITAL ENCOUNTER (INPATIENT)
Facility: HOSPITAL | Age: 88
LOS: 6 days | Discharge: HOME | DRG: 177 | End: 2023-05-27
Attending: EMERGENCY MEDICINE | Admitting: HOSPITALIST
Payer: COMMERCIAL

## 2023-05-21 ENCOUNTER — APPOINTMENT (OUTPATIENT)
Dept: CT IMAGING | Facility: HOSPITAL | Age: 88
DRG: 177 | End: 2023-05-21
Attending: HOSPITALIST
Payer: COMMERCIAL

## 2023-05-21 DIAGNOSIS — J96.01 ACUTE RESPIRATORY FAILURE WITH HYPOXIA (H): ICD-10-CM

## 2023-05-21 DIAGNOSIS — F19.982 DRUG-INDUCED INSOMNIA (H): ICD-10-CM

## 2023-05-21 DIAGNOSIS — R06.02 SHORTNESS OF BREATH: ICD-10-CM

## 2023-05-21 DIAGNOSIS — J69.0 ASPIRATION PNEUMONITIS (H): Primary | ICD-10-CM

## 2023-05-21 DIAGNOSIS — J18.9 PNEUMONIA DUE TO INFECTIOUS ORGANISM, UNSPECIFIED LATERALITY, UNSPECIFIED PART OF LUNG: ICD-10-CM

## 2023-05-21 DIAGNOSIS — I50.9 ACUTE ON CHRONIC CONGESTIVE HEART FAILURE, UNSPECIFIED HEART FAILURE TYPE (H): ICD-10-CM

## 2023-05-21 DIAGNOSIS — J44.1 COPD EXACERBATION (H): ICD-10-CM

## 2023-05-21 LAB
ANION GAP SERPL CALCULATED.3IONS-SCNC: 14 MMOL/L (ref 7–15)
BASE EXCESS BLDA CALC-SCNC: 4 MMOL/L
BASOPHILS # BLD MANUAL: 0 10E3/UL (ref 0–0.2)
BASOPHILS NFR BLD MANUAL: 0 %
BUN SERPL-MCNC: 23.4 MG/DL (ref 8–23)
CALCIUM SERPL-MCNC: 9.3 MG/DL (ref 8.8–10.2)
CHLORIDE SERPL-SCNC: 97 MMOL/L (ref 98–107)
COHGB MFR BLD: 96.7 % (ref 95–96)
CREAT SERPL-MCNC: 0.51 MG/DL (ref 0.51–0.95)
DEPRECATED HCO3 PLAS-SCNC: 24 MMOL/L (ref 22–29)
EOSINOPHIL # BLD MANUAL: 0 10E3/UL (ref 0–0.7)
EOSINOPHIL NFR BLD MANUAL: 0 %
ERYTHROCYTE [DISTWIDTH] IN BLOOD BY AUTOMATED COUNT: 12.5 % (ref 10–15)
FLUAV RNA SPEC QL NAA+PROBE: NEGATIVE
FLUBV RNA RESP QL NAA+PROBE: NEGATIVE
GFR SERPL CREATININE-BSD FRML MDRD: 90 ML/MIN/1.73M2
GLUCOSE SERPL-MCNC: 146 MG/DL (ref 70–99)
HCO3 BLD-SCNC: 29 MMOL/L (ref 23–29)
HCT VFR BLD AUTO: 39.1 % (ref 35–47)
HGB BLD-MCNC: 12.5 G/DL (ref 11.7–15.7)
LACTATE SERPL-SCNC: 2.5 MMOL/L (ref 0.7–2)
LYMPHOCYTES # BLD MANUAL: 0.2 10E3/UL (ref 0.8–5.3)
LYMPHOCYTES NFR BLD MANUAL: 2 %
MAGNESIUM SERPL-MCNC: 2 MG/DL (ref 1.7–2.3)
MCH RBC QN AUTO: 28.7 PG (ref 26.5–33)
MCHC RBC AUTO-ENTMCNC: 32 G/DL (ref 31.5–36.5)
MCV RBC AUTO: 90 FL (ref 78–100)
METAMYELOCYTES # BLD MANUAL: 0.1 10E3/UL
METAMYELOCYTES NFR BLD MANUAL: 1 %
MONOCYTES # BLD MANUAL: 1.1 10E3/UL (ref 0–1.3)
MONOCYTES NFR BLD MANUAL: 10 %
NEUTROPHILS # BLD MANUAL: 9.6 10E3/UL (ref 1.6–8.3)
NEUTROPHILS NFR BLD MANUAL: 87 %
NT-PROBNP SERPL-MCNC: 3254 PG/ML (ref 0–1800)
OXYHGB MFR BLD: 95.3 % (ref 95–96)
PCO2 BLD: 46 MM HG (ref 35–45)
PH BLD: 7.41 [PH] (ref 7.37–7.44)
PLAT MORPH BLD: ABNORMAL
PLATELET # BLD AUTO: 362 10E3/UL (ref 150–450)
PO2 BLD: 78 MM HG (ref 75–85)
POTASSIUM SERPL-SCNC: 3.9 MMOL/L (ref 3.4–5.3)
RBC # BLD AUTO: 4.35 10E6/UL (ref 3.8–5.2)
RBC MORPH BLD: ABNORMAL
RSV RNA SPEC NAA+PROBE: NEGATIVE
SARS-COV-2 RNA RESP QL NAA+PROBE: NEGATIVE
SODIUM SERPL-SCNC: 135 MMOL/L (ref 136–145)
TEMPERATURE: 37 DEGREES C
TOXIC GRANULES BLD QL SMEAR: PRESENT
TROPONIN T SERPL HS-MCNC: 21 NG/L
WBC # BLD AUTO: 11 10E3/UL (ref 4–11)

## 2023-05-21 PROCEDURE — 99291 CRITICAL CARE FIRST HOUR: CPT | Mod: 25

## 2023-05-21 PROCEDURE — 93005 ELECTROCARDIOGRAM TRACING: CPT | Performed by: EMERGENCY MEDICINE

## 2023-05-21 PROCEDURE — 84484 ASSAY OF TROPONIN QUANT: CPT | Performed by: EMERGENCY MEDICINE

## 2023-05-21 PROCEDURE — 36415 COLL VENOUS BLD VENIPUNCTURE: CPT | Performed by: INTERNAL MEDICINE

## 2023-05-21 PROCEDURE — 120N000001 HC R&B MED SURG/OB

## 2023-05-21 PROCEDURE — 250N000009 HC RX 250: Performed by: HOSPITALIST

## 2023-05-21 PROCEDURE — 250N000011 HC RX IP 250 OP 636: Performed by: EMERGENCY MEDICINE

## 2023-05-21 PROCEDURE — 86003 ALLG SPEC IGE CRUDE XTRC EA: CPT | Performed by: INTERNAL MEDICINE

## 2023-05-21 PROCEDURE — 85007 BL SMEAR W/DIFF WBC COUNT: CPT | Performed by: EMERGENCY MEDICINE

## 2023-05-21 PROCEDURE — 5A09457 ASSISTANCE WITH RESPIRATORY VENTILATION, 24-96 CONSECUTIVE HOURS, CONTINUOUS POSITIVE AIRWAY PRESSURE: ICD-10-PCS | Performed by: EMERGENCY MEDICINE

## 2023-05-21 PROCEDURE — 83735 ASSAY OF MAGNESIUM: CPT | Performed by: EMERGENCY MEDICINE

## 2023-05-21 PROCEDURE — 83880 ASSAY OF NATRIURETIC PEPTIDE: CPT | Performed by: EMERGENCY MEDICINE

## 2023-05-21 PROCEDURE — 71250 CT THORAX DX C-: CPT

## 2023-05-21 PROCEDURE — 99223 1ST HOSP IP/OBS HIGH 75: CPT | Performed by: HOSPITALIST

## 2023-05-21 PROCEDURE — 36415 COLL VENOUS BLD VENIPUNCTURE: CPT | Performed by: EMERGENCY MEDICINE

## 2023-05-21 PROCEDURE — 85027 COMPLETE CBC AUTOMATED: CPT | Performed by: EMERGENCY MEDICINE

## 2023-05-21 PROCEDURE — 96374 THER/PROPH/DIAG INJ IV PUSH: CPT

## 2023-05-21 PROCEDURE — 250N000009 HC RX 250: Performed by: EMERGENCY MEDICINE

## 2023-05-21 PROCEDURE — 94640 AIRWAY INHALATION TREATMENT: CPT | Mod: 76

## 2023-05-21 PROCEDURE — 94640 AIRWAY INHALATION TREATMENT: CPT

## 2023-05-21 PROCEDURE — 71045 X-RAY EXAM CHEST 1 VIEW: CPT

## 2023-05-21 PROCEDURE — 94660 CPAP INITIATION&MGMT: CPT

## 2023-05-21 PROCEDURE — 87637 SARSCOV2&INF A&B&RSV AMP PRB: CPT | Performed by: EMERGENCY MEDICINE

## 2023-05-21 PROCEDURE — 80048 BASIC METABOLIC PNL TOTAL CA: CPT | Performed by: EMERGENCY MEDICINE

## 2023-05-21 PROCEDURE — 82785 ASSAY OF IGE: CPT | Performed by: INTERNAL MEDICINE

## 2023-05-21 PROCEDURE — 87040 BLOOD CULTURE FOR BACTERIA: CPT | Performed by: EMERGENCY MEDICINE

## 2023-05-21 PROCEDURE — 999N000157 HC STATISTIC RCP TIME EA 10 MIN

## 2023-05-21 PROCEDURE — 83605 ASSAY OF LACTIC ACID: CPT | Performed by: EMERGENCY MEDICINE

## 2023-05-21 PROCEDURE — 82805 BLOOD GASES W/O2 SATURATION: CPT | Performed by: EMERGENCY MEDICINE

## 2023-05-21 PROCEDURE — C9803 HOPD COVID-19 SPEC COLLECT: HCPCS

## 2023-05-21 PROCEDURE — 87305 ASPERGILLUS AG IA: CPT | Performed by: INTERNAL MEDICINE

## 2023-05-21 RX ORDER — BISACODYL 10 MG
10 SUPPOSITORY, RECTAL RECTAL DAILY PRN
Status: DISCONTINUED | OUTPATIENT
Start: 2023-05-21 | End: 2023-05-27 | Stop reason: HOSPADM

## 2023-05-21 RX ORDER — ACETAMINOPHEN 650 MG/1
650 SUPPOSITORY RECTAL EVERY 6 HOURS PRN
Status: DISCONTINUED | OUTPATIENT
Start: 2023-05-21 | End: 2023-05-27 | Stop reason: HOSPADM

## 2023-05-21 RX ORDER — ONDANSETRON 2 MG/ML
4 INJECTION INTRAMUSCULAR; INTRAVENOUS EVERY 6 HOURS PRN
Status: DISCONTINUED | OUTPATIENT
Start: 2023-05-21 | End: 2023-05-27 | Stop reason: HOSPADM

## 2023-05-21 RX ORDER — PIPERACILLIN SODIUM, TAZOBACTAM SODIUM 3; .375 G/15ML; G/15ML
3.38 INJECTION, POWDER, LYOPHILIZED, FOR SOLUTION INTRAVENOUS ONCE
Status: COMPLETED | OUTPATIENT
Start: 2023-05-21 | End: 2023-05-21

## 2023-05-21 RX ORDER — BUDESONIDE 0.5 MG/2ML
1 INHALANT ORAL 2 TIMES DAILY
Status: DISCONTINUED | OUTPATIENT
Start: 2023-05-21 | End: 2023-05-27 | Stop reason: HOSPADM

## 2023-05-21 RX ORDER — ALBUTEROL SULFATE 5 MG/ML
2.5 SOLUTION RESPIRATORY (INHALATION) ONCE
Status: COMPLETED | OUTPATIENT
Start: 2023-05-21 | End: 2023-05-21

## 2023-05-21 RX ORDER — LEVALBUTEROL INHALATION SOLUTION 1.25 MG/3ML
SOLUTION RESPIRATORY (INHALATION)
Status: DISCONTINUED
Start: 2023-05-21 | End: 2023-05-21 | Stop reason: WASHOUT

## 2023-05-21 RX ORDER — PREDNISONE 10 MG/1
10 TABLET ORAL DAILY
Status: ON HOLD | COMMUNITY
End: 2023-05-27

## 2023-05-21 RX ORDER — CALCIUM CARBONATE 500 MG/1
1000 TABLET, CHEWABLE ORAL 4 TIMES DAILY PRN
Status: DISCONTINUED | OUTPATIENT
Start: 2023-05-21 | End: 2023-05-27 | Stop reason: HOSPADM

## 2023-05-21 RX ORDER — LIDOCAINE 40 MG/G
CREAM TOPICAL
Status: DISCONTINUED | OUTPATIENT
Start: 2023-05-21 | End: 2023-05-27 | Stop reason: HOSPADM

## 2023-05-21 RX ORDER — ACETAMINOPHEN 325 MG/1
650 TABLET ORAL EVERY 6 HOURS PRN
Status: DISCONTINUED | OUTPATIENT
Start: 2023-05-21 | End: 2023-05-27 | Stop reason: HOSPADM

## 2023-05-21 RX ORDER — METHYLPREDNISOLONE SODIUM SUCCINATE 125 MG/2ML
125 INJECTION, POWDER, LYOPHILIZED, FOR SOLUTION INTRAMUSCULAR; INTRAVENOUS ONCE
Status: COMPLETED | OUTPATIENT
Start: 2023-05-21 | End: 2023-05-21

## 2023-05-21 RX ORDER — LEVALBUTEROL INHALATION SOLUTION 0.63 MG/3ML
0.63 SOLUTION RESPIRATORY (INHALATION) EVERY 6 HOURS PRN
Status: DISCONTINUED | OUTPATIENT
Start: 2023-05-21 | End: 2023-05-21

## 2023-05-21 RX ORDER — PIPERACILLIN SODIUM, TAZOBACTAM SODIUM 3; .375 G/15ML; G/15ML
3.38 INJECTION, POWDER, LYOPHILIZED, FOR SOLUTION INTRAVENOUS EVERY 8 HOURS
Status: DISCONTINUED | OUTPATIENT
Start: 2023-05-22 | End: 2023-05-25

## 2023-05-21 RX ORDER — LEVALBUTEROL INHALATION SOLUTION 0.63 MG/3ML
0.63 SOLUTION RESPIRATORY (INHALATION) EVERY 4 HOURS PRN
Status: DISCONTINUED | OUTPATIENT
Start: 2023-05-21 | End: 2023-05-24

## 2023-05-21 RX ORDER — DOCUSATE SODIUM 100 MG/1
100 CAPSULE, LIQUID FILLED ORAL 2 TIMES DAILY PRN
Status: DISCONTINUED | OUTPATIENT
Start: 2023-05-21 | End: 2023-05-27 | Stop reason: HOSPADM

## 2023-05-21 RX ORDER — ONDANSETRON 4 MG/1
4 TABLET, ORALLY DISINTEGRATING ORAL EVERY 6 HOURS PRN
Status: DISCONTINUED | OUTPATIENT
Start: 2023-05-21 | End: 2023-05-27 | Stop reason: HOSPADM

## 2023-05-21 RX ADMIN — PIPERACILLIN AND TAZOBACTAM 3.38 G: 3; .375 INJECTION, POWDER, LYOPHILIZED, FOR SOLUTION INTRAVENOUS at 20:45

## 2023-05-21 RX ADMIN — METHYLPREDNISOLONE SODIUM SUCCINATE 125 MG: 125 INJECTION, POWDER, LYOPHILIZED, FOR SOLUTION INTRAMUSCULAR; INTRAVENOUS at 18:29

## 2023-05-21 RX ADMIN — ALBUTEROL SULFATE 2.5 MG: 2.5 SOLUTION RESPIRATORY (INHALATION) at 18:17

## 2023-05-21 RX ADMIN — BUDESONIDE 1 MG: 0.5 INHALANT ORAL at 23:19

## 2023-05-21 RX ADMIN — ALBUTEROL SULFATE 2.5 MG: 2.5 SOLUTION RESPIRATORY (INHALATION) at 18:18

## 2023-05-21 ASSESSMENT — ACTIVITIES OF DAILY LIVING (ADL)
ADLS_ACUITY_SCORE: 35

## 2023-05-21 NOTE — ED NOTES
Bed: JNED-08  Expected date: 5/21/23  Expected time: 5:50 PM  Means of arrival: Ambulance  Comments:  David 646- [id number] - resp distress, 85% on 6L, bipap and duoneb 97%, RR 40s,

## 2023-05-21 NOTE — ED TRIAGE NOTES
Pt arrrived via [facility] from home due to SOB. Pt was found with O2 sat @ 72% on RA. EMS reported 85% O2 sat on 6L. With tachycardia in 110s. Pt was put on Bipap upon arrival. Now satting around 95%. Pt is calm and denies pain.      Triage Assessment     Row Name 05/21/23 9933       Triage Assessment (Adult)    Airway WDL WDL    Additional Documentation Breath Sounds (Group)       Respiratory WDL    Respiratory WDL X;rhythm/pattern    Rhythm/Pattern, Respiratory shortness of breath;tachypneic       Breath Sounds    Breath Sounds All Fields    All Lung Fields Breath Sounds wheezes, expiratory;crackles, coarse       Cardiac WDL    Cardiac WDL X;rhythm    Pulse Rate & Regularity tachycardic       Peripheral/Neurovascular WDL    Peripheral Neurovascular WDL WDL       Cognitive/Neuro/Behavioral WDL    Cognitive/Neuro/Behavioral WDL X;orientation    Level of Consciousness confused    Orientation disoriented to;situation;time

## 2023-05-21 NOTE — ED PROVIDER NOTES
EMERGENCY DEPARTMENT ENCOUNTER      NAME: Veronika Hernandez  AGE: 87 year old female  YOB: 1935  MRN: [id number]  EVALUATION DATE & TIME: No admission date for patient encounter.    PCP: Georgia Tucker    ED PROVIDER: Fabi Ch M.D.      Chief Complaint   Patient presents with     Shortness of Breath         FINAL IMPRESSION:  1. Acute respiratory failure with hypoxia (H)    2. Pneumonia due to infectious organism, unspecified laterality, unspecified part of lung    3. Shortness of breath    4. COPD exacerbation (H)    5. Acute on chronic congestive heart failure, unspecified heart failure type (H)        ED COURSE & MEDICAL DECISION MAKING:    Pertinent Labs & Imaging studies reviewed. (See chart for details)  6:04 PM I met with the patient for the initial interview and physical examination. Discussed plan for treatment and workup in the ED.    7:56 PM I rechecked and updated the patient   8:20 PM I discussed the case with hospitalist, Dr Tang, who accepts the patient    ED Course as of 05/21/23 2103   Sun May 21, 2023   1815 Patient is a pleasant 87-year-old female who comes in today for evaluation of worsening shortness of breath and hypoxia at home.  She was found to have a sat of 72% at home on her normal 2 L.  When fire showed up they moved up to 6 L and got her up to 85% when EMS got there.  She has a history of COPD and asthma.  She been on prednisone for the last 4 days which is not improving.  She was tach into the 110s when EMS arrived.  They put her on BiPAP and gave her a DuoNeb but she did have some improvement in her symptoms.  She was still tachypneic and tachycardic on arrival here but did look comfortable.  I do not think she is on her way to needing intubation.  We will give her a couple more nebs here and some Solu-Medrol.  She will need admission to the hospital for acute respiratory failure.  I will order blood work and a chest x-ray as well.  She is in agreement with the  plan.   1859 Lab work shows a BNP of 3200.  Certainly that could be contributing to this.  That should respond well to BiPAP as well.  We are still waiting for her to get her x-ray.  Her heart rate is down to 104.  Respiratory rate is still elevated.   1955 X-ray shows a possible pneumonia.  I think with the patient's symptoms we will put her on Rocephin and azithromycin and work in getting her admitted.   2011 I checked back in on the patient.  I spoke with patient's daughter.  She gave me a little more history.  We talked about the likelihood that there may be a little pneumonia and the need for admission to the hospital.  She is only requiring 45% FiO2 on her BiPAP so I think she can go to the floor.  I will work on talking to the admitting team.   2015 I discussed admission with Dr. Tang with the hospitalist service.  She agrees with a [facility] telemetry inpatient admission for respiratory failure, pneumonia, and COPD exacerbation.       Medical Decision Making    History:    Supplemental history from: EMS    External Record(s) reviewed: I reviewed the patient's family medicine visit from 4/14/2023.  She has a history of asthma and allergic bronchopulmonary aspergillosis.  She has been following with [facility] pulmonology.  She has been managing with prednisone.    Work Up:    Emergent/Severe conditions considered and evaluated for: Pneumonia, pneumothorax, COPD exacerbation, respiratory failure, electrolyte abnormality, renal failure, congestive heart failure exacerbation    I independently reviewed and interpreted EKG and chest x-ray which showed no pneumothorax.  See full radiology report for all details    In additional to work up documented, I considered the following work up: None    Medications given that require intensive monitoring for toxicity: None    External consultation:    Discussion of management with another provider: Hospitalist    Complicating factors:    Care impacted by chronic illness:  Hypertension, COPD, and Asthma    Care affected by social determinants of health: None    Disposition considerations: Admission    At the conclusion of the encounter I discussed  the results of all of the tests and the disposition with patient.   All questions were answered.  The patient acknowledged understanding and was involved in the decision making regarding the overall care plan.      35 minutes of critical care time     MEDICATIONS GIVEN IN THE EMERGENCY:  Medications   piperacillin-tazobactam (ZOSYN) 3.375 g vial to attach to  mL bag (3.375 g Intravenous $New Bag 5/21/23 2045)   albuterol (PROVENTIL) neb solution 2.5 mg (2.5 mg Nebulization $Given 5/21/23 1818)   albuterol (PROVENTIL) neb solution 2.5 mg (2.5 mg Nebulization $Given 5/21/23 1817)   methylPREDNISolone sodium succinate (solu-MEDROL) injection 125 mg (125 mg Intravenous $Given 5/21/23 1829)     =================================================================    HPI    Triage Note:      Patient information was obtained from: Patient and EMS    Use of : N/A         Veronika Hernandez is a 87 year old female with a pertinent medical history of hypertension, asthma, and COPD who presents to this ED by ambulance for evaluation of shortness of breath.     Per EMS, patient arrives from home where she lives independently with her . She has a known history of severe COPD and asthma as well as recurring fungal infections of the lung associated with seasonal changes. Recently she has been short of breath. Her daughter noticed that her O2 today on 2L of oxygen was at 72% which cecile up to 85% on 6L. Patient has been on prednisone for the last 4 days which is for her recurring lung infection without improvement at this time.     Patient reports that she is feeling much better after neb treatments and BIPAP. Patient denies additional symptoms or complaints at this time.     PAST MEDICAL HISTORY:  Past Medical History:   Diagnosis Date      Arthritis      Asthma      Asthma exacerbation 6/13/2017     Hypertension      Osteoporosis        PAST SURGICAL HISTORY:  Past Surgical History:   Procedure Laterality Date     CHOLECYSTECTOMY       HEMORRHOIDECTOMY INTERNAL LIGATION      Description: Hemorrhoidectomy;  Proc Date: 07/01/2008;     IR LUMBAR EPIDURAL STEROID INJECTION  2/13/2004     IR MISCELLANEOUS PROCEDURE  4/27/2009     IR MISCELLANEOUS PROCEDURE  4/27/2009     AK LAP, VENTRAL HERNIA REPAIR,REDUCIBLE      Description: Laparoscopy Repair Of Umbilical Hernia;  Recorded: 06/10/2010;     AK PERC VERTEB AUGMENT/ KYPHOPLAST, THOR      Description: Percutaneous Vertebral Augmentation Thoracic;  Proc Date: 04/01/2009;     ZZC LAP,CHOLECYSTECTOMY/EXPLORE      Description: Cholecystectomy Laparoscopic;  Recorded: 06/10/2010;       CURRENT MEDICATIONS:      Current Facility-Administered Medications:      piperacillin-tazobactam (ZOSYN) 3.375 g vial to attach to  mL bag, 3.375 g, Intravenous, Once, Fabi Ch MD, 3.375 g at 05/21/23 2045    Current Outpatient Medications:      acetaminophen (TYLENOL) 325 MG tablet, [ACETAMINOPHEN (TYLENOL) 325 MG TABLET] Take 650 mg by mouth every 6 (six) hours as needed for pain., Disp: , Rfl:      albuterol (PROVENTIL) 2.5 mg /3 mL (0.083 %) nebulizer solution, [ALBUTEROL (PROVENTIL) 2.5 MG /3 ML (0.083 %) NEBULIZER SOLUTION] Take 2.5 mg by nebulization every 6 (six) hours as needed for wheezing., Disp: , Rfl:      amLODIPine (NORVASC) 10 MG tablet, [AMLODIPINE (NORVASC) 10 MG TABLET] Take 10 mg by mouth daily., Disp: , Rfl:      ASMANEX TWISTHALER 220 mcg (30 doses) AePB inhaler, [ASMANEX TWISTHALER 220 MCG (30 DOSES) AEPB INHALER] INHALE 1 PUFF DAILY., Disp: 3 each, Rfl: 3     aspirin 81 MG EC tablet, [ASPIRIN 81 MG EC TABLET] Take 1 tablet (81 mg total) by mouth daily., Disp: , Rfl: 0     calcium carbonate-vitamin D3 (CALCIUM 600 + D,3,) 600 mg(1,500mg) -400 unit per tablet, [CALCIUM  "CARBONATE-VITAMIN D3 (CALCIUM 600 + D,3,) 600 MG(1,500MG) -400 UNIT PER TABLET] Take 1 tablet by mouth 2 (two) times a day with meals.       , Disp: , Rfl:      predniSONE (DELTASONE) 10 MG tablet, Take 10 mg by mouth daily Takes 2 daily (20 mg) for 7 days, followed but 1 daily (10 mg) for 7 days prn for flare ups, Disp: , Rfl:     ALLERGIES:  Allergies   Allergen Reactions     Azithromycin Anaphylaxis     Cefdinir Unknown     Annotation: SOB, wheeze       Cephalexin Unknown     Annotation: asthma exacerbation       Contrast [Iohexol] Unknown     Pt states she \"almost \"      Erythromycin Base [Erythromycin] Unknown     difficulty breathing       Iodine Unknown     difficulty breathing       Moxifloxacin Unknown       FAMILY HISTORY:  Family History   Problem Relation Age of Onset     Heart Disease Mother        SOCIAL HISTORY:   Social History     Socioeconomic History     Marital status:    Tobacco Use     Smoking status: Never     Smokeless tobacco: Never   Substance and Sexual Activity     Alcohol use: Yes     Comment: Alcoholic Drinks/day: 1 drink a day     Drug use: No       PHYSICAL EXAM    VITAL SIGNS: /61   Pulse 100   Temp 98.2  F (36.8  C) (Axillary)   Resp (!) 41   Ht 1.626 m (5' 4\")   Wt 45.4 kg (100 lb)   SpO2 94%   BMI 17.16 kg/m     GENERAL: Awake, alert, answering questions appropriately, appears comfortable on BIPAP  SPEECH:  Easy to understand speech, Normal volume and sy  PULMONARY: Tachypnea, on BIPAP   CARDIOVASCULAR: Tachycardic. Regular rhythm, Distal pulses present and normal.  ABDOMINAL: Soft, Nondistended, Nontender, No rebound or guarding, No palpable masses  EXTREMITIES: No lower extremity edema.  PSYCH: Normal mood and affect     LAB:  All pertinent labs reviewed and interpreted.  Results for orders placed or performed during the hospital encounter of 23   XR Chest Port 1 View    Impression    IMPRESSION: Mild diffuse interstitial change, favoring a " mild pulmonary vascular congestion pattern. Scattered soft tissue densities throughout the right lung, which appears to represent progression of scarring in the right upper lobe, though   superimposed acute infiltrate is not excluded. Opacity within the periphery of the right lung base is possibly more acute. Uncertain if this represents airspace consolidation and/or a small right pleural effusion. CT imaging may be helpful for further   assessment. At a minimum, short-term follow-up chest radiograph is recommended to document stability or resolution of these findings.    No pneumothorax.    Heart size is normal. Atherosclerosis of the thoracic aorta.    Postsurgical changes of the visualized lumbar spine. Vertebroplasty changes of the thoracolumbar junction, as well.   Basic metabolic panel   Result Value Ref Range    Sodium 135 (L) 136 - 145 mmol/L    Potassium 3.9 3.4 - 5.3 mmol/L    Chloride 97 (L) 98 - 107 mmol/L    Carbon Dioxide (CO2) 24 22 - 29 mmol/L    Anion Gap 14 7 - 15 mmol/L    Urea Nitrogen 23.4 (H) 8.0 - 23.0 mg/dL    Creatinine 0.51 0.51 - 0.95 mg/dL    Calcium 9.3 8.8 - 10.2 mg/dL    Glucose 146 (H) 70 - 99 mg/dL    GFR Estimate 90 >60 mL/min/1.73m2   Result Value Ref Range    Troponin T, High Sensitivity 21 (H) <=14 ng/L   Result Value Ref Range    Magnesium 2.0 1.7 - 2.3 mg/dL   Blood gas arterial   Result Value Ref Range    pH Arterial 7.41 7.37 - 7.44    pCO2 Arterial 46 (H) 35 - 45 mm Hg    pO2 Arterial 78 75 - 85 mm Hg    Bicarbonate Arterial 29 23 - 29 mmol/L    O2 Sat, Arterial 96.7 (H) 95.0 - 96.0 %    Oxyhemoglobin 95.3 95.0 - 96.0 %    Base Excess/Deficit (+/-) 4.0   mmol/L    Sample Stabilized Temperature 37.0 degrees C   Nt probnp inpatient (BNP)   Result Value Ref Range    N terminal Pro BNP Inpatient 3,254 (H) 0 - 1,800 pg/mL   CBC with platelets and differential   Result Value Ref Range    WBC Count 11.0 4.0 - 11.0 10e3/uL    RBC Count 4.35 3.80 - 5.20 10e6/uL    Hemoglobin 12.5  11.7 - 15.7 g/dL    Hematocrit 39.1 35.0 - 47.0 %    MCV 90 78 - 100 fL    MCH 28.7 26.5 - 33.0 pg    MCHC 32.0 31.5 - 36.5 g/dL    RDW 12.5 10.0 - 15.0 %    Platelet Count 362 150 - 450 10e3/uL   Symptomatic Influenza A/B, RSV, & SARS-CoV2 PCR (COVID-19) Nose    Specimen: Nose; Swab   Result Value Ref Range    Influenza A PCR Negative Negative    Influenza B PCR Negative Negative    RSV PCR Negative Negative    SARS CoV2 PCR Negative Negative   Manual Differential   Result Value Ref Range    % Neutrophils 87 %    % Lymphocytes 2 %    % Monocytes 10 %    % Eosinophils 0 %    % Basophils 0 %    % Metamyelocytes 1 %    Absolute Neutrophils 9.6 (H) 1.6 - 8.3 10e3/uL    Absolute Lymphocytes 0.2 (L) 0.8 - 5.3 10e3/uL    Absolute Monocytes 1.1 0.0 - 1.3 10e3/uL    Absolute Eosinophils 0.0 0.0 - 0.7 10e3/uL    Absolute Basophils 0.0 0.0 - 0.2 10e3/uL    Absolute Metamyelocytes 0.1 (H) <=0.0 10e3/uL    RBC Morphology Confirmed RBC Indices     Platelet Assessment  Automated Count Confirmed. Platelet morphology is normal.     Automated Count Confirmed. Platelet morphology is normal.    Toxic Neutrophils Present (A) None Seen   Lactic Acid STAT   Result Value Ref Range    Lactic Acid 2.5 (H) 0.7 - 2.0 mmol/L       RADIOLOGY:  XR Chest Port 1 View   Final Result   IMPRESSION: Mild diffuse interstitial change, favoring a mild pulmonary vascular congestion pattern. Scattered soft tissue densities throughout the right lung, which appears to represent progression of scarring in the right upper lobe, though    superimposed acute infiltrate is not excluded. Opacity within the periphery of the right lung base is possibly more acute. Uncertain if this represents airspace consolidation and/or a small right pleural effusion. CT imaging may be helpful for further    assessment. At a minimum, short-term follow-up chest radiograph is recommended to document stability or resolution of these findings.      No pneumothorax.      Heart size is  normal. Atherosclerosis of the thoracic aorta.      Postsurgical changes of the visualized lumbar spine. Vertebroplasty changes of the thoracolumbar junction, as well.              EKG:    Date and time: May 21, 2023 at 1817  Rate: 105 bpm  Rhythm: Sinus tachycardia  SC interval: 128 ms  QRS interval: 66 ms  QT/QTc: 312/412 ms  ST changes or T wave changes: Small amount of ST depression in the lateral leads  Change from prior ECG: No significant change from prior  I have independently reviewed and interpreted this EKG.     PROCEDURES:     Critical Care     Performed by: Dr Fabi Ch  Authorized by: Dr Fabi Ch  Total critical care time: 35 minutes  Critical care was necessary to treat or prevent imminent or life-threatening deterioration of the following conditions:  Acute hypoxic respiratory failure requiring BiPAP  Critical care was time spent personally by me on the following activities: development of treatment plan with patient or surrogate, discussions with consultants, examination of patient, evaluation of patient's response to treatment, obtaining history from patient or surrogate, ordering and performing treatments and interventions, ordering and review of laboratory studies, ordering and review of radiographic studies, re-evaluation of patient's condition and monitoring for potential decompensation.  Critical care time was exclusive of separately billable procedures and treating other patients.        I, Elijah Sanford, am serving as a scribe to document services personally performed by Dr. Ch based on my observation and the provider's statements to me. I, Fabi Ch MD attest that Elijah Sanford is acting in a scribe capacity, has observed my performance of the services and has documented them in accordance with my direction.    Fabi Ch M.D.  Emergency Medicine  [facility] EMERGENCY DEPARTMENT  [facility] [facility]  [id number]  [phone]  Dept: [phone]     Fabi Ch MD  05/21/23 2125

## 2023-05-22 ENCOUNTER — APPOINTMENT (OUTPATIENT)
Dept: CARDIOLOGY | Facility: HOSPITAL | Age: 88
DRG: 177 | End: 2023-05-22
Attending: HOSPITALIST
Payer: COMMERCIAL

## 2023-05-22 LAB
ANION GAP SERPL CALCULATED.3IONS-SCNC: 13 MMOL/L (ref 7–15)
BUN SERPL-MCNC: 25.9 MG/DL (ref 8–23)
CALCIUM SERPL-MCNC: 9 MG/DL (ref 8.8–10.2)
CHLORIDE SERPL-SCNC: 101 MMOL/L (ref 98–107)
CREAT SERPL-MCNC: 0.5 MG/DL (ref 0.51–0.95)
DEPRECATED HCO3 PLAS-SCNC: 27 MMOL/L (ref 22–29)
ERYTHROCYTE [DISTWIDTH] IN BLOOD BY AUTOMATED COUNT: 12.6 % (ref 10–15)
GFR SERPL CREATININE-BSD FRML MDRD: 90 ML/MIN/1.73M2
GLUCOSE SERPL-MCNC: 152 MG/DL (ref 70–99)
HCT VFR BLD AUTO: 36.5 % (ref 35–47)
HGB BLD-MCNC: 11.7 G/DL (ref 11.7–15.7)
HOLD SPECIMEN: NORMAL
LACTATE SERPL-SCNC: 0.9 MMOL/L (ref 0.7–2)
LACTATE SERPL-SCNC: 1.3 MMOL/L (ref 0.7–2)
LVEF ECHO: NORMAL
MAGNESIUM SERPL-MCNC: 2.2 MG/DL (ref 1.7–2.3)
MCH RBC QN AUTO: 28.7 PG (ref 26.5–33)
MCHC RBC AUTO-ENTMCNC: 32.1 G/DL (ref 31.5–36.5)
MCV RBC AUTO: 90 FL (ref 78–100)
PLATELET # BLD AUTO: 338 10E3/UL (ref 150–450)
POTASSIUM SERPL-SCNC: 3.5 MMOL/L (ref 3.4–5.3)
PROCALCITONIN SERPL IA-MCNC: 0.9 NG/ML
RBC # BLD AUTO: 4.07 10E6/UL (ref 3.8–5.2)
SODIUM SERPL-SCNC: 141 MMOL/L (ref 136–145)
WBC # BLD AUTO: 8.9 10E3/UL (ref 4–11)

## 2023-05-22 PROCEDURE — 84145 PROCALCITONIN (PCT): CPT | Performed by: INTERNAL MEDICINE

## 2023-05-22 PROCEDURE — 250N000013 HC RX MED GY IP 250 OP 250 PS 637: Performed by: INTERNAL MEDICINE

## 2023-05-22 PROCEDURE — 93306 TTE W/DOPPLER COMPLETE: CPT | Mod: 26 | Performed by: INTERNAL MEDICINE

## 2023-05-22 PROCEDURE — 94660 CPAP INITIATION&MGMT: CPT

## 2023-05-22 PROCEDURE — C9113 INJ PANTOPRAZOLE SODIUM, VIA: HCPCS | Performed by: INTERNAL MEDICINE

## 2023-05-22 PROCEDURE — 36415 COLL VENOUS BLD VENIPUNCTURE: CPT | Performed by: HOSPITALIST

## 2023-05-22 PROCEDURE — 83605 ASSAY OF LACTIC ACID: CPT | Performed by: INTERNAL MEDICINE

## 2023-05-22 PROCEDURE — 94640 AIRWAY INHALATION TREATMENT: CPT

## 2023-05-22 PROCEDURE — 120N000001 HC R&B MED SURG/OB

## 2023-05-22 PROCEDURE — 94640 AIRWAY INHALATION TREATMENT: CPT | Mod: 76

## 2023-05-22 PROCEDURE — 99222 1ST HOSP IP/OBS MODERATE 55: CPT | Performed by: INTERNAL MEDICINE

## 2023-05-22 PROCEDURE — 999N000157 HC STATISTIC RCP TIME EA 10 MIN

## 2023-05-22 PROCEDURE — 83605 ASSAY OF LACTIC ACID: CPT | Performed by: HOSPITALIST

## 2023-05-22 PROCEDURE — 36415 COLL VENOUS BLD VENIPUNCTURE: CPT | Performed by: INTERNAL MEDICINE

## 2023-05-22 PROCEDURE — 93306 TTE W/DOPPLER COMPLETE: CPT

## 2023-05-22 PROCEDURE — 80048 BASIC METABOLIC PNL TOTAL CA: CPT | Performed by: HOSPITALIST

## 2023-05-22 PROCEDURE — 99233 SBSQ HOSP IP/OBS HIGH 50: CPT | Performed by: INTERNAL MEDICINE

## 2023-05-22 PROCEDURE — 999N000127 HC STATISTIC PERIPHERAL IV START W US GUIDANCE

## 2023-05-22 PROCEDURE — 250N000011 HC RX IP 250 OP 636: Performed by: INTERNAL MEDICINE

## 2023-05-22 PROCEDURE — 250N000011 HC RX IP 250 OP 636: Performed by: HOSPITALIST

## 2023-05-22 PROCEDURE — 83735 ASSAY OF MAGNESIUM: CPT | Performed by: INTERNAL MEDICINE

## 2023-05-22 PROCEDURE — 250N000009 HC RX 250: Performed by: HOSPITALIST

## 2023-05-22 PROCEDURE — 85027 COMPLETE CBC AUTOMATED: CPT | Performed by: HOSPITALIST

## 2023-05-22 RX ORDER — ASPIRIN 81 MG/1
81 TABLET ORAL DAILY
Status: DISCONTINUED | OUTPATIENT
Start: 2023-05-22 | End: 2023-05-27 | Stop reason: HOSPADM

## 2023-05-22 RX ORDER — AMLODIPINE BESYLATE 5 MG/1
10 TABLET ORAL DAILY
Status: DISCONTINUED | OUTPATIENT
Start: 2023-05-23 | End: 2023-05-27 | Stop reason: HOSPADM

## 2023-05-22 RX ORDER — METHYLPREDNISOLONE SODIUM SUCCINATE 125 MG/2ML
60 INJECTION, POWDER, LYOPHILIZED, FOR SOLUTION INTRAMUSCULAR; INTRAVENOUS EVERY 6 HOURS
Status: DISCONTINUED | OUTPATIENT
Start: 2023-05-22 | End: 2023-05-23

## 2023-05-22 RX ORDER — HEPARIN SODIUM 5000 [USP'U]/.5ML
5000 INJECTION, SOLUTION INTRAVENOUS; SUBCUTANEOUS EVERY 12 HOURS
Status: DISCONTINUED | OUTPATIENT
Start: 2023-05-22 | End: 2023-05-27 | Stop reason: HOSPADM

## 2023-05-22 RX ADMIN — PIPERACILLIN AND TAZOBACTAM 3.38 G: 3; .375 INJECTION, POWDER, LYOPHILIZED, FOR SOLUTION INTRAVENOUS at 11:01

## 2023-05-22 RX ADMIN — LEVALBUTEROL HYDROCHLORIDE 0.63 MG: 0.63 SOLUTION RESPIRATORY (INHALATION) at 20:33

## 2023-05-22 RX ADMIN — LEVALBUTEROL HYDROCHLORIDE 0.63 MG: 0.63 SOLUTION RESPIRATORY (INHALATION) at 08:19

## 2023-05-22 RX ADMIN — Medication 81 MG: at 20:59

## 2023-05-22 RX ADMIN — PIPERACILLIN AND TAZOBACTAM 3.38 G: 3; .375 INJECTION, POWDER, LYOPHILIZED, FOR SOLUTION INTRAVENOUS at 01:42

## 2023-05-22 RX ADMIN — METHYLPREDNISOLONE SODIUM SUCCINATE 62.5 MG: 125 INJECTION, POWDER, LYOPHILIZED, FOR SOLUTION INTRAMUSCULAR; INTRAVENOUS at 20:59

## 2023-05-22 RX ADMIN — IPRATROPIUM BROMIDE 0.5 MG: 0.5 SOLUTION RESPIRATORY (INHALATION) at 16:00

## 2023-05-22 RX ADMIN — METHYLPREDNISOLONE SODIUM SUCCINATE 62.5 MG: 125 INJECTION, POWDER, LYOPHILIZED, FOR SOLUTION INTRAMUSCULAR; INTRAVENOUS at 14:34

## 2023-05-22 RX ADMIN — HEPARIN SODIUM 5000 UNITS: 10000 INJECTION, SOLUTION INTRAVENOUS; SUBCUTANEOUS at 21:00

## 2023-05-22 RX ADMIN — IPRATROPIUM BROMIDE 0.5 MG: 0.5 SOLUTION RESPIRATORY (INHALATION) at 08:19

## 2023-05-22 RX ADMIN — HEPARIN SODIUM 5000 UNITS: 10000 INJECTION, SOLUTION INTRAVENOUS; SUBCUTANEOUS at 08:55

## 2023-05-22 RX ADMIN — PIPERACILLIN AND TAZOBACTAM 3.38 G: 3; .375 INJECTION, POWDER, LYOPHILIZED, FOR SOLUTION INTRAVENOUS at 18:14

## 2023-05-22 RX ADMIN — PANTOPRAZOLE SODIUM 40 MG: 40 INJECTION, POWDER, FOR SOLUTION INTRAVENOUS at 14:39

## 2023-05-22 RX ADMIN — BUDESONIDE 1 MG: 0.5 INHALANT ORAL at 08:31

## 2023-05-22 RX ADMIN — IPRATROPIUM BROMIDE 0.5 MG: 0.5 SOLUTION RESPIRATORY (INHALATION) at 20:47

## 2023-05-22 RX ADMIN — METHYLPREDNISOLONE SODIUM SUCCINATE 62.5 MG: 125 INJECTION, POWDER, LYOPHILIZED, FOR SOLUTION INTRAMUSCULAR; INTRAVENOUS at 08:54

## 2023-05-22 RX ADMIN — BUDESONIDE 1 MG: 0.5 INHALANT ORAL at 20:33

## 2023-05-22 RX ADMIN — IPRATROPIUM BROMIDE 0.5 MG: 0.5 SOLUTION RESPIRATORY (INHALATION) at 11:28

## 2023-05-22 ASSESSMENT — ACTIVITIES OF DAILY LIVING (ADL)
ADLS_ACUITY_SCORE: 37
ADLS_ACUITY_SCORE: 37
DIFFICULTY_COMMUNICATING: NO
ADLS_ACUITY_SCORE: 37
ADLS_ACUITY_SCORE: 35
WALKING_OR_CLIMBING_STAIRS_DIFFICULTY: YES
ADLS_ACUITY_SCORE: 37
WEAR_GLASSES_OR_BLIND: NO
DRESS: 0-->ASSISTANCE NEEDED (DEVELOPMENTALLY APPROPRIATE)
FALL_HISTORY_WITHIN_LAST_SIX_MONTHS: NO
DIFFICULTY_EATING/SWALLOWING: NO
DOING_ERRANDS_INDEPENDENTLY_DIFFICULTY: YES
BATHING: 1-->ASSISTANCE NEEDED
HEARING_DIFFICULTY_OR_DEAF: NO
EQUIPMENT_CURRENTLY_USED_AT_HOME: WALKER, ROLLING
DRESSING/BATHING_DIFFICULTY: NO
DEPENDENT_IADLS:: CLEANING;COOKING;LAUNDRY;SHOPPING;MEAL PREPARATION;MEDICATION MANAGEMENT;MONEY MANAGEMENT;TRANSPORTATION
ADLS_ACUITY_SCORE: 37
ADLS_ACUITY_SCORE: 37
TRANSFERRING: 1-->ASSISTANCE (EQUIPMENT/PERSON) NEEDED
ADLS_ACUITY_SCORE: 33
DRESSING/BATHING: BATHING DIFFICULTY, ASSISTANCE 1 PERSON
CONCENTRATING,_REMEMBERING_OR_MAKING_DECISIONS_DIFFICULTY: NO
ADLS_ACUITY_SCORE: 37
ADLS_ACUITY_SCORE: 37
CHANGE_IN_FUNCTIONAL_STATUS_SINCE_ONSET_OF_CURRENT_ILLNESS/INJURY: NO
WALKING_OR_CLIMBING_STAIRS: OTHER (SEE COMMENTS)
TOILETING_ISSUES: NO
ADLS_ACUITY_SCORE: 37
ADLS_ACUITY_SCORE: 37
TRANSFERRING: 0-->ASSISTANCE NEEDED (DEVELOPMENTALLY APPROPRIATE)
DRESS: 1-->ASSISTANCE (EQUIPMENT/PERSON) NEEDED

## 2023-05-22 NOTE — PROGRESS NOTES
Patient on BIPAP ST 14/7 20 40%. Albuterol nebs given x2 this shift. Patient was transported to CT and back to room safely on Bipap. RT will continue to monitor.

## 2023-05-22 NOTE — H&P
[facility]    History and Physical - Hospitalist Service       Date of Admission:  2023  Veronika Hernandez,  1935, MRN [id number]  PCP: Georgia Tucker    Assessment & Plan      Veronika Hernandez is a 87 year old female admitted on 2023. 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  Noting black-colored phlegm which is typical of her ABPA exacerbations  Here with hypoxia, hypercarbia, tachypnea and increased work of breathing, placed on BiPAP  Remains fairly tachypneic.  Planning to give her scheduled and as needed neb treatments  IV Zosyn (several abx allergies)  Discussed with intensivist, recommended CT of the chest, Pulmicort nebs, pulmonology consult for tomorrow, and he was sending some labs to evaluate current status of her ABPA better. No role for voriconazole usually  Patient is full code but family are going to discuss whether she should change to DNI    #Elevated BNP  No known history of CHF, BNP elevated here 3.2k  Chest x-ray showing possible pulmonary vascular congestion, possible pleural effusion  Echocardiogram  Hold on diuresis, she is currently n.p.o. due to being on BiPAP but we will also not give her IV maintenance fluid  CT of the chest as above will be helpful to better assess whether there are pleural effusions  Recheck trop to trend  Tele    #?Runs of SVT  Runs of narrow complex tachycardia noted on bedside monitor during interview  Tele monitoring  EKG if rhythm change    #Heart health  Hold home ASA    #HTN  Hold home amlodipine while NPO    #HypoNa  ?volume expanded from CHF?  Rechekc in  "AM    #Cachexia  Noted         DVT Prophylaxis: Moderate risk. SCDs  Diet: NPO for Medical/Clinical Reasons Except for: Ice Chips, Meds    Pham Catheter: Not present  Lines: None     Cardiac Monitoring: None  Code Status: Full Code . Discussed with dtr, she is going to talk with her brothers more, considering if patient should change to DNI but undecided    Clinically Significant Risk Factors Present on Admission                # Drug Induced Platelet Defect: home medication list includes an antiplatelet medication   # Hypertension: Noted on problem list   # Non-Invasive mechanical ventilation: current O2 Device: BiPAP/CPAP  # Acute hypoxic respiratory failure: continue supplemental O2 as needed     # Cachexia: Estimated body mass index is 17.16 kg/m  as calculated from the following:    Height as of this encounter: 1.626 m (5' 4\").    Weight as of this encounter: 45.4 kg (100 lb).            Disposition Plan      Expected Discharge Date: 05/23/2023                The patient's care was discussed with the Patient and Patient's Family.    Jayde Tang MD  Hospitalist Service  [facility]  Securely message with [employer] (more info)  Text page via [employer] Paging/Directory     ______________________________________________________________________    Chief Complaint   hypoxia    History is obtained from the patient's dtr    History of Present Illness   Veronika Hernandez is a 87 year old female who is here for hypoxia.  Has been having exacerbation of her ABPA for the last few days with increased black sputum, shortness of breath.  Has oxygen concentrator at home though she does not typically use it.  Daughter had applied this to her and noted they had to steadily increase the O2 flow, today on max flow she was 70 to 80% at which point she came into the ED.  Daughter notes patient has had increasing frequency of exacerbations, typically only 2 times a year but this is her third exacerbation in the last " 12 months.  She has not required oxygen during an exacerbation in the last 3 years.  Patient has had reduced appetite.  They have been noting some peripheral edema which is new.  She has had loose stools for the past few days.  Daughter is worried patient is dehydrated and has noted decreased urine output.  Patient has not had a fever, chills.  She denies any pain.      Past Medical History    Past Medical History:   Diagnosis Date     Arthritis      Asthma      Asthma exacerbation 6/13/2017     Hypertension      Osteoporosis        Past Surgical History   Past Surgical History:   Procedure Laterality Date     CHOLECYSTECTOMY       HEMORRHOIDECTOMY INTERNAL LIGATION      Description: Hemorrhoidectomy;  Proc Date: 07/01/2008;     IR LUMBAR EPIDURAL STEROID INJECTION  2/13/2004     IR MISCELLANEOUS PROCEDURE  4/27/2009     IR MISCELLANEOUS PROCEDURE  4/27/2009     OK LAP, VENTRAL HERNIA REPAIR,REDUCIBLE      Description: Laparoscopy Repair Of Umbilical Hernia;  Recorded: 06/10/2010;     OK PERC VERTEB AUGMENT/ KYPHOPLAST, THOR      Description: Percutaneous Vertebral Augmentation Thoracic;  Proc Date: 04/01/2009;     ZZC LAP,CHOLECYSTECTOMY/EXPLORE      Description: Cholecystectomy Laparoscopic;  Recorded: 06/10/2010;       Prior to Admission Medications   Prior to Admission Medications   Prescriptions Last Dose Informant Patient Reported? Taking?   ASMANEX TWISTHALER 220 mcg (30 doses) AePB inhaler 5/21/2023 at am  No Yes   Sig: [ASMANEX TWISTHALER 220 MCG (30 DOSES) AEPB INHALER] INHALE 1 PUFF DAILY.   acetaminophen (TYLENOL) 325 MG tablet Past Month at prn  Yes Yes   Sig: [ACETAMINOPHEN (TYLENOL) 325 MG TABLET] Take 650 mg by mouth every 6 (six) hours as needed for pain.   albuterol (PROVENTIL) 2.5 mg /3 mL (0.083 %) nebulizer solution Past Month at prn  Yes Yes   Sig: [ALBUTEROL (PROVENTIL) 2.5 MG /3 ML (0.083 %) NEBULIZER SOLUTION] Take 2.5 mg by nebulization every 6 (six) hours as needed for wheezing.    amLODIPine (NORVASC) 10 MG tablet 5/21/2023 at am  Yes Yes   Sig: [AMLODIPINE (NORVASC) 10 MG TABLET] Take 10 mg by mouth daily.   aspirin 81 MG EC tablet Past Week  No Yes   Sig: [ASPIRIN 81 MG EC TABLET] Take 1 tablet (81 mg total) by mouth daily.   calcium carbonate-vitamin D3 (CALCIUM 600 + D,3,) 600 mg(1,500mg) -400 unit per tablet 5/21/2023 at am  Yes Yes   Sig: [CALCIUM CARBONATE-VITAMIN D3 (CALCIUM 600 + D,3,) 600 MG(1,500MG) -400 UNIT PER TABLET] Take 1 tablet by mouth 2 (two) times a day with meals.          predniSONE (DELTASONE) 10 MG tablet 5/21/2023 at am  Yes Yes   Sig: Take 10 mg by mouth daily Takes 2 daily (20 mg) for 7 days, followed but 1 daily (10 mg) for 7 days prn for flare ups      Facility-Administered Medications: None        Physical Exam   Vital Signs: Temp: 98.2  F (36.8  C) Temp src: Axillary BP: 127/62 Pulse: 96   Resp: (!) 39 SpO2: 94 % O2 Device: BiPAP/CPAP    Weight: 100 lbs 0 oz  General: in no apparent distress, non-toxic and alert female lying in hospital bed not able to answer much questions due to BiPAP mask. Keeps asking to eat. Does shake/nod head appropriately but often dtr answers for her  HEENT: Head normocephalic atraumatic, oral mucosa moist. Sclerae anicteric  CV: Regular rhythm, normal rate, no murmurs  Resp: Coarse expiratory wheezes throughout, no rales or rhonchi, no focal consolidations  GI: Belly soft, nondistended, nontender, bowel sounds present  Skin: No rashes or lesions  Extremities: Trace ankle edema bilaterally  Psych: Normal affect, mood euthymic  Neuro: CNII-XII grossly intact, moving all 4 extremities    Medical Decision Making                 Data   Imaging results reviewed over the past 24 hrs:   Recent Results (from the past 24 hour(s))   XR Chest Port 1 View    Narrative    EXAM: XR CHEST PORT 1 VIEW  LOCATION: [facility]  DATE/TIME: 5/21/2023 7:32 PM CDT    INDICATION: Shortness of breath.  COMPARISON: 05/15/2021.       Impression    IMPRESSION: Mild diffuse interstitial change, favoring a mild pulmonary vascular congestion pattern. Scattered soft tissue densities throughout the right lung, which appears to represent progression of scarring in the right upper lobe, though   superimposed acute infiltrate is not excluded. Opacity within the periphery of the right lung base is possibly more acute. Uncertain if this represents airspace consolidation and/or a small right pleural effusion. CT imaging may be helpful for further   assessment. At a minimum, short-term follow-up chest radiograph is recommended to document stability or resolution of these findings.    No pneumothorax.    Heart size is normal. Atherosclerosis of the thoracic aorta.    Postsurgical changes of the visualized lumbar spine. Vertebroplasty changes of the thoracolumbar junction, as well.     Recent Results (from the past 12 hour(s))   Basic metabolic panel    Collection Time: 05/21/23  6:26 PM   Result Value Ref Range    Sodium 135 (L) 136 - 145 mmol/L    Potassium 3.9 3.4 - 5.3 mmol/L    Chloride 97 (L) 98 - 107 mmol/L    Carbon Dioxide (CO2) 24 22 - 29 mmol/L    Anion Gap 14 7 - 15 mmol/L    Urea Nitrogen 23.4 (H) 8.0 - 23.0 mg/dL    Creatinine 0.51 0.51 - 0.95 mg/dL    Calcium 9.3 8.8 - 10.2 mg/dL    Glucose 146 (H) 70 - 99 mg/dL    GFR Estimate 90 >60 mL/min/1.73m2   Troponin T, High Sensitivity    Collection Time: 05/21/23  6:26 PM   Result Value Ref Range    Troponin T, High Sensitivity 21 (H) <=14 ng/L   Magnesium    Collection Time: 05/21/23  6:26 PM   Result Value Ref Range    Magnesium 2.0 1.7 - 2.3 mg/dL   Nt probnp inpatient (BNP)    Collection Time: 05/21/23  6:26 PM   Result Value Ref Range    N terminal Pro BNP Inpatient 3,254 (H) 0 - 1,800 pg/mL   CBC with platelets and differential    Collection Time: 05/21/23  6:26 PM   Result Value Ref Range    WBC Count 11.0 4.0 - 11.0 10e3/uL    RBC Count 4.35 3.80 - 5.20 10e6/uL    Hemoglobin 12.5 11.7 - 15.7  g/dL    Hematocrit 39.1 35.0 - 47.0 %    MCV 90 78 - 100 fL    MCH 28.7 26.5 - 33.0 pg    MCHC 32.0 31.5 - 36.5 g/dL    RDW 12.5 10.0 - 15.0 %    Platelet Count 362 150 - 450 10e3/uL   Manual Differential    Collection Time: 05/21/23  6:26 PM   Result Value Ref Range    % Neutrophils 87 %    % Lymphocytes 2 %    % Monocytes 10 %    % Eosinophils 0 %    % Basophils 0 %    % Metamyelocytes 1 %    Absolute Neutrophils 9.6 (H) 1.6 - 8.3 10e3/uL    Absolute Lymphocytes 0.2 (L) 0.8 - 5.3 10e3/uL    Absolute Monocytes 1.1 0.0 - 1.3 10e3/uL    Absolute Eosinophils 0.0 0.0 - 0.7 10e3/uL    Absolute Basophils 0.0 0.0 - 0.2 10e3/uL    Absolute Metamyelocytes 0.1 (H) <=0.0 10e3/uL    RBC Morphology Confirmed RBC Indices     Platelet Assessment  Automated Count Confirmed. Platelet morphology is normal.     Automated Count Confirmed. Platelet morphology is normal.    Toxic Neutrophils Present (A) None Seen   Blood gas arterial    Collection Time: 05/21/23  6:33 PM   Result Value Ref Range    pH Arterial 7.41 7.37 - 7.44    pCO2 Arterial 46 (H) 35 - 45 mm Hg    pO2 Arterial 78 75 - 85 mm Hg    Bicarbonate Arterial 29 23 - 29 mmol/L    O2 Sat, Arterial 96.7 (H) 95.0 - 96.0 %    Oxyhemoglobin 95.3 95.0 - 96.0 %    Base Excess/Deficit (+/-) 4.0   mmol/L    Sample Stabilized Temperature 37.0 degrees C   Symptomatic Influenza A/B, RSV, & SARS-CoV2 PCR (COVID-19) Nose    Collection Time: 05/21/23  7:38 PM    Specimen: Nose; Swab   Result Value Ref Range    Influenza A PCR Negative Negative    Influenza B PCR Negative Negative    RSV PCR Negative Negative    SARS CoV2 PCR Negative Negative   Lactic Acid STAT    Collection Time: 05/21/23  8:25 PM   Result Value Ref Range    Lactic Acid 2.5 (H) 0.7 - 2.0 mmol/L

## 2023-05-22 NOTE — ED NOTES
"[facility] ED Handoff Report    ED Chief Complaint: SOB    ED Diagnosis:  (J96.01) Acute respiratory failure with hypoxia (H)  Comment: ON BIpap @ 40% FiO2   Plan:     (J18.9) Pneumonia due to infectious organism, unspecified laterality, unspecified part of lung  Comment: ABX given   Plan:     (R06.02) Shortness of breath  Comment:  Plan:     (J44.1) COPD exacerbation (H)  Comment:   Plan:     (I50.9) Acute on chronic congestive heart failure, unspecified heart failure type (H)  Comment:   Plan:        PMH:    Past Medical History:   Diagnosis Date     Arthritis      Asthma      Asthma exacerbation 6/13/2017     Hypertension      Osteoporosis         Code Status:  No Order     Falls Risk: Yes Band: Applied    Current Living Situation/Residence: lives with a significant other     Elimination Status: Continent: No     Activity Level: 2 assist    Patients Preferred Language:  English     Needed: No    Vital Signs:  /61   Pulse 100   Temp 98.2  F (36.8  C) (Axillary)   Resp (!) 41   Ht 1.626 m (5' 4\")   Wt 45.4 kg (100 lb)   SpO2 94%   BMI 17.16 kg/m       Cardiac Rhythm: Sinus tachycardia    Pain Score: 0/10    Is the Patient Confused:  Yes    Last Food or Drink: 05/21/23 at     Focused Assessment:      Respiratory: Pt has crackles and wheezing in lung bilaterally. Endorsed SOB. Is now on BIpap. Respirations are still 39/min    Cardiac: Tachycardic    Neurocognitive: Pt has memory loss, but is alert and oriented to place and person.         Tests Performed: Done: Labs and Imaging    Treatments Provided:  Bipap, ABX,     Family Dynamics/Concerns: No    Family Updated On Visitor Policy: Yes    Plan of Care Communicated to Family: Yes    Who Was Updated about Plan of Care: Kelly (pt's daughter) at bedside    Belongings Checklist Done and Signed by Patient: Yes    Medications sent with patient: N/A    Covid: symptomatic, negative    Additional Information:     RN: Rebecca Maya RN   " 5/21/2023 9:04 PM

## 2023-05-22 NOTE — PROGRESS NOTES
"Continues on BiPAP, 14/7 20 .35, Full face mask medium, changed to under the nose mask medium. BS scattered crackles, diminished bases, Atrovent tx given inline, tolerated well, after tx increased aeration, BS mild scattered crackles. Pt lying on right side, most comfortable position per pt. No skin break down.    /59   Pulse 88   Temp 98  F (36.7  C) (Axillary)   Resp 27   Ht 1.626 m (5' 4\")   Wt 45.4 kg (100 lb)   SpO2 94%   BMI 17.16 kg/m      RT to follos  "

## 2023-05-22 NOTE — CONSULTS
"PULMONARY/CRITICAL CARE CONSULT NOTE    Date / Time of Admission:  5/21/2023  6:07 PM  Assessment:   87yoF with history of ABPA who presents with flare of her ABPA requiring bipap for work of breathing/tachypnea.     Clinically Significant Risk Factors Present on Admission                # Drug Induced Platelet Defect: home medication list includes an antiplatelet medication   # Hypertension: Noted on problem list   # Non-Invasive mechanical ventilation: current O2 Device: BiPAP/CPAP  # Acute hypoxic respiratory failure: continue supplemental O2 as needed     # Cachexia: Estimated body mass index is 17.16 kg/m  as calculated from the following:    Height as of this encounter: 1.626 m (5' 4\").    Weight as of this encounter: 45.4 kg (100 lb).                Principal Problem:    Acute respiratory failure with hypoxia and hypercapnia (H)  Active Problems:    HTN (hypertension)    Allergic bronchopulmonary aspergillosis (H)    COPD exacerbation (H)    Shortness of breath    Pneumonia due to infectious organism, unspecified laterality, unspecified part of lung        Plan:   Bipap PRN  Agree with Zosyn for now in addition to steroid given impressive infiltrate  Steroids IV initiated.  Continue inhaled steroid.   Follow up IgE level  Consider rechallenge with Voriconazole in setting of acute flare.  Daughter was in agreement if she does not show improvement in 24 hours.     Called and updated Nora who was with patient's .    Subjective:    Cc: shortness of breath    HPI: 87 year old female with history of ABPA and asthma followed at [facility] since 2020 who presents with worsening shortness of breath.    Her history is nicely outlined in pulmonary note from [facility]. Diagnosed 10 years ago with ABPA after hospitalized for breathing and was requiring frequent prednisone tapers. At baseline she is on PRN oxygen during exacerbations which present as coughing. She has tried itraconazole but didn't affect IgE level and " voriconazole caused dizziness. Omalizumab has been conisdered in the past but she was well controlled in 2021 and didn't feel it was needed. She is wheelchair bound at baseline with TBI.     Past Medical History:   Diagnosis Date     Arthritis      Asthma      Asthma exacerbation 6/13/2017     Hypertension      Osteoporosis        Social History     Tobacco Use     Smoking status: Never     Smokeless tobacco: Never   Vaping Use     Vaping status: Not on file   Substance Use Topics     Alcohol use: Yes     Comment: Alcoholic Drinks/day: 1 drink a day       Family History   Problem Relation Age of Onset     Heart Disease Mother        Current Facility-Administered Medications   Medication     - MEDICATION INSTRUCTIONS -     acetaminophen (TYLENOL) tablet 650 mg    Or     acetaminophen (TYLENOL) Suppository 650 mg     bisacodyl (DULCOLAX) suppository 10 mg     budesonide (PULMICORT) neb solution 1 mg     calcium carbonate (TUMS) chewable tablet 1,000 mg     docusate sodium (COLACE) capsule 100 mg     heparin ANTICOAGULANT injection 5,000 Units     ipratropium (ATROVENT) 0.02 % neb solution 0.5 mg     levalbuterol (XOPENEX) neb solution 0.63 mg     lidocaine (LMX4) cream     lidocaine 1 % 0.1-1 mL     melatonin tablet 1 mg     methylPREDNISolone sodium succinate (solu-MEDROL) injection 62.5 mg     ondansetron (ZOFRAN ODT) ODT tab 4 mg    Or     ondansetron (ZOFRAN) injection 4 mg     pantoprazole (PROTONIX) IV push injection 40 mg     piperacillin-tazobactam (ZOSYN) 3.375 g vial to attach to  mL bag     sodium chloride (PF) 0.9% PF flush 3 mL     sodium chloride (PF) 0.9% PF flush 3 mL     Current Outpatient Medications   Medication     acetaminophen (TYLENOL) 325 MG tablet     albuterol (PROVENTIL) 2.5 mg /3 mL (0.083 %) nebulizer solution     amLODIPine (NORVASC) 10 MG tablet     ASMANEX TWISTHALER 220 mcg (30 doses) AePB inhaler     aspirin 81 MG EC tablet     calcium carbonate-vitamin D3 (CALCIUM 600 + D,3,)  "600 mg(1,500mg) -400 unit per tablet     predniSONE (DELTASONE) 10 MG tablet         Review of Systems: 12-point Review performed and negative aside from that noted in HPI.    Objective:    Vital signs:  /66   Pulse 95   Temp 98  F (36.7  C) (Axillary)   Resp 27   Ht 1.626 m (5' 4\")   Wt 45.4 kg (100 lb)   SpO2 94%   BMI 17.16 kg/m      GENERAL APPEARANCE: elderly, frail laying in bed, responding to me.     EYES: EOMI, - PERRL     NECK: no adenopathy, no asymmetry, masses, or scars and thyroid normal to palpation     RESP: rales bilaterally, no wheezing     CV: regular rates and rhythm, normal S1 S2, no S3 or S4 and no murmur, click or rub -     ABDOMEN:  soft, nontender, no HSM or masses and bowel sounds normal     MS: extremities normal- no gross deformities noted, no evidence of inflammation in joints, FROM in all extremities.     SKIN: no suspicious lesions or rashes     NEURO: Normal strength and tone, sensory exam grossly normal, mentation intact and speech normal     PSYCH: mentation appears normal. and affect normal/bright     LYMPHATICS: No axillary, cervical, inguinal, or supraclavicular nodes        Data    CT chest:   EXAM: CT CHEST W/O CONTRAST  LOCATION: [facility]  DATE/TIME: 5/21/2023 10:53 PM CDT     INDICATION: hypoxia, allergic pulmonary aspergillosis  COMPARISON: Abdominal CT 05/30/2019  TECHNIQUE: CT chest without IV contrast. Multiplanar reformats were obtained. Dose reduction techniques were used.  CONTRAST: None.     FINDINGS:   LUNGS AND PLEURA: Prominent lung disease characterized by innumerable small irregular, inflammatory appearing pulmonary nodules predominantly involving right lung with zones of coalescing peripheral consolidation. Trace volume right pleural effusion.   Involvement within left lung is substantially less pronounced though similar tiny inflammatory nodules are present at both lung bases and lung apex. Mild diffuse inflammatory " bronchial wall thickening. All these findings are new compared to prior exam.     MEDIASTINUM/AXILLAE: Normal size heart. No significant adenopathy.     CORONARY ARTERY CALCIFICATION: Moderate.     UPPER ABDOMEN: No significant finding.     MUSCULOSKELETAL: Compression abnormalities lower thoracic spine similar to previous exam no suspicious bony lesion.. Previous vertebroplasty's and fusion upper lumbar spine.                                                                      IMPRESSION:   1.  Pronounced lung disease substantially worse on the right, characterized by innumerable small inflammatory appearing pulmonary nodules and subpleural zones of denser consolidation as well as mild inflammatory bronchial wall thickening. Findings   nonspecific but atypical infection most likely.  2.  Tiny right pleural effusion.    Laboratory:  Results for orders placed or performed during the hospital encounter of 05/21/23   Basic metabolic panel   Result Value Ref Range    Sodium 141 136 - 145 mmol/L    Potassium 3.5 3.4 - 5.3 mmol/L    Chloride 101 98 - 107 mmol/L    Carbon Dioxide (CO2) 27 22 - 29 mmol/L    Anion Gap 13 7 - 15 mmol/L    Urea Nitrogen 25.9 (H) 8.0 - 23.0 mg/dL    Creatinine 0.50 (L) 0.51 - 0.95 mg/dL    Calcium 9.0 8.8 - 10.2 mg/dL    Glucose 152 (H) 70 - 99 mg/dL    GFR Estimate 90 >60 mL/min/1.73m2     Lab Results   Component Value Date    WBC 8.9 05/22/2023    HGB 11.7 05/22/2023    HCT 36.5 05/22/2023    MCV 90 05/22/2023     05/22/2023

## 2023-05-22 NOTE — PROGRESS NOTES
"Cotitnues on BIPAP 14/7, 20, .35, BS diminished bases, Xopenex/Atrovent/Budesoniide tx given inline, tolerated well. BS after increased aeration, diffuse coarse rhonchi. Changed to over the nose full face mask medium.Pt lying to right side, most comfortable position per pt.     /55   Pulse 95   Temp 98  F (36.7  C) (Axillary)   Resp (!) 54   Ht 1.626 m (5' 4\")   Wt 45.4 kg (100 lb)   SpO2 95%   BMI 17.16 kg/m      RT to monitor    "

## 2023-05-22 NOTE — PROGRESS NOTES
-Patient with history of ABPA has been admitted for worsen ing hypoxemic resp failure.   - Recommend Ct chest.  Budesonide nebs, IV solumedrol and Nebulized bronchodilators.  -Ordered aspergillus Glucomannan and IGE levels.   -Patient is on BPAP and last gas has normal Ph.     -Will get full pulmonary note tomorrow.     Bg Hunter MD

## 2023-05-22 NOTE — PROGRESS NOTES
Saturations improved and FIO2 titrated to 35%. Pt remains tachypnic and on BIPAP: ST 14/7 20 35%. sats 94%. Nebs given as ordered. BS coarse pre/post treatment. Pt is alert/oriented. RT following.    Ricki Wells, RT

## 2023-05-22 NOTE — PHARMACY-ADMISSION MEDICATION HISTORY
Pharmacist Admission Medication History    Admission medication history is complete. The information provided in this note is only as accurate as the sources available at the time of the update.    Medication reconciliation/reorder completed by provider prior to medication history? No    Information Source(s): Family member, Clinic records and CareEverywhere/SureScripts via in-person    Pertinent Information: Spoke with patient's daughter     Changes made to PTA medication list:    Added: prednisone     Deleted: None    Changed: None    Medication Affordability:       Allergies reviewed with patient and updates made in EHR: yes    Medication History Completed By: BENTON CORONADO RPH 5/21/2023 8:43 PM    Prior to Admission medications    Medication Sig Last Dose Taking? Auth Provider Long Term End Date   acetaminophen (TYLENOL) 325 MG tablet [ACETAMINOPHEN (TYLENOL) 325 MG TABLET] Take 650 mg by mouth every 6 (six) hours as needed for pain. Past Month at prn Yes Provider, Historical     albuterol (PROVENTIL) 2.5 mg /3 mL (0.083 %) nebulizer solution [ALBUTEROL (PROVENTIL) 2.5 MG /3 ML (0.083 %) NEBULIZER SOLUTION] Take 2.5 mg by nebulization every 6 (six) hours as needed for wheezing. Past Month at prn Yes Provider, Historical     amLODIPine (NORVASC) 10 MG tablet [AMLODIPINE (NORVASC) 10 MG TABLET] Take 10 mg by mouth daily. 5/21/2023 at am Yes Provider, Historical Yes    ASMANEX TWISTHALER 220 mcg (30 doses) AePB inhaler [ASMANEX TWISTHALER 220 MCG (30 DOSES) AEPB INHALER] INHALE 1 PUFF DAILY. 5/21/2023 at am Yes Yuli Mata MD Yes    aspirin 81 MG EC tablet [ASPIRIN 81 MG EC TABLET] Take 1 tablet (81 mg total) by mouth daily. Past Week Yes Eleno Landaverde MD     calcium carbonate-vitamin D3 (CALCIUM 600 + D,3,) 600 mg(1,500mg) -400 unit per tablet [CALCIUM CARBONATE-VITAMIN D3 (CALCIUM 600 + D,3,) 600 MG(1,500MG) -400 UNIT PER TABLET] Take 1 tablet by mouth 2 (two) times a day with meals.        5/21/2023 at  am Yes Provider, Historical     predniSONE (DELTASONE) 10 MG tablet Take 10 mg by mouth daily Takes 2 daily (20 mg) for 7 days, followed but 1 daily (10 mg) for 7 days prn for flare ups 5/21/2023 at am Yes Unknown, Entered By History

## 2023-05-22 NOTE — PROGRESS NOTES
"Continues on BiPAP 14/7, 20, .35. BS diffuse crackles, diminished bases. Atrovent HHN tx given inline, tolerated well. No post tx eval, RT busy.     /61   Pulse 95   Temp 98  F (36.7  C) (Axillary)   Resp (!) 39   Ht 1.626 m (5' 4\")   Wt 45.4 kg (100 lb)   SpO2 94%   BMI 17.16 kg/m        RT to follow.   "

## 2023-05-22 NOTE — CONSULTS
Care Management Initial Consult    General Information  Assessment completed with: Children, sons Ivette  Type of CM/SW Visit: Initial Assessment    Primary Care Provider verified and updated as needed: Yes   Readmission within the last 30 days: no previous admission in last 30 days         Advance Care Planning: Advance Care Planning Reviewed:  (has HCD, family trying to locate)          Communication Assessment  Patient's communication style: spoken language (English or Bilingual)             Cognitive  Cognitive/Neuro/Behavioral: WDL  Level of Consciousness: confused     Orientation: disoriented to, time, situation             Living Environment:   People in home: spouse     Current living Arrangements: house      Able to return to prior arrangements: yes       Family/Social Support:  Care provided by: spouse/significant other  Provides care for: no one, unable/limited ability to care for self  Marital Status:   , Children          Description of Support System: Supportive, Involved         Current Resources:   Patient receiving home care services: No     Community Resources: None  Equipment currently used at home:  Electric wheelchair,  Oxygen-concentrator, no portable tanks, nebulizer machine  Supplies currently used at home: None         Does the patient's insurance plan have a 3 day qualifying hospital stay waiver?  N/A    Lifestyle & Psychosocial Needs:  Social Determinants of Health     Tobacco Use: Low Risk  (5/21/2023)    Patient History      Smoking Tobacco Use: Never      Smokeless Tobacco Use: Never      Passive Exposure: Not on file   Alcohol Use: Not on file   Financial Resource Strain: Not on file   Food Insecurity: Not on file   Transportation Needs: Not on file   Physical Activity: Not on file   Stress: Not on file   Social Connections: Not on file   Intimate Partner Violence: Not on file   Depression: Not on file   Housing Stability: Not on file       Functional  Status:  Prior to admission patient needed assistance:   Dependent ADLs:: Bathing, Dressing, Wheelchair-independent, Transfers, Grooming  Dependent IADLs:: Cleaning, Cooking, Laundry, Shopping, Meal Preparation, Medication Management, Money Management, Transportation         Additional Information:    Assessment completed with patient's sons Collin and Eleno.  Patient lives with her spouse in their house. She requires assistance with ADLs and IADLs. She uses an electric wheelchair and has been able to transfer with assistance from spouse. Patient has nebulizer machine and oxygen concentrator but not portable tank. Family is anticipating need for more help in the home. Daughter Kelly stops by frequently to provide support. Family willing to transport at discharge.    PCP did not have a copy of HCD.    Final discharge plan pending progression and recommendations.         Nery Multani RN

## 2023-05-22 NOTE — PROGRESS NOTES
[facility]    Medicine Progress Note - Hospitalist Service    Date of Admission:  5/21/2023    Assessment & Plan     Veronika Hernandez is a 87 year old female admitted on 5/21/2023. 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  -Noting black-colored phlegm which is typical of her ABPA exacerbations  -Here with hypoxia, hypercarbia, tachypnea and increased work of breathing, placed on BiPAP  -nebs, and pulmicort nebs as well  -iv steroids  -iv Zosyn  -CT chest done-- Pronounced lung disease substantially worse on the right, characterized by innumerable small inflammatory appearing pulmonary nodules and subpleural zones of denser consolidation as well as mild inflammatory bronchial wall thickening. Findings   nonspecific but atypical infection most likely. Tiny right pleural effusion.  -pulmonary to see    2.Elevated BNP  -No known history of CHF, BNP elevated here 3.2k  -Chest x-ray showing possible pulmonary vascular congestion, possible pleural effusion  -Echocardiogram-pending  -Hold on diuresis, she is currently n.p.o. due to being on BiPAP but we will also not give her IV maintenance fluid  -Tele     3.?Runs of SVT  -Runs of narrow complex tachycardia noted   -Tele monitoring  -EKG if rhythm change     4.HTN  -restart amlodipine      5.HypoNa  ?volume expanded from CHF?  -trend     6.Cachexia          Daughter here for rounds       Diet: NPO for Medical/Clinical Reasons Except for: Ice Chips, Meds    DVT Prophylaxis: Heparin SQ  Pham Catheter: Not present  Lines: None     Cardiac Monitoring: ACTIVE  "order. Indication: Acute decompensated heart failure (48 hours)  Code Status: Full Code      Clinically Significant Risk Factors Present on Admission                # Drug Induced Platelet Defect: home medication list includes an antiplatelet medication   # Hypertension: Noted on problem list   # Non-Invasive mechanical ventilation: current O2 Device: BiPAP/CPAP  # Acute hypoxic respiratory failure: continue supplemental O2 as needed     # Cachexia: Estimated body mass index is 17.16 kg/m  as calculated from the following:    Height as of this encounter: 1.626 m (5' 4\").    Weight as of this encounter: 45.4 kg (100 lb).            Disposition Plan      Expected Discharge Date: 05/23/2023                  Nicole Palomino MD  Hospitalist Service  [facility]  Securely message with [employer]OTU (more info)  Text page via [employer] Paging/Directory   ______________________________________________________________________    Interval History   She is still on bipap  Feels about same with breathing     Physical Exam   Vital Signs: Temp: 98  F (36.7  C) Temp src: Axillary BP: 112/59 Pulse: 88   Resp: 27 SpO2: 94 % O2 Device: BiPAP/CPAP    Weight: 100 lbs 0 oz    Constitutional: awake, fatigued, alert, cooperative and no apparent distress  Respiratory: tachypneic, moderate air exchange, no retractions and diminished breath sounds throughout lungs  Cardiovascular: Normal apical impulse, regular rate and rhythm, normal S1 and S2, no S3 or S4, and no murmur noted  GI: normal bowel sounds and soft  Skin: no bruising or bleeding  Musculoskeletal: no lower extremity pitting edema present  Neurologic: Mental Status Exam:  Level of Alertness:   awake  Neuropsychiatric: General: normal, calm and normal eye contact    Medical Decision Making       55 MINUTES SPENT BY ME on the date of service doing chart review, history, exam, documentation & further activities per the note.      Data     I have personally reviewed " the following data over the past 24 hrs:    8.9  \   11.7   / 338     141 101 25.9 (H) /  152 (H)   3.5 27 0.50 (L) \       Trop: 21 (H) BNP: 3,254 (H)       Procal: N/A CRP: N/A Lactic Acid: 1.3         Imaging results reviewed over the past 24 hrs:   Recent Results (from the past 24 hour(s))   XR Chest Port 1 View    Narrative    EXAM: XR CHEST PORT 1 VIEW  LOCATION: [facility]  DATE/TIME: 5/21/2023 7:32 PM CDT    INDICATION: Shortness of breath.  COMPARISON: 05/15/2021.      Impression    IMPRESSION: Mild diffuse interstitial change, favoring a mild pulmonary vascular congestion pattern. Scattered soft tissue densities throughout the right lung, which appears to represent progression of scarring in the right upper lobe, though   superimposed acute infiltrate is not excluded. Opacity within the periphery of the right lung base is possibly more acute. Uncertain if this represents airspace consolidation and/or a small right pleural effusion. CT imaging may be helpful for further   assessment. At a minimum, short-term follow-up chest radiograph is recommended to document stability or resolution of these findings.    No pneumothorax.    Heart size is normal. Atherosclerosis of the thoracic aorta.    Postsurgical changes of the visualized lumbar spine. Vertebroplasty changes of the thoracolumbar junction, as well.   CT Chest w/o Contrast    Narrative    EXAM: CT CHEST W/O CONTRAST  LOCATION: [facility]  DATE/TIME: 5/21/2023 10:53 PM CDT    INDICATION: hypoxia, allergic pulmonary aspergillosis  COMPARISON: Abdominal CT 05/30/2019  TECHNIQUE: CT chest without IV contrast. Multiplanar reformats were obtained. Dose reduction techniques were used.  CONTRAST: None.    FINDINGS:   LUNGS AND PLEURA: Prominent lung disease characterized by innumerable small irregular, inflammatory appearing pulmonary nodules predominantly involving right lung with zones of coalescing peripheral  consolidation. Trace volume right pleural effusion.   Involvement within left lung is substantially less pronounced though similar tiny inflammatory nodules are present at both lung bases and lung apex. Mild diffuse inflammatory bronchial wall thickening. All these findings are new compared to prior exam.    MEDIASTINUM/AXILLAE: Normal size heart. No significant adenopathy.    CORONARY ARTERY CALCIFICATION: Moderate.    UPPER ABDOMEN: No significant finding.    MUSCULOSKELETAL: Compression abnormalities lower thoracic spine similar to previous exam no suspicious bony lesion.. Previous vertebroplasty's and fusion upper lumbar spine.      Impression    IMPRESSION:   1.  Pronounced lung disease substantially worse on the right, characterized by innumerable small inflammatory appearing pulmonary nodules and subpleural zones of denser consolidation as well as mild inflammatory bronchial wall thickening. Findings   nonspecific but atypical infection most likely.  2.  Tiny right pleural effusion.

## 2023-05-22 NOTE — PROGRESS NOTES
"Changed BIPAP mask to medium full face mask. Pt tolerating well. RT to follow    /61   Pulse 95   Temp 98  F (36.7  C) (Axillary)   Resp (!) 39   Ht 1.626 m (5' 4\")   Wt 45.4 kg (100 lb)   SpO2 94%   BMI 17.16 kg/m        "

## 2023-05-22 NOTE — ED NOTES
Pt is c/o dry mouth. Mouth swabbed. Pt is consistently breathing between 36-40 RPM. Hospitalist notified.

## 2023-05-23 ENCOUNTER — APPOINTMENT (OUTPATIENT)
Dept: RADIOLOGY | Facility: HOSPITAL | Age: 88
DRG: 177 | End: 2023-05-23
Attending: INTERNAL MEDICINE
Payer: COMMERCIAL

## 2023-05-23 ENCOUNTER — APPOINTMENT (OUTPATIENT)
Dept: SPEECH THERAPY | Facility: HOSPITAL | Age: 88
DRG: 177 | End: 2023-05-23
Payer: COMMERCIAL

## 2023-05-23 LAB
A FUMIGATUS IGE QN: 74.6 KU(A)/L
ANION GAP SERPL CALCULATED.3IONS-SCNC: 14 MMOL/L (ref 7–15)
ATRIAL RATE - MUSE: 105 BPM
BUN SERPL-MCNC: 29.3 MG/DL (ref 8–23)
CALCIUM SERPL-MCNC: 8.7 MG/DL (ref 8.8–10.2)
CHLORIDE SERPL-SCNC: 102 MMOL/L (ref 98–107)
CREAT SERPL-MCNC: 0.4 MG/DL (ref 0.51–0.95)
DEPRECATED HCO3 PLAS-SCNC: 27 MMOL/L (ref 22–29)
DIASTOLIC BLOOD PRESSURE - MUSE: NORMAL MMHG
ERYTHROCYTE [DISTWIDTH] IN BLOOD BY AUTOMATED COUNT: 12.6 % (ref 10–15)
GFR SERPL CREATININE-BSD FRML MDRD: >90 ML/MIN/1.73M2
GLUCOSE SERPL-MCNC: 152 MG/DL (ref 70–99)
HCT VFR BLD AUTO: 36.6 % (ref 35–47)
HGB BLD-MCNC: 11.8 G/DL (ref 11.7–15.7)
HOLD SPECIMEN: NORMAL
HOLD SPECIMEN: NORMAL
IGE SERPL-ACNC: 938 KU/L (ref 0–114)
INTERPRETATION ECG - MUSE: NORMAL
LACTATE SERPL-SCNC: 1.2 MMOL/L (ref 0.7–2)
MAGNESIUM SERPL-MCNC: 2.6 MG/DL (ref 1.7–2.3)
MCH RBC QN AUTO: 29 PG (ref 26.5–33)
MCHC RBC AUTO-ENTMCNC: 32.2 G/DL (ref 31.5–36.5)
MCV RBC AUTO: 90 FL (ref 78–100)
P AXIS - MUSE: 80 DEGREES
PLATELET # BLD AUTO: 344 10E3/UL (ref 150–450)
POTASSIUM SERPL-SCNC: 3.7 MMOL/L (ref 3.4–5.3)
PR INTERVAL - MUSE: 128 MS
QRS DURATION - MUSE: 66 MS
QT - MUSE: 312 MS
QTC - MUSE: 412 MS
R AXIS - MUSE: 32 DEGREES
RBC # BLD AUTO: 4.07 10E6/UL (ref 3.8–5.2)
SODIUM SERPL-SCNC: 143 MMOL/L (ref 136–145)
SYSTOLIC BLOOD PRESSURE - MUSE: NORMAL MMHG
T AXIS - MUSE: -27 DEGREES
VENTRICULAR RATE- MUSE: 105 BPM
WBC # BLD AUTO: 8.3 10E3/UL (ref 4–11)

## 2023-05-23 PROCEDURE — 82310 ASSAY OF CALCIUM: CPT | Performed by: INTERNAL MEDICINE

## 2023-05-23 PROCEDURE — 120N000001 HC R&B MED SURG/OB

## 2023-05-23 PROCEDURE — 87070 CULTURE OTHR SPECIMN AEROBIC: CPT | Performed by: INTERNAL MEDICINE

## 2023-05-23 PROCEDURE — 36415 COLL VENOUS BLD VENIPUNCTURE: CPT | Performed by: INTERNAL MEDICINE

## 2023-05-23 PROCEDURE — 94660 CPAP INITIATION&MGMT: CPT

## 2023-05-23 PROCEDURE — 250N000013 HC RX MED GY IP 250 OP 250 PS 637: Performed by: HOSPITALIST

## 2023-05-23 PROCEDURE — 250N000011 HC RX IP 250 OP 636: Performed by: HOSPITALIST

## 2023-05-23 PROCEDURE — 94640 AIRWAY INHALATION TREATMENT: CPT

## 2023-05-23 PROCEDURE — 250N000013 HC RX MED GY IP 250 OP 250 PS 637: Performed by: INTERNAL MEDICINE

## 2023-05-23 PROCEDURE — 92611 MOTION FLUOROSCOPY/SWALLOW: CPT | Mod: GN

## 2023-05-23 PROCEDURE — C9113 INJ PANTOPRAZOLE SODIUM, VIA: HCPCS | Performed by: INTERNAL MEDICINE

## 2023-05-23 PROCEDURE — 94640 AIRWAY INHALATION TREATMENT: CPT | Mod: 76

## 2023-05-23 PROCEDURE — 999N000157 HC STATISTIC RCP TIME EA 10 MIN

## 2023-05-23 PROCEDURE — 250N000009 HC RX 250: Performed by: INTERNAL MEDICINE

## 2023-05-23 PROCEDURE — 87205 SMEAR GRAM STAIN: CPT | Performed by: INTERNAL MEDICINE

## 2023-05-23 PROCEDURE — 99233 SBSQ HOSP IP/OBS HIGH 50: CPT | Performed by: INTERNAL MEDICINE

## 2023-05-23 PROCEDURE — 83735 ASSAY OF MAGNESIUM: CPT | Performed by: INTERNAL MEDICINE

## 2023-05-23 PROCEDURE — 74230 X-RAY XM SWLNG FUNCJ C+: CPT

## 2023-05-23 PROCEDURE — 85027 COMPLETE CBC AUTOMATED: CPT | Performed by: INTERNAL MEDICINE

## 2023-05-23 PROCEDURE — 92610 EVALUATE SWALLOWING FUNCTION: CPT | Mod: GN

## 2023-05-23 PROCEDURE — 83605 ASSAY OF LACTIC ACID: CPT | Performed by: INTERNAL MEDICINE

## 2023-05-23 PROCEDURE — 250N000011 HC RX IP 250 OP 636: Performed by: INTERNAL MEDICINE

## 2023-05-23 PROCEDURE — 250N000009 HC RX 250: Performed by: HOSPITALIST

## 2023-05-23 RX ORDER — SODIUM CHLORIDE FOR INHALATION 3 %
3 VIAL, NEBULIZER (ML) INHALATION 2 TIMES DAILY
Status: DISCONTINUED | OUTPATIENT
Start: 2023-05-23 | End: 2023-05-27 | Stop reason: HOSPADM

## 2023-05-23 RX ORDER — METHYLPREDNISOLONE SODIUM SUCCINATE 40 MG/ML
40 INJECTION, POWDER, LYOPHILIZED, FOR SOLUTION INTRAMUSCULAR; INTRAVENOUS EVERY 12 HOURS
Status: DISCONTINUED | OUTPATIENT
Start: 2023-05-24 | End: 2023-05-24

## 2023-05-23 RX ADMIN — IPRATROPIUM BROMIDE 0.5 MG: 0.5 SOLUTION RESPIRATORY (INHALATION) at 12:00

## 2023-05-23 RX ADMIN — PIPERACILLIN AND TAZOBACTAM 3.38 G: 3; .375 INJECTION, POWDER, LYOPHILIZED, FOR SOLUTION INTRAVENOUS at 18:23

## 2023-05-23 RX ADMIN — IPRATROPIUM BROMIDE 0.5 MG: 0.5 SOLUTION RESPIRATORY (INHALATION) at 15:40

## 2023-05-23 RX ADMIN — BUDESONIDE 1 MG: 0.5 INHALANT ORAL at 19:56

## 2023-05-23 RX ADMIN — IPRATROPIUM BROMIDE 0.5 MG: 0.5 SOLUTION RESPIRATORY (INHALATION) at 19:56

## 2023-05-23 RX ADMIN — BUDESONIDE 0.5 MG: 0.5 INHALANT ORAL at 07:30

## 2023-05-23 RX ADMIN — METHYLPREDNISOLONE SODIUM SUCCINATE 62.5 MG: 125 INJECTION, POWDER, LYOPHILIZED, FOR SOLUTION INTRAMUSCULAR; INTRAVENOUS at 08:43

## 2023-05-23 RX ADMIN — LEVALBUTEROL HYDROCHLORIDE 0.63 MG: 0.63 SOLUTION RESPIRATORY (INHALATION) at 11:45

## 2023-05-23 RX ADMIN — Medication 81 MG: at 08:42

## 2023-05-23 RX ADMIN — AMLODIPINE BESYLATE 10 MG: 5 TABLET ORAL at 08:42

## 2023-05-23 RX ADMIN — IPRATROPIUM BROMIDE 0.5 MG: 0.5 SOLUTION RESPIRATORY (INHALATION) at 07:30

## 2023-05-23 RX ADMIN — PIPERACILLIN AND TAZOBACTAM 3.38 G: 3; .375 INJECTION, POWDER, LYOPHILIZED, FOR SOLUTION INTRAVENOUS at 02:37

## 2023-05-23 RX ADMIN — SODIUM CHLORIDE SOLN NEBU 3% 3 ML: 3 NEBU SOLN at 19:56

## 2023-05-23 RX ADMIN — PANTOPRAZOLE SODIUM 40 MG: 40 INJECTION, POWDER, FOR SOLUTION INTRAVENOUS at 12:02

## 2023-05-23 RX ADMIN — Medication 1 MG: at 02:47

## 2023-05-23 RX ADMIN — METHYLPREDNISOLONE SODIUM SUCCINATE 62.5 MG: 125 INJECTION, POWDER, LYOPHILIZED, FOR SOLUTION INTRAMUSCULAR; INTRAVENOUS at 13:42

## 2023-05-23 RX ADMIN — METHYLPREDNISOLONE SODIUM SUCCINATE 62.5 MG: 125 INJECTION, POWDER, LYOPHILIZED, FOR SOLUTION INTRAMUSCULAR; INTRAVENOUS at 02:34

## 2023-05-23 RX ADMIN — PIPERACILLIN AND TAZOBACTAM 3.38 G: 3; .375 INJECTION, POWDER, LYOPHILIZED, FOR SOLUTION INTRAVENOUS at 12:02

## 2023-05-23 RX ADMIN — HEPARIN SODIUM 5000 UNITS: 10000 INJECTION, SOLUTION INTRAVENOUS; SUBCUTANEOUS at 21:01

## 2023-05-23 RX ADMIN — HEPARIN SODIUM 5000 UNITS: 10000 INJECTION, SOLUTION INTRAVENOUS; SUBCUTANEOUS at 08:41

## 2023-05-23 ASSESSMENT — ACTIVITIES OF DAILY LIVING (ADL)
ADLS_ACUITY_SCORE: 49
ADLS_ACUITY_SCORE: 49
ADLS_ACUITY_SCORE: 43
ADLS_ACUITY_SCORE: 49
ADLS_ACUITY_SCORE: 43
ADLS_ACUITY_SCORE: 49
ADLS_ACUITY_SCORE: 49
ADLS_ACUITY_SCORE: 43
ADLS_ACUITY_SCORE: 49
ADLS_ACUITY_SCORE: 49
ADLS_ACUITY_SCORE: 43
ADLS_ACUITY_SCORE: 37

## 2023-05-23 NOTE — PROVIDER NOTIFICATION
Notified Dr. Palomino about patient having 7 beat run of v. Tach when she was coming back from her x ray swallow study.

## 2023-05-23 NOTE — PROGRESS NOTES
RESPIRATORY CARE NOTE   Patient is on 2 lpm N/C, BS diminished with exp wheezes, gave Atrovent treatment x3, Pulmicort & Xopenex x1 BS post treatment increased aeration with exp wheezes, patient perceives moderate improvement, patient tolerated well. PT was weaned off bipap this AM and has improving oxygenation.     Collin Coleman, RT

## 2023-05-23 NOTE — ED NOTES
"[facility] ED Handoff Report    ED Chief Complaint: Shortness of breath     ED Diagnosis:  (J96.01) Acute respiratory failure with hypoxia (H)  Comment: .  Plan: Transition off bipap, abx for infection    (J18.9) Pneumonia due to infectious organism, unspecified laterality, unspecified part of lung  Comment: .  Plan: abx    (R06.02) Shortness of breath  Comment: .  Plan: .    (J44.1) COPD exacerbation (H)  Comment: .  Plan: .    (I50.9) Acute on chronic congestive heart failure, unspecified heart failure type (H)  Comment: .  Plan: .       PMH:    Past Medical History:   Diagnosis Date    Arthritis     Asthma     Asthma exacerbation 6/13/2017    Hypertension     Osteoporosis         Code Status:  Full Code     Falls Risk: Yes Band: Applied    Current Living Situation/Residence: lives in an assisted living facility     Elimination Status: Continent: Yes     Activity Level: Total assist/lift    Patients Preferred Language:  English     Needed: No    Vital Signs:  /63   Pulse 84   Temp 98  F (36.7  C) (Axillary)   Resp 27   Ht 1.626 m (5' 4\")   Wt 45.4 kg (100 lb)   SpO2 94%   BMI 17.16 kg/m       Cardiac Rhythm: NSR    Pain Score: 3/10    Is the Patient Confused:  No    Last Food or Drink: 05/22/23 at unknown    Focused Assessment:  BLE weakness    Tests Performed: Done: Labs and Imaging    Treatments Provided:      Family Dynamics/Concerns: No    Family Updated On Visitor Policy: Yes    Plan of Care Communicated to Family: Yes    Who Was Updated about Plan of Care: family    Belongings Checklist Done and Signed by Patient: No    Medications sent with patient: yes, pulmicort    Covid: symptomatic, negative    Additional Information: .    RN: Javier Mcdonald RN   5/22/2023 7:39 PM       "

## 2023-05-23 NOTE — PLAN OF CARE
"Daughters at bedside - supportive. off BiPap- on oxymask at 4 liters, states she feel's \"pretty good\"  coughing up small amounts of thick white sputum. Denies pain.  Allows turning but strongly prefers to be on right side. Falls and pressure ulcer prevention plans in place. Cyndi Kimball RN    Problem: Gas Exchange Impaired  Goal: Optimal Gas Exchange  Outcome: Progressing  Intervention: Optimize Oxygenation and Ventilation  Recent Flowsheet Documentation  Taken 5/23/2023 0800 by Cyndi Kimball, RN  Head of Bed (HOB) Positioning: HOB at 20 degrees     Problem: Risk for Delirium  Goal: Improved Attention and Thought Clarity  Outcome: Progressing     "

## 2023-05-23 NOTE — PROGRESS NOTES
Patient is transferred from ED and is placed placed back on BIPAP due to shallow breathing and tachypnea. FIO2 titrated. Current settings: ST 14/7 20 25%; sats high 90s. Nebs given. BS coarse/diminished. RT following.    Ricki Wells, RT

## 2023-05-23 NOTE — PLAN OF CARE
"  Problem: Plan of Care - These are the overarching goals to be used throughout the patient stay.    Goal: Plan of Care Review  Description: The Plan of Care Review/Shift note should be completed every shift.  The Outcome Evaluation is a brief statement about your assessment that the patient is improving, declining, or no change.  This information will be displayed automatically on your shift note.  Outcome: Progressing  Goal: Patient-Specific Goal (Individualized)  Description: You can add care plan individualizations to a care plan. Examples of Individualization might be:  \"Parent requests to be called daily at 9am for status\", \"I have a hard time hearing out of my right ear\", or \"Do not touch me to wake me up as it startles me\".  Outcome: Progressing  Goal: Absence of Hospital-Acquired Illness or Injury  Outcome: Progressing  Intervention: Identify and Manage Fall Risk  Recent Flowsheet Documentation  Taken 5/22/2023 2209 by Katherine Vital, RN  Safety Promotion/Fall Prevention: activity supervised  Goal: Optimal Comfort and Wellbeing  Outcome: Progressing  Goal: Readiness for Transition of Care  Outcome: Progressing  Intervention: Mutually Develop Transition Plan  Recent Flowsheet Documentation  Taken 5/22/2023 2100 by Katherine Vital, RN  Equipment Currently Used at Home: walker, rolling   Goal Outcome Evaluation:       Current settings: ST 14/7 20 25%; sats high 90s. Nebs given. BS coarse/diminished. RT following.- Per RT    Pt is alert and oriented. Uses a scooter and a walker at baseline. Lives with .  Daughter is here, spending the night. Family supportive.  Pt denies pain. Purewick in place. Zosyn just finished. New IV.                  "
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
## 2023-05-23 NOTE — PROGRESS NOTES
PT taken off Bipap and placed on 4 lpm oxymask. Change has been tolerated well so far. RT will continue to follow.    Collin Coleman, RT

## 2023-05-23 NOTE — PLAN OF CARE
Problem: Gas Exchange Impaired  Goal: Optimal Gas Exchange  Outcome: Progressing     Problem: Risk for Delirium  Goal: Improved Sleep  5/23/2023 0541 by Mikki Contreras, RN  Outcome: Progressing  5/23/2023 0540 by Mikki Contreras, RN  Outcome: Progressing   Goal Outcome Evaluation:        Pt is calm and denies pain, continues with Bipap usage.  Wheezing and coarse lung sounds. Daughter is here, spending the night. /60 (BP Location: Left arm)   Pulse 81   Temp (!) 96.7  F (35.9  C) (Oral)   Resp 20   Ht 1.524 m (5')   Wt 43.3 kg (95 lb 7.4 oz)   SpO2 97%   BMI 18.64 kg/m
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
## 2023-05-23 NOTE — PLAN OF CARE
Problem: Plan of Care - These are the overarching goals to be used throughout the patient stay.    Goal: Plan of Care Review  Description: The Plan of Care Review/Shift note should be completed every shift.  The Outcome Evaluation is a brief statement about your assessment that the patient is improving, declining, or no change.  This information will be displayed automatically on your shift note.  Outcome: Progressing   Goal Outcome Evaluation:       Patient is to be turned and repositioned every two hours. Incontinent or uses the pure wick. Off bipap. RT had weaned patient to NC 2L. Patient's lungs are coarse throughout. Expiratory wheezing was heard this afternoon but subsided with repositioning. Patient had swallow study done in x ray today. She is to have pureed diet with mildly thicken liquids.

## 2023-05-23 NOTE — PROGRESS NOTES
[facility]    Medicine Progress Note - Hospitalist Service    Date of Admission:  5/21/2023    Assessment & Plan   Veronika Hernandez is a 87 year old female admitted on 5/21/2023. 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  -Noting black-colored phlegm which is typical of her ABPA exacerbations  -Here with hypoxia, hypercarbia, tachypnea and increased work of breathing, placed on BiPAP--now weaned to face mask at 4 liters am 5/23 and bipap prn  -nebs, and pulmicort nebs as well  -iv steroids  -iv Zosyn  -CT chest done-- Pronounced lung disease substantially worse on the right, characterized by innumerable small inflammatory appearing pulmonary nodules and subpleural zones of denser consolidation as well as mild inflammatory bronchial wall thickening. Findings   nonspecific but atypical infection most likely. Tiny right pleural effusion.  -pulmonary following     2.Elevated BNP  -No known history of CHF, BNP elevated here 3.2k  -Chest x-ray showing possible pulmonary vascular congestion, possible pleural effusion  -Echocardiogram-5/22/23  1.Left ventricular size, wall motion and function are normal. The ejection  fraction is 60-65%.  2.Normal right ventricle size and systolic function.  3.There is moderate to mod-severe (2-3+) tricuspid regurgitation. IVC not  interrogated.  4.Aortic valve not well interrogated, appears to be trileaflet and calcified,  suspect at least mild aortic stenosis, would suggest repeat study with  attention to the aortic valve.  5.Right ventricular systolic pressure is  elevated, consistent with mild  pulmonary hypertension.  There is no comparison study available.    -Hold on diuresis, she is currently n.p.o. due to being on BiPAP/face mask  -Tele     3.?Runs of SVT  -Runs of narrow complex tachycardia noted   -Tele monitoring  -EKG if rhythm change     4.HTN  -restarted amlodipine      5.HypoNa  -resolved  -trend     6.Cachexia  -start clears             Diet: NPO for Medical/Clinical Reasons Except for: Ice Chips, Meds    DVT Prophylaxis: Heparin SQ  Pham Catheter: Not present  Lines: None     Cardiac Monitoring: ACTIVE order. Indication: resp issues, bipap  Code Status: Full Code      Clinically Significant Risk Factors                  # Hypertension: Noted on problem list                 Disposition Plan      Expected Discharge Date: 05/29/2023                  Nicole Palomino MD  Hospitalist Service  [facility]  Securely message with "[employer] (more info)  Text page via [employer] Paging/Directory   ______________________________________________________________________    Interval History   She was weaned from Bipap to face mask  Daughter in law here for updates in person    Pt feels slightly better overall  Still not much appetite  Less sob  Still cough      Physical Exam   Vital Signs: Temp: 97.4  F (36.3  C) Temp src: Oral BP: (!) 145/63 Pulse: 86   Resp: 24 SpO2: 95 % O2 Device: Oxymask Oxygen Delivery: 4 LPM  Weight: 95 lbs 7.35 oz    Constitutional: awake, fatigued, alert, cooperative and no apparent distress  Respiratory: no increased work of breathing, moderate air exchange, no retractions and diminished breath sounds throughout lungs  Cardiovascular: Normal apical impulse, regular rate and rhythm, normal S1 and S2, no S3 or S4, and no murmur noted  GI: normal bowel sounds, soft, non-distended and non-tender  Skin: no bruising or bleeding  Musculoskeletal: no lower extremity pitting edema present  Neurologic: Mental Status Exam:  Level of  Alertness:   awake  Neuropsychiatric: General: normal, calm and normal eye contact    Medical Decision Making       55 MINUTES SPENT BY ME on the date of service doing chart review, history, exam, documentation & further activities per the note.      Data     I have personally reviewed the following data over the past 24 hrs:    8.3  \   11.8   / 344     143 102 29.3 (H) /  152 (H)   3.7 27 0.40 (L) \       Procal: N/A CRP: N/A Lactic Acid: 0.9         Imaging results reviewed over the past 24 hrs:   Recent Results (from the past 24 hour(s))   Echocardiogram Complete   Result Value    LVEF  60-65%    Narrative    [id number]  [id number]  [id number]  [id number][address] [address]     Name: GAVINO DHILLON  MRN: [id number]  : 1935  Study Date: 2023 09:55 AM  Age: 87 yrs  Gender: Female  Patient Location: [facility]  Reason For Study: CHF  Ordering Physician: YAN EISENBERG  Performed By: TERRY     BSA: 1.5 m2  Height: 64 in  Weight: 100 lb  HR: 100  BP: 134/61 mmHg  ______________________________________________________________________________  Procedure  Complete Portable Echo Adult. There is no comparison study available.  ______________________________________________________________________________  Interpretation Summary     1.Left ventricular size, wall motion and function are normal. The ejection  fraction is 60-65%.  2.Normal right ventricle size and systolic function.  3.There is moderate to mod-severe (2-3+) tricuspid regurgitation. IVC not  interrogated.  4.Aortic valve not well interrogated, appears to be trileaflet and calcified,  suspect at least mild aortic stenosis, would suggest repeat study with  attention to the aortic valve.  5.Right ventricular systolic pressure is elevated, consistent with mild  pulmonary hypertension.  There is no comparison study available.  ______________________________________________________________________________  I       WMSI = 1.00     % Normal = 100     X - Cannot   0 -                      (2) - Mildly 2 -          Segments  Size  Interpret    Hyperkinetic 1 - Normal  Hypokinetic  Hypokinetic  1-2     small                                                     7 -          3-5      moderate  3 - Akinetic 4 -          5 -         6 - Akinetic Dyskinetic   6-14    large               Dyskinetic   Aneurysmal  w/scar       w/scar       15-16   diffuse     Left Ventricle  Left ventricular size, wall motion and function are normal. The ejection  fraction is 60-65%. There is normal left ventricular wall thickness. Left  ventricular diastolic function is normal. No regional wall motion  abnormalities noted.     Right Ventricle  Normal right ventricle size and systolic function. TAPSE is normal, which is  consistent with normal right ventricular systolic function.     Atria  The left atrium is mildly dilated. Right atrial size is normal. There is no  color Doppler evidence of an atrial shunt.     Mitral Valve  There is mild to moderate mitral annular calcification. There is no mitral  regurgitation noted. There is no mitral valve stenosis.     Tricuspid Valve  The tricuspid valve is not well visualized, but is grossly normal. There is  moderate to mod-severe (2-3+) tricuspid regurgitation. Right ventricular  systolic pressure is elevated, consistent with mild pulmonary hypertension.  There is no tricuspid stenosis.     Aortic Valve  Aortic valve not well interrogated, appears to be trileaflet and calcified,  suspect at least mild aortic stenosis, would suggest repeat study with  attention to the aortic valve.     Pulmonic Valve  The pulmonic valve is not well seen, but is grossly normal. This degree of  valvular regurgitation is within normal limits. There is no pulmonic valvular  stenosis.     Vessels  The aorta root is normal. Normal size ascending aorta. IVC diameter <2.1 cm  collapsing >50% with sniff suggests a normal RA pressure of  3 mmHg.     Pericardium  There is no pericardial effusion.     Rhythm  Sinus rhythm was noted.     ______________________________________________________________________________  MMode/2D Measurements & Calculations  RV Base: 3.9 cm  TAPSE: 2.1 cm     Time Measurements  MM HR: 93.0 BPM     Doppler Measurements & Calculations  LV V1 max PG: 3.0 mmHg  LV V1 max: 86.8 cm/sec  LV V1 VTI: 18.3 cm  PA acc time: 0.10 sec  TR max [place]: 293.0 cm/sec  TR max P.3 mmHg  RV S [place]: 14.6 cm/sec     ______________________________________________________________________________  Report approved by: Pino Chilel 2023 01:18 PM

## 2023-05-24 LAB
ANION GAP SERPL CALCULATED.3IONS-SCNC: 10 MMOL/L (ref 7–15)
BUN SERPL-MCNC: 19.3 MG/DL (ref 8–23)
CALCIUM SERPL-MCNC: 8.5 MG/DL (ref 8.8–10.2)
CHLORIDE SERPL-SCNC: 101 MMOL/L (ref 98–107)
CREAT SERPL-MCNC: 0.35 MG/DL (ref 0.51–0.95)
DEPRECATED HCO3 PLAS-SCNC: 31 MMOL/L (ref 22–29)
ERYTHROCYTE [DISTWIDTH] IN BLOOD BY AUTOMATED COUNT: 12.5 % (ref 10–15)
GALACTOMANNAN AG SERPL QL IA: NEGATIVE
GALACTOMANNAN AG SPEC IA-ACNC: 0.09
GFR SERPL CREATININE-BSD FRML MDRD: >90 ML/MIN/1.73M2
GLUCOSE SERPL-MCNC: 165 MG/DL (ref 70–99)
HCT VFR BLD AUTO: 36.9 % (ref 35–47)
HGB BLD-MCNC: 11.9 G/DL (ref 11.7–15.7)
LACTATE SERPL-SCNC: 1.6 MMOL/L (ref 0.7–2)
MCH RBC QN AUTO: 28.8 PG (ref 26.5–33)
MCHC RBC AUTO-ENTMCNC: 32.2 G/DL (ref 31.5–36.5)
MCV RBC AUTO: 89 FL (ref 78–100)
PLATELET # BLD AUTO: 401 10E3/UL (ref 150–450)
POTASSIUM SERPL-SCNC: 3.3 MMOL/L (ref 3.4–5.3)
POTASSIUM SERPL-SCNC: 3.5 MMOL/L (ref 3.4–5.3)
RBC # BLD AUTO: 4.13 10E6/UL (ref 3.8–5.2)
SODIUM SERPL-SCNC: 142 MMOL/L (ref 136–145)
WBC # BLD AUTO: 9 10E3/UL (ref 4–11)

## 2023-05-24 PROCEDURE — 250N000013 HC RX MED GY IP 250 OP 250 PS 637: Performed by: INTERNAL MEDICINE

## 2023-05-24 PROCEDURE — 250N000009 HC RX 250: Performed by: HOSPITALIST

## 2023-05-24 PROCEDURE — 250N000013 HC RX MED GY IP 250 OP 250 PS 637: Performed by: HOSPITALIST

## 2023-05-24 PROCEDURE — 99233 SBSQ HOSP IP/OBS HIGH 50: CPT | Performed by: INTERNAL MEDICINE

## 2023-05-24 PROCEDURE — 250N000011 HC RX IP 250 OP 636: Performed by: HOSPITALIST

## 2023-05-24 PROCEDURE — 94799 UNLISTED PULMONARY SVC/PX: CPT

## 2023-05-24 PROCEDURE — 36415 COLL VENOUS BLD VENIPUNCTURE: CPT | Performed by: INTERNAL MEDICINE

## 2023-05-24 PROCEDURE — 250N000011 HC RX IP 250 OP 636: Performed by: INTERNAL MEDICINE

## 2023-05-24 PROCEDURE — 94640 AIRWAY INHALATION TREATMENT: CPT | Mod: 76

## 2023-05-24 PROCEDURE — 999N000157 HC STATISTIC RCP TIME EA 10 MIN

## 2023-05-24 PROCEDURE — 84132 ASSAY OF SERUM POTASSIUM: CPT | Performed by: INTERNAL MEDICINE

## 2023-05-24 PROCEDURE — 85027 COMPLETE CBC AUTOMATED: CPT | Performed by: INTERNAL MEDICINE

## 2023-05-24 PROCEDURE — 83605 ASSAY OF LACTIC ACID: CPT | Performed by: INTERNAL MEDICINE

## 2023-05-24 PROCEDURE — C9113 INJ PANTOPRAZOLE SODIUM, VIA: HCPCS | Performed by: INTERNAL MEDICINE

## 2023-05-24 PROCEDURE — 250N000009 HC RX 250: Performed by: INTERNAL MEDICINE

## 2023-05-24 PROCEDURE — 94640 AIRWAY INHALATION TREATMENT: CPT

## 2023-05-24 PROCEDURE — 272N000202 HC AEROBIKA WITH MANOMETER

## 2023-05-24 PROCEDURE — 120N000001 HC R&B MED SURG/OB

## 2023-05-24 PROCEDURE — 250N000012 HC RX MED GY IP 250 OP 636 PS 637: Performed by: INTERNAL MEDICINE

## 2023-05-24 PROCEDURE — 80048 BASIC METABOLIC PNL TOTAL CA: CPT | Performed by: INTERNAL MEDICINE

## 2023-05-24 RX ORDER — PREDNISONE 5 MG/1
10 TABLET ORAL DAILY
Status: DISCONTINUED | OUTPATIENT
Start: 2023-05-30 | End: 2023-05-25

## 2023-05-24 RX ORDER — POTASSIUM CHLORIDE 1500 MG/1
20 TABLET, EXTENDED RELEASE ORAL ONCE
Status: COMPLETED | OUTPATIENT
Start: 2023-05-24 | End: 2023-05-24

## 2023-05-24 RX ORDER — PREDNISONE 20 MG/1
40 TABLET ORAL DAILY
Status: DISCONTINUED | OUTPATIENT
Start: 2023-05-24 | End: 2023-05-25

## 2023-05-24 RX ORDER — PREDNISONE 20 MG/1
20 TABLET ORAL DAILY
Status: DISCONTINUED | OUTPATIENT
Start: 2023-05-27 | End: 2023-05-25

## 2023-05-24 RX ORDER — LEVALBUTEROL INHALATION SOLUTION 1.25 MG/3ML
1.25 SOLUTION RESPIRATORY (INHALATION) 4 TIMES DAILY
Status: DISCONTINUED | OUTPATIENT
Start: 2023-05-24 | End: 2023-05-27 | Stop reason: HOSPADM

## 2023-05-24 RX ADMIN — IPRATROPIUM BROMIDE 0.5 MG: 0.5 SOLUTION RESPIRATORY (INHALATION) at 16:13

## 2023-05-24 RX ADMIN — LEVALBUTEROL HYDROCHLORIDE 1.25 MG: 1.25 SOLUTION RESPIRATORY (INHALATION) at 16:13

## 2023-05-24 RX ADMIN — IPRATROPIUM BROMIDE 0.5 MG: 0.5 SOLUTION RESPIRATORY (INHALATION) at 07:34

## 2023-05-24 RX ADMIN — METHYLPREDNISOLONE SODIUM SUCCINATE 40 MG: 40 INJECTION, POWDER, FOR SOLUTION INTRAMUSCULAR; INTRAVENOUS at 02:02

## 2023-05-24 RX ADMIN — PIPERACILLIN AND TAZOBACTAM 3.38 G: 3; .375 INJECTION, POWDER, LYOPHILIZED, FOR SOLUTION INTRAVENOUS at 02:02

## 2023-05-24 RX ADMIN — SODIUM CHLORIDE SOLN NEBU 3% 3 ML: 3 NEBU SOLN at 19:48

## 2023-05-24 RX ADMIN — Medication 1 MG: at 02:05

## 2023-05-24 RX ADMIN — PIPERACILLIN AND TAZOBACTAM 3.38 G: 3; .375 INJECTION, POWDER, LYOPHILIZED, FOR SOLUTION INTRAVENOUS at 18:18

## 2023-05-24 RX ADMIN — Medication 81 MG: at 08:12

## 2023-05-24 RX ADMIN — HEPARIN SODIUM 5000 UNITS: 10000 INJECTION, SOLUTION INTRAVENOUS; SUBCUTANEOUS at 20:02

## 2023-05-24 RX ADMIN — IPRATROPIUM BROMIDE 0.5 MG: 0.5 SOLUTION RESPIRATORY (INHALATION) at 11:20

## 2023-05-24 RX ADMIN — IPRATROPIUM BROMIDE 0.5 MG: 0.5 SOLUTION RESPIRATORY (INHALATION) at 19:37

## 2023-05-24 RX ADMIN — POTASSIUM CHLORIDE 20 MEQ: 1500 TABLET, EXTENDED RELEASE ORAL at 11:39

## 2023-05-24 RX ADMIN — HEPARIN SODIUM 5000 UNITS: 10000 INJECTION, SOLUTION INTRAVENOUS; SUBCUTANEOUS at 08:12

## 2023-05-24 RX ADMIN — BUDESONIDE 1 MG: 0.5 INHALANT ORAL at 09:15

## 2023-05-24 RX ADMIN — BUDESONIDE 1 MG: 0.5 INHALANT ORAL at 19:49

## 2023-05-24 RX ADMIN — LEVALBUTEROL HYDROCHLORIDE 1.25 MG: 1.25 SOLUTION RESPIRATORY (INHALATION) at 19:37

## 2023-05-24 RX ADMIN — PREDNISONE 40 MG: 20 TABLET ORAL at 16:59

## 2023-05-24 RX ADMIN — LEVALBUTEROL HYDROCHLORIDE 1.25 MG: 1.25 SOLUTION RESPIRATORY (INHALATION) at 11:19

## 2023-05-24 RX ADMIN — LEVALBUTEROL HYDROCHLORIDE 0.63 MG: 0.63 SOLUTION RESPIRATORY (INHALATION) at 07:34

## 2023-05-24 RX ADMIN — PIPERACILLIN AND TAZOBACTAM 3.38 G: 3; .375 INJECTION, POWDER, LYOPHILIZED, FOR SOLUTION INTRAVENOUS at 10:25

## 2023-05-24 RX ADMIN — SODIUM CHLORIDE SOLN NEBU 3% 3 ML: 3 NEBU SOLN at 07:33

## 2023-05-24 RX ADMIN — PANTOPRAZOLE SODIUM 40 MG: 40 INJECTION, POWDER, FOR SOLUTION INTRAVENOUS at 13:19

## 2023-05-24 RX ADMIN — AMLODIPINE BESYLATE 10 MG: 5 TABLET ORAL at 08:12

## 2023-05-24 ASSESSMENT — ACTIVITIES OF DAILY LIVING (ADL)
ADLS_ACUITY_SCORE: 47
ADLS_ACUITY_SCORE: 49
ADLS_ACUITY_SCORE: 47
ADLS_ACUITY_SCORE: 47
ADLS_ACUITY_SCORE: 49
ADLS_ACUITY_SCORE: 49
ADLS_ACUITY_SCORE: 47
ADLS_ACUITY_SCORE: 49
ADLS_ACUITY_SCORE: 47
ADLS_ACUITY_SCORE: 49
ADLS_ACUITY_SCORE: 47
ADLS_ACUITY_SCORE: 49

## 2023-05-24 NOTE — PLAN OF CARE
"  Problem: Plan of Care - These are the overarching goals to be used throughout the patient stay.    Goal: Plan of Care Review  Description: The Plan of Care Review/Shift note should be completed every shift.  The Outcome Evaluation is a brief statement about your assessment that the patient is improving, declining, or no change.  This information will be displayed automatically on your shift note.  Outcome: Progressing  Goal: Patient-Specific Goal (Individualized)  Description: You can add care plan individualizations to a care plan. Examples of Individualization might be:  \"Parent requests to be called daily at 9am for status\", \"I have a hard time hearing out of my right ear\", or \"Do not touch me to wake me up as it startles me\".  Outcome: Progressing  Goal: Absence of Hospital-Acquired Illness or Injury  Outcome: Progressing  Intervention: Identify and Manage Fall Risk  Recent Flowsheet Documentation  Taken 5/24/2023 0056 by Marjorie Romero RN  Safety Promotion/Fall Prevention:    activity supervised    increase visualization of patient    increased rounding and observation    room near nurse's station    safety round/check completed  Intervention: Prevent Skin Injury  Recent Flowsheet Documentation  Taken 5/24/2023 0056 by Marjorie Romero RN  Body Position:    turned    right  Goal: Optimal Comfort and Wellbeing  Outcome: Progressing  Goal: Readiness for Transition of Care  Outcome: Progressing     Problem: Risk for Delirium  Goal: Optimal Coping  Outcome: Progressing  Goal: Improved Behavioral Control  Outcome: Progressing  Intervention: Minimize Safety Risk  Recent Flowsheet Documentation  Taken 5/24/2023 0056 by Marjorie Romero RN  Enhanced Safety Measures: family to remain at bedside  Goal: Improved Attention and Thought Clarity  Outcome: Progressing  Goal: Improved Sleep  Outcome: Progressing     Problem: Gas Exchange Impaired  Goal: Optimal Gas Exchange  Outcome: Progressing  Intervention: Optimize " Oxygenation and Ventilation  Recent Flowsheet Documentation  Taken 5/24/2023 0056 by Marjorie Romero RN  Head of Bed (HOB) Positioning: HOB at 30-45 degrees   Goal Outcome Evaluation:       Pt had difficulty sleeping through the night, no complaints of pain. VSS. Tolerated 02. CTM.

## 2023-05-24 NOTE — PROGRESS NOTES
[facility]    Medicine Progress Note - Hospitalist Service    Date of Admission:  5/21/2023    Assessment & Plan   Veronika Hernandez is a 87 year old female admitted on 5/21/2023. She has history of allergic bronchopulmonary aspergillosis, asthma, HTN, TBI, wheelchair bound, ventral hernia, presents for evaluation of hypoxia.       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  -Noting black-colored phlegm which is typical of her ABPA exacerbations  -Here with hypoxia, hypercarbia, tachypnea and increased work of breathing, placed on BiPAP--now weaned to face mask at 4 liters am 5/23 and bipap prn  -nebs, and pulmicort nebs as well  -iv steroids  -iv Zosyn  -CT chest done-- Pronounced lung disease substantially worse on the right, characterized by innumerable small inflammatory appearing pulmonary nodules and subpleural zones of denser consolidation as well as mild inflammatory bronchial wall thickening. Findings   nonspecific but atypical infection most likely. Tiny right pleural effusion.  -pulmonary following     2.Elevated BNP  -No known history of CHF, BNP elevated here 3.2k  -Chest x-ray showing possible pulmonary vascular congestion, possible pleural effusion  -Echocardiogram-5/22/23  1.Left ventricular size, wall motion and function are normal. The ejection  fraction is 60-65%.  2.Normal right ventricle size and systolic function.  3.There is moderate to mod-severe (2-3+) tricuspid regurgitation. IVC not  interrogated.  4.Aortic valve not well interrogated, appears to be trileaflet and calcified,  suspect at least mild aortic stenosis, would suggest repeat study with  attention to the aortic valve.  5.Right ventricular systolic pressure is  elevated, consistent with mild  pulmonary hypertension.  There is no comparison study available.    -Hold on diuresis  -Tele     3.?Runs of SVT  -Runs of narrow complex tachycardia noted   -Tele monitoring  -EKG if rhythm change     4.HTN  -restarted amlodipine      5.HypoNa  -resolved  -trend     6.Cachexia  -start clears               Diet: Combination Diet Pureed Diet (level 4); Mildly Thick (level 2)    DVT Prophylaxis: Heparin SQ  Pham Catheter: Not present  Lines: None     Cardiac Monitoring: ACTIVE order. Indication: resp issues, bipap  Code Status: Full Code      Clinically Significant Risk Factors        # Hypokalemia: Lowest K = 3.3 mmol/L in last 2 days, will replace as needed           # Hypertension: Noted on problem list                 Disposition Plan     Expected Discharge Date: 05/29/2023        Discharge Comments: possible TCU   PT/OT to see          Erika Brar MD  Hospitalist Service  [facility]  Securely message with [employer] (more info)  Text page via [employer] Paging/Directory   ______________________________________________________________________    Interval History   Just off bipap this am, still sob with exertion, productive cough, no f/c, no cp, no n/v    Addressed the concerns from pt and family at bedside    Physical Exam   Vital Signs: Temp: 97.8  F (36.6  C) Temp src: Oral BP: 135/65 Pulse: 88   Resp: 20 SpO2: 94 % O2 Device: Nasal cannula Oxygen Delivery: 2 LPM  Weight: 97 lbs .04 oz    General.  Awake alert in resp distress. On NC  HEENT.  Pupils equal round react to light, anicteric, EOM intact.  Neck supple no JVD.  CVS regular rhythm no murmur gallops.  Lungs.  wheezing to auscultation bilateral   Abdomen.  Soft nontender bowel sounds present.  Extremities.  No edema no calf tenderness.  Neurological.  Grossly intact  Skin no rash. No pallor.  Psych. Normal mood.     Medical Decision Making       60 MINUTES SPENT BY ME on the date of service doing chart  review, history, exam, documentation & further activities per the note.      Data     I have personally reviewed the following data over the past 24 hrs:    9.0  \   11.9   / 401     142 101 19.3 /  165 (H)   3.3 (L) 31 (H) 0.35 (L) \       Procal: N/A CRP: N/A Lactic Acid: 1.2         Imaging results reviewed over the past 24 hrs:   No results found for this or any previous visit (from the past 24 hour(s)).

## 2023-05-24 NOTE — PLAN OF CARE
Problem: Gas Exchange Impaired  Goal: Optimal Gas Exchange  Outcome: Progressing  Intervention: Optimize Oxygenation and Ventilation  Recent Flowsheet Documentation  Taken 5/23/2023 1627 by Stephanie Duncan RN  Head of Bed (HOB) Positioning: HOB at 30-45 degrees     Pt tolerating 2L oxygen, O2 sats about 90-93%. Right side of lung mostly coarse, occasional expiratory wheezing.  Sputum sample sent, result pending.    Pt prefers laying on right side, using pillow to assist with repositioning.     Incont of urine, purewick in place. Voided 300ml this evening. Son at bedside and attentive.

## 2023-05-24 NOTE — PLAN OF CARE
Problem: Plan of Care - These are the overarching goals to be used throughout the patient stay.    Goal: Plan of Care Review  Description: The Plan of Care Review/Shift note should be completed every shift.  The Outcome Evaluation is a brief statement about your assessment that the patient is improving, declining, or no change.  This information will be displayed automatically on your shift note.  5/24/2023 1725 by Malena Burden RN  Outcome: Progressing  5/24/2023 1724 by Malena Burden RN  Outcome: Progressing     Problem: Plan of Care - These are the overarching goals to be used throughout the patient stay.    Goal: Absence of Hospital-Acquired Illness or Injury  Intervention: Prevent Skin Injury  Recent Flowsheet Documentation  Taken 5/24/2023 1630 by Malena Burden RN  Body Position: supine, head elevated  Taken 5/24/2023 0833 by Malena Burden RN  Body Position:   turned   supine, head elevated  Taken 5/24/2023 0810 by Malena Burden RN  Body Position: log-rolled     Problem: Gas Exchange Impaired  Goal: Optimal Gas Exchange  5/24/2023 1725 by Malena Burden RN  Outcome: Progressing  5/24/2023 1724 by Malena Burden RN  Outcome: Progressing  Intervention: Optimize Oxygenation and Ventilation  Recent Flowsheet Documentation  Taken 5/24/2023 1630 by Malena Burden RN  Head of Bed (HOB) Positioning: HOB at 60 degrees  Taken 5/24/2023 0833 by Malena Burden RN  Head of Bed (HOB) Positioning: HOB at 60 degrees   Goal Outcome Evaluation:     Patient admitted for exasperation of her chronic lung disease (Allergic Bronchopulmonary Aspergillosis). She continues to maintain o2 in mid 90's on 2 liters of o2 per nasal canula. Developed expiratory wheezing after her nebulizer treatment at 1600. She was changed from IV steroid to oral at 1700. Her appetite was minimal throughout the day. She did have an episode of fecal incontinence today and is using a purewick for urinary incontinence. Requires repositioning every 2  hours.
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
## 2023-05-24 NOTE — TREATMENT PLAN
RCAT Treatment Plan    Patient Score: 11  Patient Acuity: 3    Clinical Indication for Therapy: bronchospasm, asthma, ABPA, PNA    Therapy Ordered: pulmicort neb BID, atrovent QID, levalbuterol Q4 PRN, 3% saline neb BID     Assessment Summary: Patient is a nonsmoker with pulmonary history of asthma and ABPA, who uses home nebs BID and home oxygen. Patient and son are uncertain of how much home oxygen patient uses. Patient remains on 2-4 lpm nasal cannula. Desaturation noted with eating. BBS coarse crackles with expiratory wheezes. BBS improved post neb treatments. Effective, productive cough. Patient is wheel chair bound. Will change levalbuterol to adult dose QID and continue other nebs as scheduled. Will also add flutter valve QID for bronchial hygiene. Pulmonary is following. RT to re-evaluate within 72 hours to determine effectiveness of therapy.       Sasha Oh, RT  5/24/2023

## 2023-05-24 NOTE — PROGRESS NOTES
SPIRITUAL HEALTH SERVICES Progress Note  [facility], P1    Saw pt Veronika Hernandez per consult order.    Patient/Family Understanding of Illness and Goals of Care - Veronika and her daughter shared about her medical condition, especially as it relates to her ongoing respiratory challenges. Veronika shared about her life long issues with asthma and then had many years of effective treatment.     Distress and Loss - Ongoing health issues, otherwise no other distress noted at this time.     Strengths, Coping, and Resources - She shared some life history, notably that she and her  have been  for 70 years. Her daughter was present offering support.     Meaning, Beliefs, and Spirituality - Patient comes from [place] [employer] background and derives meaning, purpose, and comfort from deejay. She had received communion earlier from [employer] .     Plan of Care - A  will continue to visit as able or per request by patient/family/staff.      Joseluis Jackman MDiv, [facility]  /Manager Spiritual Health Services  [phone]

## 2023-05-24 NOTE — PROGRESS NOTES
"Care Management Follow Up    Length of Stay (days): 3    Expected Discharge Date: 05/29/2023     Concerns to be Addressed:     Medical progression and discharge planning  Patient plan of care discussed at interdisciplinary rounds: Yes    Anticipated Discharge Disposition:  TBD     Anticipated Discharge Services:  TBD  Anticipated Discharge DME:  TBD    Patient/family educated on Medicare website which has current facility and service quality ratings:  NA  Education Provided on the Discharge Plan:  Per Team  Patient/Family in Agreement with the Plan:  Per Team    Referrals Placed by CM/SW:  MICK[facility]  Private pay costs discussed: Not applicable    Additional Information:  RNCM reviewed chart.  Patient continues to be on IV antibiotics and oxygen.  Pulmonary following.   Per MD note 5/24: \"BiPAP--now weaned to face mask at 4 liters am 5/23 and bipap prn\"    Speech therapist recommends TCU due to below baseline swallow function and altered diet likely at discharge.   PT/OT have not been consulted.  RNCM message MD about PT/OT consult.     RNCM awaiting recommendations from PT/OT.        CM will continue to follow progression of care and aide in discharge planning as needed.     Felsiha Cason RN        "

## 2023-05-25 ENCOUNTER — APPOINTMENT (OUTPATIENT)
Dept: SPEECH THERAPY | Facility: HOSPITAL | Age: 88
DRG: 177 | End: 2023-05-25
Payer: COMMERCIAL

## 2023-05-25 ENCOUNTER — APPOINTMENT (OUTPATIENT)
Dept: RADIOLOGY | Facility: HOSPITAL | Age: 88
DRG: 177 | End: 2023-05-25
Attending: INTERNAL MEDICINE
Payer: COMMERCIAL

## 2023-05-25 LAB
PLATELET # BLD AUTO: 408 10E3/UL (ref 150–450)
POTASSIUM SERPL-SCNC: 3.5 MMOL/L (ref 3.4–5.3)

## 2023-05-25 PROCEDURE — 250N000011 HC RX IP 250 OP 636: Performed by: INTERNAL MEDICINE

## 2023-05-25 PROCEDURE — 84132 ASSAY OF SERUM POTASSIUM: CPT | Performed by: INTERNAL MEDICINE

## 2023-05-25 PROCEDURE — 94640 AIRWAY INHALATION TREATMENT: CPT

## 2023-05-25 PROCEDURE — 250N000013 HC RX MED GY IP 250 OP 250 PS 637: Performed by: INTERNAL MEDICINE

## 2023-05-25 PROCEDURE — 120N000001 HC R&B MED SURG/OB

## 2023-05-25 PROCEDURE — 99233 SBSQ HOSP IP/OBS HIGH 50: CPT | Performed by: INTERNAL MEDICINE

## 2023-05-25 PROCEDURE — 999N000150 HC STATISTIC PT MED CONFERENCE < 30 MIN

## 2023-05-25 PROCEDURE — 99232 SBSQ HOSP IP/OBS MODERATE 35: CPT | Performed by: INTERNAL MEDICINE

## 2023-05-25 PROCEDURE — 999N000157 HC STATISTIC RCP TIME EA 10 MIN

## 2023-05-25 PROCEDURE — 85049 AUTOMATED PLATELET COUNT: CPT | Performed by: INTERNAL MEDICINE

## 2023-05-25 PROCEDURE — 272N000202 HC AEROBIKA WITH MANOMETER

## 2023-05-25 PROCEDURE — 250N000009 HC RX 250: Performed by: INTERNAL MEDICINE

## 2023-05-25 PROCEDURE — C9113 INJ PANTOPRAZOLE SODIUM, VIA: HCPCS | Performed by: INTERNAL MEDICINE

## 2023-05-25 PROCEDURE — 71045 X-RAY EXAM CHEST 1 VIEW: CPT

## 2023-05-25 PROCEDURE — 250N000011 HC RX IP 250 OP 636: Performed by: HOSPITALIST

## 2023-05-25 PROCEDURE — 92526 ORAL FUNCTION THERAPY: CPT | Mod: GN

## 2023-05-25 PROCEDURE — 250N000009 HC RX 250: Performed by: HOSPITALIST

## 2023-05-25 PROCEDURE — 94799 UNLISTED PULMONARY SVC/PX: CPT

## 2023-05-25 PROCEDURE — 36415 COLL VENOUS BLD VENIPUNCTURE: CPT | Performed by: INTERNAL MEDICINE

## 2023-05-25 PROCEDURE — 250N000012 HC RX MED GY IP 250 OP 636 PS 637: Performed by: INTERNAL MEDICINE

## 2023-05-25 PROCEDURE — 94640 AIRWAY INHALATION TREATMENT: CPT | Mod: 76

## 2023-05-25 RX ORDER — PREDNISONE 20 MG/1
40 TABLET ORAL DAILY
Status: DISCONTINUED | OUTPATIENT
Start: 2023-05-26 | End: 2023-05-27 | Stop reason: HOSPADM

## 2023-05-25 RX ORDER — PREDNISONE 20 MG/1
20 TABLET ORAL DAILY
Status: DISCONTINUED | OUTPATIENT
Start: 2023-06-05 | End: 2023-05-27 | Stop reason: HOSPADM

## 2023-05-25 RX ORDER — PREDNISONE 5 MG/1
10 TABLET ORAL DAILY
Status: DISCONTINUED | OUTPATIENT
Start: 2023-06-10 | End: 2023-05-27 | Stop reason: HOSPADM

## 2023-05-25 RX ADMIN — SODIUM CHLORIDE SOLN NEBU 3% 3 ML: 3 NEBU SOLN at 08:06

## 2023-05-25 RX ADMIN — BUDESONIDE 1 MG: 0.5 INHALANT ORAL at 08:19

## 2023-05-25 RX ADMIN — AMOXICILLIN AND CLAVULANATE POTASSIUM 1 TABLET: 875; 125 TABLET, FILM COATED ORAL at 20:19

## 2023-05-25 RX ADMIN — IPRATROPIUM BROMIDE 0.5 MG: 0.5 SOLUTION RESPIRATORY (INHALATION) at 11:05

## 2023-05-25 RX ADMIN — BUDESONIDE 1 MG: 0.5 INHALANT ORAL at 19:29

## 2023-05-25 RX ADMIN — IPRATROPIUM BROMIDE 0.5 MG: 0.5 SOLUTION RESPIRATORY (INHALATION) at 16:46

## 2023-05-25 RX ADMIN — LEVALBUTEROL HYDROCHLORIDE 1.25 MG: 1.25 SOLUTION RESPIRATORY (INHALATION) at 11:05

## 2023-05-25 RX ADMIN — AMOXICILLIN AND CLAVULANATE POTASSIUM 1 TABLET: 875; 125 TABLET, FILM COATED ORAL at 14:35

## 2023-05-25 RX ADMIN — LEVALBUTEROL HYDROCHLORIDE 1.25 MG: 1.25 SOLUTION RESPIRATORY (INHALATION) at 08:07

## 2023-05-25 RX ADMIN — AMLODIPINE BESYLATE 10 MG: 5 TABLET ORAL at 09:52

## 2023-05-25 RX ADMIN — HEPARIN SODIUM 5000 UNITS: 10000 INJECTION, SOLUTION INTRAVENOUS; SUBCUTANEOUS at 09:53

## 2023-05-25 RX ADMIN — PIPERACILLIN AND TAZOBACTAM 3.38 G: 3; .375 INJECTION, POWDER, LYOPHILIZED, FOR SOLUTION INTRAVENOUS at 09:53

## 2023-05-25 RX ADMIN — LEVALBUTEROL HYDROCHLORIDE 1.25 MG: 1.25 SOLUTION RESPIRATORY (INHALATION) at 16:46

## 2023-05-25 RX ADMIN — PREDNISONE 40 MG: 20 TABLET ORAL at 09:52

## 2023-05-25 RX ADMIN — HEPARIN SODIUM 5000 UNITS: 10000 INJECTION, SOLUTION INTRAVENOUS; SUBCUTANEOUS at 20:19

## 2023-05-25 RX ADMIN — SODIUM CHLORIDE SOLN NEBU 3% 3 ML: 3 NEBU SOLN at 19:29

## 2023-05-25 RX ADMIN — IPRATROPIUM BROMIDE 0.5 MG: 0.5 SOLUTION RESPIRATORY (INHALATION) at 19:30

## 2023-05-25 RX ADMIN — IPRATROPIUM BROMIDE 0.5 MG: 0.5 SOLUTION RESPIRATORY (INHALATION) at 08:06

## 2023-05-25 RX ADMIN — PIPERACILLIN AND TAZOBACTAM 3.38 G: 3; .375 INJECTION, POWDER, LYOPHILIZED, FOR SOLUTION INTRAVENOUS at 01:50

## 2023-05-25 RX ADMIN — PANTOPRAZOLE SODIUM 40 MG: 40 INJECTION, POWDER, FOR SOLUTION INTRAVENOUS at 14:27

## 2023-05-25 RX ADMIN — LEVALBUTEROL HYDROCHLORIDE 1.25 MG: 1.25 SOLUTION RESPIRATORY (INHALATION) at 19:29

## 2023-05-25 ASSESSMENT — ACTIVITIES OF DAILY LIVING (ADL)
ADLS_ACUITY_SCORE: 49
ADLS_ACUITY_SCORE: 49
ADLS_ACUITY_SCORE: 45
ADLS_ACUITY_SCORE: 49
ADLS_ACUITY_SCORE: 45
ADLS_ACUITY_SCORE: 45
ADLS_ACUITY_SCORE: 49
ADLS_ACUITY_SCORE: 49
ADLS_ACUITY_SCORE: 45
ADLS_ACUITY_SCORE: 45

## 2023-05-25 NOTE — PLAN OF CARE
Problem: Plan of Care - These are the overarching goals to be used throughout the patient stay.    Goal: Optimal Comfort and Wellbeing  Outcome: Progressing  Goal: Readiness for Transition of Care  Outcome: Progressing     Problem: Risk for Delirium  Goal: Optimal Coping  Outcome: Progressing  Goal: Improved Behavioral Control  Outcome: Progressing  Intervention: Minimize Safety Risk  Recent Flowsheet Documentation  Taken 5/24/2023 2000 by Yuli Maria RN  Enhanced Safety Measures: family to remain at bedside     Problem: Gas Exchange Impaired  Goal: Optimal Gas Exchange  Outcome: Progressing  Intervention: Optimize Oxygenation and Ventilation  Recent Flowsheet Documentation  Taken 5/24/2023 2000 by Yuli Maria RN  Head of Bed (HOB) Positioning: HOB at 60 degrees   Goal Outcome Evaluation:             VSS overnight. No complaints of pain. Pure wick remains in place. Patient comfortable in bed, repositioning offered but was declined as family is repositioning patient. Pillows used to separate bony prominences. 2L nasal cannula remains on patient. Nasal cannula frequently comes off patient when sleeping. Patient desats to 80's when this occurs. Nasal cannula reapplied quickly when this occurs.     Yuli Maria RN .

## 2023-05-25 NOTE — PROGRESS NOTES
[facility]    Medicine Progress Note - Hospitalist Service    Date of Admission:  5/21/2023    Assessment & Plan   Veronika Hernandez is a 87 year old female admitted on 5/21/2023. She has history of allergic bronchopulmonary aspergillosis, asthma, HTN, TBI, wheelchair bound, ventral hernia, presents for evaluation of hypoxia.       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  -Noting black-colored phlegm which is typical of her ABPA exacerbations  -Here with hypoxia, hypercarbia, tachypnea and increased work of breathing, placed on BiPAP--now weaned to face mask at 4 liters am 5/23 and bipap prn  -nebs, and pulmicort nebs as well  -iv steroids  -iv Zosyn  -CT chest done-- Pronounced lung disease substantially worse on the right, characterized by innumerable small inflammatory appearing pulmonary nodules and subpleural zones of denser consolidation as well as mild inflammatory bronchial wall thickening. Findings   nonspecific but atypical infection most likely. Tiny right pleural effusion.  -pulmonary following     2.Elevated BNP  -No known history of CHF, BNP elevated here 3.2k  -Chest x-ray showing possible pulmonary vascular congestion, possible pleural effusion  -Echocardiogram-5/22/23  1.Left ventricular size, wall motion and function are normal. The ejection  fraction is 60-65%.  2.Normal right ventricle size and systolic function.  3.There is moderate to mod-severe (2-3+) tricuspid regurgitation. IVC not  interrogated.  4.Aortic valve not well interrogated, appears to be trileaflet and calcified,  suspect at least mild aortic stenosis, would suggest repeat study with  attention to the aortic valve.  5.Right ventricular systolic pressure is  elevated, consistent with mild  pulmonary hypertension.  There is no comparison study available.    -Hold on diuresis  -Tele     3.?Runs of SVT  -Runs of narrow complex tachycardia noted   -Tele monitoring  -EKG if rhythm change     4.HTN  -restarted amlodipine      5.HypoNa  -resolved  -trend     6.Cachexia  -Encourage oral nutrition        Diet: Combination Diet Pureed Diet (level 4); Mildly Thick (level 2)    DVT Prophylaxis: Heparin SQ  Phma Catheter: Not present  Lines: None     Cardiac Monitoring: None  Code Status: Full Code      Clinically Significant Risk Factors        # Hypokalemia: Lowest K = 3.3 mmol/L in last 2 days, will replace as needed           # Hypertension: Noted on problem list                 Disposition Plan     Expected Discharge Date: 05/29/2023    Discharge Delays: OT New Consult needed  PT New Consult needed    Discharge Comments: possible TCU   PT/OT to see          Erika Brar MD  Hospitalist Service  [facility]  Securely message with [employer] (more info)  Text page via [employer] Paging/Directory   ______________________________________________________________________    Interval History   Feeling better, still sob, dry cough, no f/c, no cp    Physical Exam   Vital Signs: Temp: 98.1  F (36.7  C) Temp src: Oral BP: 130/63 Pulse: 77   Resp: 20 SpO2: 95 % O2 Device: Nasal cannula Oxygen Delivery: 2 LPM  Weight: 97 lbs .04 oz    General.  Awake alert in acute distress. Receiving neb treatments now  HEENT.  Pupils equal round react to light, anicteric, EOM intact.  Neck supple no JVD.  CVS regular rhythm no murmur gallops.  Lungs.  wheezing to auscultation bilateral.  Abdomen.  Soft nontender bowel sounds present.  Extremities.  No edema no calf tenderness.  Neurological.  General weakness  Skin no rash. No pallor.  Psych. Normal mood.     Medical Decision Making       51 MINUTES SPENT BY ME on the date of service doing chart review, history, exam, documentation &  further activities per the note.      Data     I have personally reviewed the following data over the past 24 hrs:    N/A  \   N/A   / 408     N/A N/A N/A /  N/A   3.5 N/A N/A \       Procal: N/A CRP: N/A Lactic Acid: 1.6         Imaging results reviewed over the past 24 hrs:   Recent Results (from the past 24 hour(s))   XR Chest 1 View    Narrative    EXAM: XR CHEST 1 VIEW  LOCATION: [facility]  DATE/TIME: 5/25/2023 6:46 AM CDT    INDICATION: follow up pneumonia  COMPARISON: CT CXR 05/21/2023      Impression    IMPRESSION: Decrease right upper lobe apical airspace opacities. Biapical scarring. Heart size normal. No pleural effusion. Osteoarthrosis right shoulder. Lumbar spine fusion. Oral contrast in large bowel.

## 2023-05-25 NOTE — PLAN OF CARE
Goal Outcome Evaluation:      Plan of Care Reviewed With: patient    Overall Patient Progress: improvingOverall Patient Progress: improving     Patient asleep all shift. Daughter in room. Patient arouses to voice, denies pain. Course crackles on right side lungs. Infrequent congested cough. Daughter is hoping to have patient discharge home with family.

## 2023-05-25 NOTE — PROGRESS NOTES
RESPIRATORY CARE NOTE:    PT is currently on 2L NC with an SpO2 of 93%.  Breathing pattern no shortness of breath Breath sounds diminished with exp wheezes Cough type frequent, dry Sputum Type none noted  Pulmicort x1, Atrovent x2, Xopenex x2, Nebusal x1 nebulizer given.  Flutter valve x1 given.  PT encouraged to do incentive spirometry on her own.  PT tolerated treatments well.  RT will continue to follow.      /74 (BP Location: Left arm)   Pulse 79   Temp 97.6  F (36.4  C) (Oral)   Resp 20   Ht 1.524 m (5')   Wt 44 kg (97 lb)   SpO2 93%   BMI 18.94 kg/m      Haroldo Malloy, RT  5/25/2023

## 2023-05-25 NOTE — PROGRESS NOTES
Care Management Follow Up    Length of Stay (days): 4    Expected Discharge Date: 05/29/2023     Concerns to be Addressed:     Medical progression and discharge planning  Patient plan of care discussed at interdisciplinary rounds: Yes    Anticipated Discharge Disposition:  TBD     Anticipated Discharge Services:  TBD  Anticipated Discharge DME:  TBD    Patient/family educated on Medicare website which has current facility and service quality ratings:  NA  Education Provided on the Discharge Plan:  Per Team  Patient/Family in Agreement with the Plan:  Per Team    Referrals Placed by CM/SW:  NA  Private pay costs discussed: Not applicable    Additional Information:  RNCM reviewed chart.  Patient continues to be on IV antibiotics and oxygen (uses home nebs and home oxygen).       Speech therapist recommends TCU due to below baseline swallow function and altered diet likely at discharge.     PT/OT have not been consulted. RNCM message MD about PT/OT consult, plan to place order.      Need PT/OT rec to determine TCU needs.  RNCM awaiting recommendations from PT/OT.    Update:  Per PT, patient is declining PT/OT/Home care.  Family will provide support at home.    CM updated MD.  MD is ok with patient going home as long as patient is getting stronger.       CM will continue to follow progression of care and aide in discharge planning as needed.     Felisha Cason RN

## 2023-05-25 NOTE — PROGRESS NOTES
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 Aldana), asthma, wheelchair bound, presented with worsening SOB. Treated for asthma exacerbation and possible SOB. Requiring NIPPV initially; doing much better and currently on 2Lpm.   Reviewed the CT chest and prior pulmonary notes from .   Discussed importance of outpatient follow up and re-evaluation by asthma specialist for biologic therapy.    Recommendations:  - titrate FiO2 for goal SpO2 92% or greater, avoid hyperoxia. Home O2 eval prior to discharge  - encourage OOB, PT/OT, push IS when able  - stop zosyn. Change to PO augmentin for aspiration for 4 more days  - prednisone taper ordered  - continue nebs  - resume PTA inhalers upon discharge  - follow up with  pulmonary (Dr. Aldana) shortly after discharge. She is going to call to set up an appointment.    No further recommendations, will sign off. Hopefully she can go home soon.  Please call with additional questions.     Alton (Darrius) MD Gabe  [facility]  Pulmonary & Critical Care  Pager [phone]  Clinic [phone]  Fax [phone]     Subjective/Interim Events:   Feels OK. Still having some SOB.  Not coughing up much phlegm.   Grand daughter at bedside.  On 2Lpm O2 with SpO2 mid 90s      Medications:       - MEDICATION INSTRUCTIONS -         amLODIPine  10 mg Oral Daily     amoxicillin-clavulanate  1 tablet Oral Q12H [facility] (08/20)     aspirin  81 mg Oral Daily     budesonide  1 mg Nebulization BID     heparin ANTICOAGULANT  5,000 Units Subcutaneous Q12H     ipratropium  0.5 mg Nebulization 4x daily     levalbuterol  1.25 mg Nebulization 4x Daily     pantoprazole  40 mg Intravenous Q24H     predniSONE  40 mg Oral Daily    Followed by     [START ON 5/30/2023] predniSONE  30 mg Oral Daily     Followed by     [START ON 2023] predniSONE  20 mg Oral Daily    Followed by     [START ON 2023] predniSONE  10 mg Oral Daily     sodium chloride  3 mL Nebulization BID     sodium chloride (PF)  3 mL Intracatheter Q8H         Exam/Data:   Vitals  /74 (BP Location: Left arm)   Pulse 79   Temp 97.6  F (36.4  C) (Oral)   Resp 20   Ht 1.524 m (5')   Wt 44 kg (97 lb)   SpO2 93%   BMI 18.94 kg/m       I/O last 3 completed shifts:  In: 477 [P.O.:477]  Out: 1300 [Urine:1300]  Weight change:   [unfilled]  Resp: 20      EXAM:  Physical Exam  Gen: awake, alert, oriented, no distress  HEENT: NT, no MARIA DE JESUS  CV: RRR, no m/g/r  Resp: diminished bilaterally. No wheezing.   Abd: soft, nontender, BS+  Skin: no rashes or lesions  Ext: no edema  Neuro: PERRL, nonfocal exam    ROS:  A 10-system review was obtained and is negative with the exception of the symptoms noted above.    DATA:    PFT DATA   Unable to find PFT's      IMAGING:   Echocardiogram Complete    Result Date: 2023  [id number]^[place] [address]  Name: GAVINO DHILLON MRN: [id number] : 1935 Study Date: 2023 09:55 AM Age: 87 yrs Gender: Female Patient Location: [facility] Reason For Study: CHF Ordering Physician: YAN EISENBERG Performed By: TERRY  BSA: 1.5 m2 Height: 64 in Weight: 100 lb HR: 100 BP: 134/61 mmHg ______________________________________________________________________________ Procedure Complete Portable Echo Adult. There is no comparison study available. ______________________________________________________________________________ Interpretation Summary  1.Left ventricular size, wall motion and function are normal. The ejection fraction is 60-65%. 2.Normal right ventricle size and systolic function. 3.There is moderate to mod-severe (2-3+) tricuspid regurgitation. IVC not interrogated. 4.Aortic valve not well interrogated, appears to be trileaflet and  calcified, suspect at least mild aortic stenosis, would suggest repeat study with attention to the aortic valve. 5.Right ventricular systolic pressure is elevated, consistent with mild pulmonary hypertension. There is no comparison study available. ______________________________________________________________________________ I      WMSI = 1.00     % Normal = 100  X - Cannot   0 -                      (2) - Mildly 2 -          Segments  Size Interpret    Hyperkinetic 1 - Normal  Hypokinetic  Hypokinetic  1-2     small                                                    7 -          3-5    moderate 3 - Akinetic 4 -          5 -         6 - Akinetic Dyskinetic   6-14    large              Dyskinetic   Aneurysmal  w/scar       w/scar       15-16   diffuse  Left Ventricle Left ventricular size, wall motion and function are normal. The ejection fraction is 60-65%. There is normal left ventricular wall thickness. Left ventricular diastolic function is normal. No regional wall motion abnormalities noted.  Right Ventricle Normal right ventricle size and systolic function. TAPSE is normal, which is consistent with normal right ventricular systolic function.  Atria The left atrium is mildly dilated. Right atrial size is normal. There is no color Doppler evidence of an atrial shunt.  Mitral Valve There is mild to moderate mitral annular calcification. There is no mitral regurgitation noted. There is no mitral valve stenosis.  Tricuspid Valve The tricuspid valve is not well visualized, but is grossly normal. There is moderate to mod-severe (2-3+) tricuspid regurgitation. Right ventricular systolic pressure is elevated, consistent with mild pulmonary hypertension. There is no tricuspid stenosis.  Aortic Valve Aortic valve not well interrogated, appears to be trileaflet and calcified, suspect at least mild aortic stenosis, would suggest repeat study with attention to the aortic valve.  Pulmonic Valve The pulmonic valve is not well  seen, but is grossly normal. This degree of valvular regurgitation is within normal limits. There is no pulmonic valvular stenosis.  Vessels The aorta root is normal. Normal size ascending aorta. IVC diameter <2.1 cm collapsing >50% with sniff suggests a normal RA pressure of 3 mmHg.  Pericardium There is no pericardial effusion.  Rhythm Sinus rhythm was noted.  ______________________________________________________________________________ MMode/2D Measurements & Calculations RV Base: 3.9 cm TAPSE: 2.1 cm  Time Measurements MM HR: 93.0 BPM  Doppler Measurements & Calculations LV V1 max PG: 3.0 mmHg LV V1 max: 86.8 cm/sec LV V1 VTI: 18.3 cm PA acc time: 0.10 sec TR max [place]: 293.0 cm/sec TR max P.3 mmHg RV S [place]: 14.6 cm/sec  ______________________________________________________________________________ Report approved by: Pino Chilel 2023 01:18 PM       CT Chest w/o Contrast    Result Date: 2023  EXAM: CT CHEST W/O CONTRAST LOCATION: [facility] DATE/TIME: 2023 10:53 PM CDT INDICATION: hypoxia, allergic pulmonary aspergillosis COMPARISON: Abdominal CT 2019 TECHNIQUE: CT chest without IV contrast. Multiplanar reformats were obtained. Dose reduction techniques were used. CONTRAST: None. FINDINGS: LUNGS AND PLEURA: Prominent lung disease characterized by innumerable small irregular, inflammatory appearing pulmonary nodules predominantly involving right lung with zones of coalescing peripheral consolidation. Trace volume right pleural effusion. Involvement within left lung is substantially less pronounced though similar tiny inflammatory nodules are present at both lung bases and lung apex. Mild diffuse inflammatory bronchial wall thickening. All these findings are new compared to prior exam. MEDIASTINUM/AXILLAE: Normal size heart. No significant adenopathy. CORONARY ARTERY CALCIFICATION: Moderate. UPPER ABDOMEN: No significant finding. MUSCULOSKELETAL:  Compression abnormalities lower thoracic spine similar to previous exam no suspicious bony lesion.. Previous vertebroplasty's and fusion upper lumbar spine.     IMPRESSION: 1.  Pronounced lung disease substantially worse on the right, characterized by innumerable small inflammatory appearing pulmonary nodules and subpleural zones of denser consolidation as well as mild inflammatory bronchial wall thickening. Findings nonspecific but atypical infection most likely. 2.  Tiny right pleural effusion.     XR Chest Port 1 View    Result Date: 5/21/2023  EXAM: XR CHEST PORT 1 VIEW LOCATION: [facility] DATE/TIME: 5/21/2023 7:32 PM CDT INDICATION: Shortness of breath. COMPARISON: 05/15/2021.     IMPRESSION: Mild diffuse interstitial change, favoring a mild pulmonary vascular congestion pattern. Scattered soft tissue densities throughout the right lung, which appears to represent progression of scarring in the right upper lobe, though superimposed acute infiltrate is not excluded. Opacity within the periphery of the right lung base is possibly more acute. Uncertain if this represents airspace consolidation and/or a small right pleural effusion. CT imaging may be helpful for further assessment. At a minimum, short-term follow-up chest radiograph is recommended to document stability or resolution of these findings. No pneumothorax. Heart size is normal. Atherosclerosis of the thoracic aorta. Postsurgical changes of the visualized lumbar spine. Vertebroplasty changes of the thoracolumbar junction, as well.

## 2023-05-25 NOTE — PROGRESS NOTES
Physical Therapy: Orders received. Chart reviewed and discussed with care team.? Physical Therapy not indicated due to patient family -granddaughter and daughter who is patient caregiver decline needs for PT/OT.Per daughter patient is close to her baseline and daughter feels confident re patient returning home.They declined services ie HHA ,home PT/OT?. Defer discharge recommendations to treatment team .?Anticipate discharge home with family as caregivers. Will complete orders.

## 2023-05-25 NOTE — PLAN OF CARE
Problem: Plan of Care - These are the overarching goals to be used throughout the patient stay.    Goal: Plan of Care Review  Description: The Plan of Care Review/Shift note should be completed every shift.  The Outcome Evaluation is a brief statement about your assessment that the patient is improving, declining, or no change.  This information will be displayed automatically on your shift note.  Outcome: Progressing     Problem: Gas Exchange Impaired  Goal: Optimal Gas Exchange  Outcome: Progressing   Goal Outcome Evaluation:    Patient continues on 2 liters of o2 per nasal cannula and is doing well, with o2 sats in the mid 90's. Patient was up to chair for approximately 3 hours and tolerated well, transported with sharifa lift.  I.V antibiotics changed to Oral antibiotics after 1000 dose of Zosyn. PT/OT eval ordered prior to discharged. Patient is using bed pan instead of pure wick today.     Malena Burden RN

## 2023-05-26 ENCOUNTER — APPOINTMENT (OUTPATIENT)
Dept: SPEECH THERAPY | Facility: HOSPITAL | Age: 88
DRG: 177 | End: 2023-05-26
Payer: COMMERCIAL

## 2023-05-26 LAB
BACTERIA BLD CULT: NO GROWTH
BACTERIA BLD CULT: NO GROWTH
BACTERIA SPT CULT: NORMAL
GRAM STAIN RESULT: NORMAL
POTASSIUM SERPL-SCNC: 4.1 MMOL/L (ref 3.4–5.3)

## 2023-05-26 PROCEDURE — 94640 AIRWAY INHALATION TREATMENT: CPT | Mod: 76

## 2023-05-26 PROCEDURE — 250N000013 HC RX MED GY IP 250 OP 250 PS 637: Performed by: INTERNAL MEDICINE

## 2023-05-26 PROCEDURE — 120N000001 HC R&B MED SURG/OB

## 2023-05-26 PROCEDURE — 999N000157 HC STATISTIC RCP TIME EA 10 MIN

## 2023-05-26 PROCEDURE — 250N000009 HC RX 250: Performed by: INTERNAL MEDICINE

## 2023-05-26 PROCEDURE — C9113 INJ PANTOPRAZOLE SODIUM, VIA: HCPCS | Performed by: INTERNAL MEDICINE

## 2023-05-26 PROCEDURE — 36415 COLL VENOUS BLD VENIPUNCTURE: CPT | Performed by: INTERNAL MEDICINE

## 2023-05-26 PROCEDURE — 99233 SBSQ HOSP IP/OBS HIGH 50: CPT | Performed by: INTERNAL MEDICINE

## 2023-05-26 PROCEDURE — 250N000011 HC RX IP 250 OP 636: Performed by: INTERNAL MEDICINE

## 2023-05-26 PROCEDURE — 250N000009 HC RX 250: Performed by: HOSPITALIST

## 2023-05-26 PROCEDURE — 84132 ASSAY OF SERUM POTASSIUM: CPT | Performed by: INTERNAL MEDICINE

## 2023-05-26 PROCEDURE — 92526 ORAL FUNCTION THERAPY: CPT | Mod: GN

## 2023-05-26 PROCEDURE — 250N000012 HC RX MED GY IP 250 OP 636 PS 637: Performed by: INTERNAL MEDICINE

## 2023-05-26 PROCEDURE — 94799 UNLISTED PULMONARY SVC/PX: CPT

## 2023-05-26 RX ADMIN — HEPARIN SODIUM 5000 UNITS: 10000 INJECTION, SOLUTION INTRAVENOUS; SUBCUTANEOUS at 08:55

## 2023-05-26 RX ADMIN — IPRATROPIUM BROMIDE 0.5 MG: 0.5 SOLUTION RESPIRATORY (INHALATION) at 11:17

## 2023-05-26 RX ADMIN — LEVALBUTEROL HYDROCHLORIDE 1.25 MG: 1.25 SOLUTION RESPIRATORY (INHALATION) at 20:12

## 2023-05-26 RX ADMIN — AMOXICILLIN AND CLAVULANATE POTASSIUM 1 TABLET: 875; 125 TABLET, FILM COATED ORAL at 08:50

## 2023-05-26 RX ADMIN — SODIUM CHLORIDE SOLN NEBU 3% 3 ML: 3 NEBU SOLN at 07:17

## 2023-05-26 RX ADMIN — IPRATROPIUM BROMIDE 0.5 MG: 0.5 SOLUTION RESPIRATORY (INHALATION) at 07:17

## 2023-05-26 RX ADMIN — BUDESONIDE 0.5 MG: 0.5 INHALANT ORAL at 07:17

## 2023-05-26 RX ADMIN — PANTOPRAZOLE SODIUM 40 MG: 40 INJECTION, POWDER, FOR SOLUTION INTRAVENOUS at 14:26

## 2023-05-26 RX ADMIN — IPRATROPIUM BROMIDE 0.5 MG: 0.5 SOLUTION RESPIRATORY (INHALATION) at 20:12

## 2023-05-26 RX ADMIN — BUDESONIDE 1 MG: 0.5 INHALANT ORAL at 20:13

## 2023-05-26 RX ADMIN — HEPARIN SODIUM 5000 UNITS: 10000 INJECTION, SOLUTION INTRAVENOUS; SUBCUTANEOUS at 19:54

## 2023-05-26 RX ADMIN — LEVALBUTEROL HYDROCHLORIDE 1.25 MG: 1.25 SOLUTION RESPIRATORY (INHALATION) at 07:17

## 2023-05-26 RX ADMIN — SODIUM CHLORIDE SOLN NEBU 3% 3 ML: 3 NEBU SOLN at 20:12

## 2023-05-26 RX ADMIN — LEVALBUTEROL HYDROCHLORIDE 1.25 MG: 1.25 SOLUTION RESPIRATORY (INHALATION) at 15:49

## 2023-05-26 RX ADMIN — IPRATROPIUM BROMIDE 0.5 MG: 0.5 SOLUTION RESPIRATORY (INHALATION) at 15:49

## 2023-05-26 RX ADMIN — LEVALBUTEROL HYDROCHLORIDE 1.25 MG: 1.25 SOLUTION RESPIRATORY (INHALATION) at 11:16

## 2023-05-26 RX ADMIN — PREDNISONE 40 MG: 20 TABLET ORAL at 08:50

## 2023-05-26 RX ADMIN — AMOXICILLIN AND CLAVULANATE POTASSIUM 1 TABLET: 875; 125 TABLET, FILM COATED ORAL at 19:54

## 2023-05-26 RX ADMIN — Medication 81 MG: at 08:49

## 2023-05-26 RX ADMIN — AMLODIPINE BESYLATE 10 MG: 5 TABLET ORAL at 08:50

## 2023-05-26 ASSESSMENT — ACTIVITIES OF DAILY LIVING (ADL)
ADLS_ACUITY_SCORE: 45
ADLS_ACUITY_SCORE: 51
ADLS_ACUITY_SCORE: 45
ADLS_ACUITY_SCORE: 51
ADLS_ACUITY_SCORE: 45
ADLS_ACUITY_SCORE: 49
ADLS_ACUITY_SCORE: 45
ADLS_ACUITY_SCORE: 51
ADLS_ACUITY_SCORE: 45
ADLS_ACUITY_SCORE: 45

## 2023-05-26 NOTE — PROGRESS NOTES
[facility]    Medicine Progress Note - Hospitalist Service    Date of Admission:  5/21/2023    Assessment & Plan   Veronika Hernandez is a 87 year old female admitted on 5/21/2023. She has history of allergic bronchopulmonary aspergillosis, asthma, HTN, TBI, wheelchair bound, ventral hernia, presents for evaluation of hypoxia.       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  -Noting black-colored phlegm which is typical of her ABPA exacerbations  -Here with hypoxia, hypercarbia, tachypnea and increased work of breathing, placed on BiPAP--now weaned to face mask and bipap prn  -nebs, and pulmicort nebs as well  -iv steroids. Changed to Prednisone taper  -iv Zosyn. Change to PO augmentin for aspiration for 4 more days  -CT chest done-- Pronounced lung disease substantially worse on the right, characterized by innumerable small inflammatory appearing pulmonary nodules and subpleural zones of denser consolidation as well as mild inflammatory bronchial wall thickening. Findings nonspecific but atypical infection most likely. Tiny right pleural effusion.  -pulmonary following; signed off on 5/25.     2.Elevated BNP  -No known history of CHF, BNP elevated here 3.2k  -Chest x-ray showing possible pulmonary vascular congestion, possible pleural effusion  -Echocardiogram-5/22/23: EF 60-65%.  -Hold on diuresis  -Tele     3.?Runs of SVT  -Runs of narrow complex tachycardia noted   -Tele monitoring  -EKG if rhythm change     4.HTN  -restarted amlodipine      5.HypoNa  -resolved  -trend     6.Cachexia  -Encourage oral nutrition          Diet: Combination Diet Soft and Bite Sized Diet (level 6); Mildly Thick (level 2)    DVT Prophylaxis: Heparin SQ  Pham Catheter:  Not present  Lines: None     Cardiac Monitoring: None  Code Status: Full Code      Clinically Significant Risk Factors                  # Hypertension: Noted on problem list                 Disposition Plan     Expected Discharge Date: 05/29/2023        Discharge Comments: possible TCU   PT/OT to see      pt and family had refused tcu or HHC. Plan for home with family when pt and family feeling that patient is close to her baseline.     Erika Brar MD  Hospitalist Service  [facility]  Securely message with [employer] (more info)  Text page via [employer] Paging/Directory   ______________________________________________________________________    Interval History   Receiving neb. Mild sob, denies f/c, no n/v    Physical Exam   Vital Signs: Temp: 97.5  F (36.4  C) Temp src: Oral BP: (!) 141/67 (RN notified) Pulse: 83   Resp: 20 SpO2: 94 % O2 Device: Nasal cannula Oxygen Delivery: 1.5 LPM  Weight: 97 lbs .04 oz    General.  Awake alert not in acute distress. Frail elderly  HEENT.  Pupils equal round react to light, anicteric, EOM intact.  Neck supple no JVD.  CVS regular rhythm no murmur gallops.  Lungs.  wheezing to auscultation bilateral.  Abdomen.  Soft nontender bowel sounds present.  Extremities.  No edema no calf tenderness.  Neurological.  General weakness  Skin no rash. No pallor.  Psych. Impaired memory.     Medical Decision Making       57 MINUTES SPENT BY ME on the date of service doing chart review, history, exam, documentation & further activities per the note.      Data         Imaging results reviewed over the past 24 hrs:   No results found for this or any previous visit (from the past 24 hour(s)).

## 2023-05-26 NOTE — PROGRESS NOTES
Care Management Follow Up    Length of Stay (days): 5    Expected Discharge Date: 05/29/2023     Concerns to be Addressed:     Medical progression and discharge planning  Patient plan of care discussed at interdisciplinary rounds: Yes    Anticipated Discharge Disposition:  TBD     Anticipated Discharge Services:  TBD  Anticipated Discharge DME:  TBD    Patient/family educated on Medicare website which has current facility and service quality ratings:  NA  Education Provided on the Discharge Plan:  Per Team  Patient/Family in Agreement with the Plan:  Per Team    Referrals Placed by CM/SW:  [facility]  Private pay costs discussed: Not applicable    Additional Information:  RNCM reviewed chart.  Patient continues to be on IV antibiotics and oxygen (uses home nebs and home oxygen).  Respiratory cultures pending.    Per PT, patient is declining PT/OT/Home care.  Family will provide support at home.    RMCM updated MD.  MD is ok with patient going home as long as patient is getting stronger.       CM will continue to follow progression of care and aide in discharge planning as needed.     Felisha Cason RN

## 2023-05-26 NOTE — PLAN OF CARE
Problem: Plan of Care - These are the overarching goals to be used throughout the patient stay.    Goal: Absence of Hospital-Acquired Illness or Injury  Intervention: Identify and Manage Fall Risk    Safety Promotion/Fall Prevention:   activity supervised   supervised activity   lighting adjusted   patient and family education    A+O, forgetful. Lungs diminished, clear. O2 2L NC. Abdomen - large umbilical hernia noted. Pt denies pain. Unable to ambulate. Angelita lift for OOB to chair. Incontinent of bowel and bladder. [place] at night. Brief. Had one small loose BM this shift. Family member staying overnight with pt. Family relays needs to RN and PCT as needed. Call bell in reach, will continue to monitor.

## 2023-05-26 NOTE — PLAN OF CARE
Problem: Plan of Care - These are the overarching goals to be used throughout the patient stay.    Goal: Plan of Care Review  Description: The Plan of Care Review/Shift note should be completed every shift.  The Outcome Evaluation is a brief statement about your assessment that the patient is improving, declining, or no change.  This information will be displayed automatically on your shift note.  Outcome: Progressing     Problem: Plan of Care - These are the overarching goals to be used throughout the patient stay.    Goal: Readiness for Transition of Care  Outcome: Progressing     Problem: Gas Exchange Impaired  Goal: Optimal Gas Exchange  Outcome: Progressing   Goal Outcome Evaluation:    Patient has stayed in bed most of the day, but will consider getting up to chair later this afternoon. Declines repositioning. She continues to maintain o2 sat above 90% while on 1 liter of o2. Discussed with Dr. Brar changing IV Protonix to PO which will be done today. Continues to use pure wick while in bed and has had episodes of urinary and fecal incontinence. She did eat her full breakfast, but declines lunch.

## 2023-05-26 NOTE — PLAN OF CARE
Problem: Plan of Care - These are the overarching goals to be used throughout the patient stay.    Goal: Plan of Care Review  Description: The Plan of Care Review/Shift note should be completed every shift.  The Outcome Evaluation is a brief statement about your assessment that the patient is improving, declining, or no change.  This information will be displayed automatically on your shift note.  5/26/2023 1732 by Malena Burden RN  Outcome: Progressing  5/26/2023 1257 by Malena Burden RN  Outcome: Progressing     Problem: Plan of Care - These are the overarching goals to be used throughout the patient stay.    Goal: Readiness for Transition of Care  5/26/2023 1732 by Malena Burden RN  Outcome: Progressing  5/26/2023 1257 by Malena Burden RN  Outcome: Progressing   Goal Outcome Evaluation:    Patient has stayed in bed most of the day and declined getting up to chair. She continues to maintain o2 sat above 90% while on 1 liter of o2. Discussed with Dr. Brar changing IV Protonix to PO, but the order was not available for today's dose. Continues to use pure wick while in bed and has had episodes of urinary and fecal incontinence. She did eat her full breakfast, but declined lunch and dinner.

## 2023-05-26 NOTE — PROGRESS NOTES
"SPIRITUAL HEALTH SERVICES NOTE  [facility]; P1    SPIRITUAL ASSESSMENT  States she is ready to go home  Family is supportive  Welcomes communion    FULL SPIRITUAL CARE NOTE:   Saw Veronika due to admission screening response/patient request upon admission. She was resting when I stopped by, but welcomed a conversation. Veronika was accompanied by her DIL and granddaughter. Veronika says she doesn't know when she will be discharged, but says that she feels ready to go home. She says that her body and her spirit are both feeling \"so-so\". Her DIL notes that the family is making changes to her arrangements at home in anticipation of her return - including obtaining a hospital bed for her. She also notes that Veronika's  Zenon is not feeling well at this time. Veronika is Orthodox and she welcomes communion and a prayer. No other needs/concerns identified at this time.     Time Spent with Patient/Family: 10 minutes    PLAN OF CARE: No further plans for follow-up at this time, but will remain available for further support as patient/family needs/desires.    Yuli Rodriguez M.Div.      Office: [phone] (for non-urgent requests)  Please Vocera or page through [facility] for time-sensitive requests    "

## 2023-05-26 NOTE — PROGRESS NOTES
Patient on 1.5 NC satting 94%.Patient received Atrovent x2, Xopenex x2,Pulmicort x1  Nebusal x1 r given.  Flutter valve x2 given. Patient tolerating the treatment well. RT will continue to monitor

## 2023-05-26 NOTE — PLAN OF CARE
Problem: Plan of Care - These are the overarching goals to be used throughout the patient stay.    Goal: Plan of Care Review  Description: The Plan of Care Review/Shift note should be completed every shift.  The Outcome Evaluation is a brief statement about your assessment that the patient is improving, declining, or no change.  This information will be displayed automatically on your shift note.  5/26/2023 1739 by Malena Burden RN  Outcome: Progressing  5/26/2023 1732 by Malena Burden RN  Outcome: Progressing  5/26/2023 1257 by Malena Burden RN  Outcome: Progressing   Goal Outcome Evaluation:       Patient was transferred to unit from ICU at 1500 today. She was recently diagnosed with type II diabetes and elevated blood sugars through primary care. Her A1c was >15 on admission. She is on Potassium and Magnesium protocol. She is receiving Lantus and Novolog. Discussed possibility of home insulin use and educated patient on administration. Also discussed carbohydrate counting an sliding scale insulin use. She uses CPAP at night.     Malena Burden RN

## 2023-05-27 VITALS
TEMPERATURE: 97.8 F | DIASTOLIC BLOOD PRESSURE: 72 MMHG | WEIGHT: 97 LBS | RESPIRATION RATE: 18 BRPM | BODY MASS INDEX: 19.04 KG/M2 | SYSTOLIC BLOOD PRESSURE: 146 MMHG | OXYGEN SATURATION: 97 % | HEIGHT: 60 IN | HEART RATE: 72 BPM

## 2023-05-27 LAB
HOLD SPECIMEN: NORMAL
POTASSIUM SERPL-SCNC: 3.9 MMOL/L (ref 3.4–5.3)

## 2023-05-27 PROCEDURE — 94640 AIRWAY INHALATION TREATMENT: CPT

## 2023-05-27 PROCEDURE — 999N000157 HC STATISTIC RCP TIME EA 10 MIN

## 2023-05-27 PROCEDURE — 99239 HOSP IP/OBS DSCHRG MGMT >30: CPT | Performed by: INTERNAL MEDICINE

## 2023-05-27 PROCEDURE — 84132 ASSAY OF SERUM POTASSIUM: CPT | Performed by: INTERNAL MEDICINE

## 2023-05-27 PROCEDURE — 250N000009 HC RX 250: Performed by: INTERNAL MEDICINE

## 2023-05-27 PROCEDURE — 250N000013 HC RX MED GY IP 250 OP 250 PS 637: Performed by: INTERNAL MEDICINE

## 2023-05-27 PROCEDURE — 250N000009 HC RX 250: Performed by: HOSPITALIST

## 2023-05-27 PROCEDURE — 36415 COLL VENOUS BLD VENIPUNCTURE: CPT | Performed by: INTERNAL MEDICINE

## 2023-05-27 PROCEDURE — 250N000012 HC RX MED GY IP 250 OP 636 PS 637: Performed by: INTERNAL MEDICINE

## 2023-05-27 RX ORDER — TRAZODONE HYDROCHLORIDE 50 MG/1
25 TABLET, FILM COATED ORAL
Qty: 5 TABLET | Refills: 1 | Status: SHIPPED | OUTPATIENT
Start: 2023-05-27

## 2023-05-27 RX ORDER — PREDNISONE 20 MG/1
40 TABLET ORAL DAILY
Qty: 25 TABLET | Refills: 0 | Status: SHIPPED | OUTPATIENT
Start: 2023-05-27

## 2023-05-27 RX ADMIN — SODIUM CHLORIDE SOLN NEBU 3% 3 ML: 3 NEBU SOLN at 08:36

## 2023-05-27 RX ADMIN — AMLODIPINE BESYLATE 10 MG: 5 TABLET ORAL at 09:35

## 2023-05-27 RX ADMIN — PREDNISONE 40 MG: 20 TABLET ORAL at 09:35

## 2023-05-27 RX ADMIN — AMOXICILLIN AND CLAVULANATE POTASSIUM 1 TABLET: 875; 125 TABLET, FILM COATED ORAL at 09:35

## 2023-05-27 RX ADMIN — LEVALBUTEROL HYDROCHLORIDE 1.25 MG: 1.25 SOLUTION RESPIRATORY (INHALATION) at 08:36

## 2023-05-27 RX ADMIN — IPRATROPIUM BROMIDE 0.5 MG: 0.5 SOLUTION RESPIRATORY (INHALATION) at 08:39

## 2023-05-27 RX ADMIN — Medication 81 MG: at 09:35

## 2023-05-27 RX ADMIN — BUDESONIDE 1 MG: 0.5 INHALANT ORAL at 08:37

## 2023-05-27 ASSESSMENT — ACTIVITIES OF DAILY LIVING (ADL)
ADLS_ACUITY_SCORE: 45

## 2023-05-27 NOTE — PROGRESS NOTES
Care Management Discharge Note    Discharge Date: 05/27/2023       Discharge Disposition:  Home      Additional Information:  Discharge anticipated. No further CM needs identified at this time.        FLOWER Guillaume

## 2023-05-27 NOTE — DISCHARGE SUMMARY
[facility]    Discharge Summary  Hospitalist    Date of Admission:  5/21/2023  Date of Discharge:  5/27/2023  Discharging Provider: Erika Brar MD  Date of Service (when I saw the patient): 05/27/23    Discharge Diagnoses   Acute asthma exacerbation vs allergic bronchopulmonary aspergillosis  Aspiration pneumonia    History of Present Illness   Veronika Hernandez is an 87 year old female who presented with sob, hypoxia      Hospital Course   Veronika Hernandez is a 87 year old female admitted on 5/21/2023. She has history of allergic bronchopulmonary aspergillosis, asthma, HTN, TBI, wheelchair bound, ventral hernia, presents for evaluation of hypoxia.        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  -Noting black-colored phlegm which is typical of her ABPA exacerbations  -Here with hypoxia, hypercarbia, tachypnea and increased work of breathing, placed on BiPAP. Weaned off biPAP.  -nebs, and pulmicort nebs as well  -iv steroids. Changed to Prednisone taper  -iv Zosyn. Change to PO augmentin for aspiration for 4 more days  -CT chest done-- Pronounced lung disease substantially worse on the right, characterized by innumerable small inflammatory appearing pulmonary nodules and subpleural zones of denser consolidation as well as mild inflammatory bronchial wall thickening. Findings nonspecific but atypical infection most likely. Tiny right pleural effusion.  -pulmonary following; signed off on 5/25.     2.Elevated BNP  -No known history of CHF, BNP elevated here 3.2k  -Chest x-ray showing possible pulmonary vascular congestion, possible pleural effusion  -Echocardiogram-5/22/23: EF 60-65%.  -Hold on diuresis  -Tele     3.?Runs of SVT  -Runs of narrow  complex tachycardia noted   -Tele monitoring  -EKG if rhythm change     4.HTN  -restarted amlodipine      5.HypoNa  -resolved  -trend     6.Cachexia  -Encourage oral nutrition    Erika Brar MD    Significant Results and Procedures   See below    Pending Results     Unresulted Labs Ordered in the Past 30 Days of this Admission     No orders found from 4/21/2023 to 5/22/2023.          Code Status   Full Code       Primary Care Physician   Georgia Tucker    General.  Awake alert oriented in mild distress, frail elderly  HEENT.  Pupils equal round react to light, anicteric, EOM intact.  Neck supple no JVD.  CVS regular rhythm no murmur gallops.  Lungs.  Mild diffuse wheezing to auscultation bilateral   Abdomen.  Soft nontender bowel sounds present.  Extremities.  No edema no calf tenderness.  Neurological. General weakness  Skin no rash. No pallor.  Psych. Normal mood.     Discharge Disposition   Discharged to home; patient and family refused TCU or HHC  Condition at discharge: Fair    Consultations This Hospital Stay   PULMONARY IP CONSULT  CARE MANAGEMENT / SOCIAL WORK IP CONSULT  SPEECH LANGUAGE PATH ADULT IP CONSULT  SPIRITUAL HEALTH SERVICES IP CONSULT  PHYSICAL THERAPY ADULT IP CONSULT  OCCUPATIONAL THERAPY ADULT IP CONSULT  SOCIAL WORK IP CONSULT    Time Spent on this Encounter   I, Erika Brar MD, personally saw the patient today and spent greater than 30 minutes discharging this patient.    Discharge Orders      Reason for your hospital stay    Acute COPD exacerbation  Aspiration pneumonia     Follow-up and recommended labs and tests     Follow up with primary care provider, Georgia Tucker, within 7 days to evaluate medication change and for hospital follow- up.  The following labs/tests are recommended: cbc, bmp.    Follow up with Pulmonologist at your earliest convenience     Activity    Your activity upon discharge: activity as tolerated; fall precaution     When to contact your care team    Call  your primary doctor if you have any of the following:  increased shortness of breath or fever or any concerns.     Discharge Instructions    Oxygen saturation (SPO2) goal: 88-92%; avoid SPO2>92% or< 88%     Diet    Follow this diet upon discharge: Orders Placed This Encounter      Combination Diet Soft and Bite Sized Diet (level 6); Mildly Thick (level 2)     Discharge Medications   Current Discharge Medication List      START taking these medications    Details   amoxicillin-clavulanate (AUGMENTIN) 875-125 MG tablet Take 1 tablet by mouth every 12 hours for 2 days  Qty: 4 tablet, Refills: 0    Associated Diagnoses: Aspiration pneumonitis (H)      traZODone (DESYREL) 50 MG tablet Take 0.5 tablets (25 mg) by mouth nightly as needed for sleep  Qty: 5 tablet, Refills: 1    Associated Diagnoses: Drug-induced insomnia (H)         CONTINUE these medications which have CHANGED    Details   predniSONE (DELTASONE) 20 MG tablet Take 2 tablets (40 mg) by mouth daily  Qty: 25 tablet, Refills: 0    Comments: 40 mg daily for 4 days, then 30 mg po daily for 5 days then 20 mg po daily for 5 days then 10 mg po daily  Associated Diagnoses: COPD exacerbation (H)         CONTINUE these medications which have NOT CHANGED    Details   acetaminophen (TYLENOL) 325 MG tablet [ACETAMINOPHEN (TYLENOL) 325 MG TABLET] Take 650 mg by mouth every 6 (six) hours as needed for pain.      albuterol (PROVENTIL) 2.5 mg /3 mL (0.083 %) nebulizer solution [ALBUTEROL (PROVENTIL) 2.5 MG /3 ML (0.083 %) NEBULIZER SOLUTION] Take 2.5 mg by nebulization every 6 (six) hours as needed for wheezing.      amLODIPine (NORVASC) 10 MG tablet [AMLODIPINE (NORVASC) 10 MG TABLET] Take 10 mg by mouth daily.      ASMANEX TWISTHALER 220 mcg (30 doses) AePB inhaler [ASMANEX TWISTHALER 220 MCG (30 DOSES) AEPB INHALER] INHALE 1 PUFF DAILY.  Qty: 3 each, Refills: 3    Associated Diagnoses: Asthma      aspirin 81 MG EC tablet [ASPIRIN 81 MG EC TABLET] Take 1 tablet (81 mg  "total) by mouth daily.  Refills: 0    Associated Diagnoses: Other chest pain      calcium carbonate-vitamin D3 (CALCIUM 600 + D,3,) 600 mg(1,500mg) -400 unit per tablet [CALCIUM CARBONATE-VITAMIN D3 (CALCIUM 600 + D,3,) 600 MG(1,500MG) -400 UNIT PER TABLET] Take 1 tablet by mouth 2 (two) times a day with meals.                  Allergies   Allergies   Allergen Reactions     Azithromycin Anaphylaxis     Cefdinir Unknown     Annotation: SOB, wheeze       Cephalexin Unknown     Annotation: asthma exacerbation       Contrast [Iohexol] Unknown     Pt states she \"almost \"      Erythromycin Base [Erythromycin] Unknown     difficulty breathing       Iodine Unknown     difficulty breathing       Moxifloxacin Unknown     Data   Most Recent 3 CBC's:Recent Labs   Lab Test 23  0607 23  0638 23  0629 23  0704   WBC  --  9.0 8.3 8.9   HGB  --  11.9 11.8 11.7   MCV  --  89 90 90    401 344 338      Most Recent 3 BMP's:  Recent Labs   Lab Test 23  0820 23  1124 23  0607 23  1554 23  0638 23  0629 23  0704   NA  --   --   --   --  142 143 141   POTASSIUM 3.9 4.1 3.5   < > 3.3* 3.7 3.5   CHLORIDE  --   --   --   --  101 102 101   CO2  --   --   --   --  31* 27 27   BUN  --   --   --   --  19.3 29.3* 25.9*   CR  --   --   --   --  0.35* 0.40* 0.50*   ANIONGAP  --   --   --   --  10 14 13   TINY  --   --   --   --  8.5* 8.7* 9.0   GLC  --   --   --   --  165* 152* 152*    < > = values in this interval not displayed.     Most Recent 2 LFT's:  Recent Labs   Lab Test 19  1310 19  1135   AST 68* 16   ALT 58* 11   ALKPHOS 129* 78   BILITOTAL 0.5 0.5     Most Recent INR's and Anticoagulation Dosing History:  Anticoagulation Dose History         Latest Ref Rng & Units 2019   Recent Dosing and Labs   INR 0.90 - 1.10 0.98   0.97                Most Recent 3 Troponin's:No lab results found.  Most Recent Cholesterol Panel:  Recent Labs   Lab Test " 09/28/15  1051   CHOL 232*      HDL 90   TRIG 82     Most Recent 6 Bacteria Isolates From Any Culture (See EPIC Reports for Culture Details):No lab results found.  Most Recent TSH, T4 and A1c Labs:No lab results found.  Results for orders placed or performed during the hospital encounter of 05/21/23   XR Chest Port 1 View    Narrative    EXAM: XR CHEST PORT 1 VIEW  LOCATION: [facility]  DATE/TIME: 5/21/2023 7:32 PM CDT    INDICATION: Shortness of breath.  COMPARISON: 05/15/2021.      Impression    IMPRESSION: Mild diffuse interstitial change, favoring a mild pulmonary vascular congestion pattern. Scattered soft tissue densities throughout the right lung, which appears to represent progression of scarring in the right upper lobe, though   superimposed acute infiltrate is not excluded. Opacity within the periphery of the right lung base is possibly more acute. Uncertain if this represents airspace consolidation and/or a small right pleural effusion. CT imaging may be helpful for further   assessment. At a minimum, short-term follow-up chest radiograph is recommended to document stability or resolution of these findings.    No pneumothorax.    Heart size is normal. Atherosclerosis of the thoracic aorta.    Postsurgical changes of the visualized lumbar spine. Vertebroplasty changes of the thoracolumbar junction, as well.   CT Chest w/o Contrast    Narrative    EXAM: CT CHEST W/O CONTRAST  LOCATION: [facility]  DATE/TIME: 5/21/2023 10:53 PM CDT    INDICATION: hypoxia, allergic pulmonary aspergillosis  COMPARISON: Abdominal CT 05/30/2019  TECHNIQUE: CT chest without IV contrast. Multiplanar reformats were obtained. Dose reduction techniques were used.  CONTRAST: None.    FINDINGS:   LUNGS AND PLEURA: Prominent lung disease characterized by innumerable small irregular, inflammatory appearing pulmonary nodules predominantly involving right lung with zones of  coalescing peripheral consolidation. Trace volume right pleural effusion.   Involvement within left lung is substantially less pronounced though similar tiny inflammatory nodules are present at both lung bases and lung apex. Mild diffuse inflammatory bronchial wall thickening. All these findings are new compared to prior exam.    MEDIASTINUM/AXILLAE: Normal size heart. No significant adenopathy.    CORONARY ARTERY CALCIFICATION: Moderate.    UPPER ABDOMEN: No significant finding.    MUSCULOSKELETAL: Compression abnormalities lower thoracic spine similar to previous exam no suspicious bony lesion.. Previous vertebroplasty's and fusion upper lumbar spine.      Impression    IMPRESSION:   1.  Pronounced lung disease substantially worse on the right, characterized by innumerable small inflammatory appearing pulmonary nodules and subpleural zones of denser consolidation as well as mild inflammatory bronchial wall thickening. Findings   nonspecific but atypical infection most likely.  2.  Tiny right pleural effusion.     XR Video Swallow with SLP or OT    Narrative    EXAM: XR VIDEO SWALLOW WITH SLP OR OT  LOCATION: [facility]  DATE/TIME: 5/23/2023 11:22 AM CDT    INDICATION: Difficulty swallowing.  COMPARISON: None.    TECHNIQUE: Routine swallow study with speech pathology using multiple barium thicknesses.    FINDINGS:   FLUOROSCOPIC TIME: 1.9  NUMBER OF IMAGES: 9 saved fluoroscopic loops.  RADIATION DOSE: Reference Air Kerma:10.40 mGy.    Swallow study with Speech Pathology using multiple barium thicknesses.     Penetration with trace aspiration of thin liquid without cough reflex. Stasis in the valleculae.      Impression    IMPRESSION:  1.  Penetration with thin liquid with small amount of silent aspiration.   XR Chest 1 View    Narrative    EXAM: XR CHEST 1 VIEW  LOCATION: [facility]  DATE/TIME: 5/25/2023 6:46 AM CDT    INDICATION: follow up pneumonia  COMPARISON: CT  CXR 2023      Impression    IMPRESSION: Decrease right upper lobe apical airspace opacities. Biapical scarring. Heart size normal. No pleural effusion. Osteoarthrosis right shoulder. Lumbar spine fusion. Oral contrast in large bowel.   Echocardiogram Complete     Value    LVEF  60-65%    [facility]    [id number]  [id number]  [id number]  [id number]^[address] [address]     Name: GAVINO DHILLON  MRN: [id number]  : 1935  Study Date: 2023 09:55 AM  Age: 87 yrs  Gender: Female  Patient Location: [facility]  Reason For Study: CHF  Ordering Physician: YAN EISENBERG  Performed By: TERRY     BSA: 1.5 m2  Height: 64 in  Weight: 100 lb  HR: 100  BP: 134/61 mmHg  ______________________________________________________________________________  Procedure  Complete Portable Echo Adult. There is no comparison study available.  ______________________________________________________________________________  Interpretation Summary     1.Left ventricular size, wall motion and function are normal. The ejection  fraction is 60-65%.  2.Normal right ventricle size and systolic function.  3.There is moderate to mod-severe (2-3+) tricuspid regurgitation. IVC not  interrogated.  4.Aortic valve not well interrogated, appears to be trileaflet and calcified,  suspect at least mild aortic stenosis, would suggest repeat study with  attention to the aortic valve.  5.Right ventricular systolic pressure is elevated, consistent with mild  pulmonary hypertension.  There is no comparison study available.  ______________________________________________________________________________  I      WMSI = 1.00     % Normal = 100     X - Cannot   0 -                      (2) - Mildly 2 -          Segments  Size  Interpret    Hyperkinetic 1 - Normal  Hypokinetic  Hypokinetic  1-2     small                                                     7 -          3-5      moderate  3 - Akinetic 4 -          5 -          6 - Akinetic Dyskinetic   6-14    large               Dyskinetic   Aneurysmal  w/scar       w/scar       15-16   diffuse     Left Ventricle  Left ventricular size, wall motion and function are normal. The ejection  fraction is 60-65%. There is normal left ventricular wall thickness. Left  ventricular diastolic function is normal. No regional wall motion  abnormalities noted.     Right Ventricle  Normal right ventricle size and systolic function. TAPSE is normal, which is  consistent with normal right ventricular systolic function.     Atria  The left atrium is mildly dilated. Right atrial size is normal. There is no  color Doppler evidence of an atrial shunt.     Mitral Valve  There is mild to moderate mitral annular calcification. There is no mitral  regurgitation noted. There is no mitral valve stenosis.     Tricuspid Valve  The tricuspid valve is not well visualized, but is grossly normal. There is  moderate to mod-severe (2-3+) tricuspid regurgitation. Right ventricular  systolic pressure is elevated, consistent with mild pulmonary hypertension.  There is no tricuspid stenosis.     Aortic Valve  Aortic valve not well interrogated, appears to be trileaflet and calcified,  suspect at least mild aortic stenosis, would suggest repeat study with  attention to the aortic valve.     Pulmonic Valve  The pulmonic valve is not well seen, but is grossly normal. This degree of  valvular regurgitation is within normal limits. There is no pulmonic valvular  stenosis.     Vessels  The aorta root is normal. Normal size ascending aorta. IVC diameter <2.1 cm  collapsing >50% with sniff suggests a normal RA pressure of 3 mmHg.     Pericardium  There is no pericardial effusion.     Rhythm  Sinus rhythm was noted.     ______________________________________________________________________________  MMode/2D Measurements & Calculations  RV Base: 3.9 cm  TAPSE: 2.1 cm     Time Measurements  MM HR: 93.0 BPM     Doppler Measurements &  Calculations  LV V1 max PG: 3.0 mmHg  LV V1 max: 86.8 cm/sec  LV V1 VTI: 18.3 cm  PA acc time: 0.10 sec  TR max [place]: 293.0 cm/sec  TR max P.3 mmHg  RV S [place]: 14.6 cm/sec     ______________________________________________________________________________  Report approved by: Pino Chilel 2023 01:18 PM

## 2023-05-27 NOTE — PLAN OF CARE
Goal Outcome Evaluation:      Plan of Care Reviewed With: patient    Overall Patient Progress: improvingOverall Patient Progress: improving       Patient to discharge to home with family.  Family to bring patient's wheelchair from home.  IV removed.  AM medications administered, family declined heparin due to discharge.  Incontinent of bowel and bladder.  Family requests a pull-up brief for discharge.  Family states they have a pure wick system at home.  Paperwork reviewed with family.

## 2023-05-27 NOTE — PLAN OF CARE
Problem: Plan of Care - These are the overarching goals to be used throughout the patient stay.    Goal: Plan of Care Review  Description: The Plan of Care Review/Shift note should be completed every shift.  The Outcome Evaluation is a brief statement about your assessment that the patient is improving, declining, or no change.  This information will be displayed automatically on your shift note.  Outcome: Progressing   Goal Outcome Evaluation:    Patient is A&O x4. Denies pain. Continues to be on 4L of O2 via NC. Observed no signs of respiratory distress. Patient's daughter remained at bedside throughout the night. Patient had a restful night.     /56 (BP Location: Left arm)   Pulse 83   Temp 98.6  F (37  C) (Oral)   Resp 19   Ht 1.524 m (5')   Wt 44 kg (97 lb)   SpO2 92%   BMI 18.94 kg/m

## 2023-05-27 NOTE — PROGRESS NOTES
Respiratory Therapy Note    Patient is receiving BID Pulmicort, QID Atrovent/Xopenex and BID Nebusal. Patient is on 3L NC, SpO2 97%. Pre-treatment HR 73, RR 18, Breath sounds clear and diminished. Post-treatment HR 76, RR 18 and breath sounds unchanged. Cough is moderate and NP. Pt does have an albuterol inhaler at home for as needed use. Asked daughter if pt has spacer for inhaler, states having one. Continue to encourage deep breathing and coughing techniques.     Karuna Sauer, RT

## 2023-05-30 ENCOUNTER — PATIENT OUTREACH (OUTPATIENT)
Dept: CARE COORDINATION | Facility: CLINIC | Age: 88
End: 2023-05-30
Payer: COMMERCIAL

## 2023-05-30 NOTE — PROGRESS NOTES
[facility]    Background: Transitional Care Management program identified per system criteria and reviewed by [facility] Resource Center team for possible outreach.    Assessment: Upon chart review, CCR Team member will not proceed with patient outreach related to this episode of Transitional Care Management program due to reason below:    Patient has active communication with a nurse, provider or care team from their [facility] PCP clinic (spoke with triage nurse today) for reason of post-hospital follow up plan.  Outreach call by CCR team not indicated to minimize duplicative efforts.     Plan: Transitional Care Management episode addressed appropriately per reason noted above.      Cydney Vivas RN  Connected Care Resource Center, [facility]    *Connected Care Resource Team does NOT follow patient ongoing. Referrals are identified based on internal discharge reports and the outreach is to ensure patient has an understanding of their discharge instructions.

## 2023-08-12 ENCOUNTER — HEALTH MAINTENANCE LETTER (OUTPATIENT)
Age: 88
End: 2023-08-12

## 2024-09-29 ENCOUNTER — HEALTH MAINTENANCE LETTER (OUTPATIENT)
Age: 89
End: 2024-09-29

## 2025-02-02 NOTE — TELEPHONE ENCOUNTER
Call from patient's daughter.  States previously her mom was taking itraconazole 100mg two times a day, but the prescription that was sent by Dr. Mckenzie was only for once per day.  Asking for clarification.  Reviewed with Dr. Mckenzie and will change prescription to two times a day.    Spoke with patient's daughter, Nora regarding the change in directions.  Also spoke with the pharmacy regarding new orders.   Patient requests all Lab, Cardiology, and Radiology Results on their Discharge Instructions

## 2025-02-18 NOTE — TELEPHONE ENCOUNTER
PROGRESS  NOTE      HISTORY    Jesus Coffman is a 73 year old male who presents   with medical history significant for essential hypertension surgical history significant appendicectomy hip surgery and [facility], decreased exercise tolerance and shortness of breath     Patient has declined any bleeding.     HEMATOLOGY HISTORY  -patient has previous labs dated 09/13/2021 wherein was noted hemoglobin of 14 normocytic normochromic RBC normal WBC count of 5.6 and platelet count 270.    -09/05/2023 patient presented with white cell count of 1 point hemoglobin of 8.1 and platelet count of 83 patient had further workup in form of iron profile indicative of elevated ferritin 845 serum iron of 149 and percentage iron saturation of 58% TIBC of 273..    -09/06/2023 labs indicative of white cell count 1.2 hemoglobin of 8 and platelet count of 80  -peripheral smear review 09/06/2023: Erythrocytes with mild anisopoikilocytosis with few microcytes, macrocytes, ovalocytes and teardrop cells.  Few polychromatic erythrocytes seen.  Leukopenia with neutropenia.  No definite leukoblast seen.  Thrombocytopenia.    -09/06/2023 ultrasound of liver consistent with diffuse fatty infiltration of the liver without any evidence of mass noted to have but noted to have gallbladder wall polyp  -BONE MARROW BIOPSY:  09/07/2023 INDICATIVE OF acute myeloid leukemia with 20-22% blast and background trilineage dysplasia with expanded myeloid blast population and bone marrow flow cytometry analysis consistent with expanded population of myeloid leukocytic blast identified at 9.3%Bone marrow evaluation demonstrates a hypercellular bone marrow for age with trilineage dysplasia and increased blasts accounting for 20 to 22% of the marrow cellularity by CD34 staining.  These findings meet the minimal criteria for diagnosis of acute myeloid leukemia.  -9/12/23:Currently patient does not have hyper leukocytosis and patient  That is fine currently elderly above 70 years of age and would not be a candidate for high-intensity therapy.  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  -11/21/23 planned on c3d1 of azacytadine and venetoclax.  Patient has completed 7 days of treatment and on day 7 did report new symptoms of dizziness.    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  No acute CVA identified currently  --12/05/2023 dizziness has completely resolved and patient was resumed on venetoclax and antibiotics Compazine was held.  MRI brain was negative for any acute CVA and in this scenario vertigo likely attributed due to middle ear pathology which is responding well to oral meclizine.    -after 4 cycles repeat bone marrow biopsy on 01/18/2024 consistent with hypocellular marrow with reduced trilineage hematopoiesis negative for expanded population of myeloid blast with cytogenetic studies pending ,There is no morphologic or immunophenotypic evidence of an expanded population of myeloid blasts. .    -01/25/2024 patient proceeded with cycle 5 day 1 of venetoclax with azacitidine with venetoclax 200 mg p.o. q.day days 3 weeks on 1 week off  -3/28/24 treatment held due to anc 0.0.plan to resume after 2 weeks with weekly labs.  -4/18/24  pathology review:Hypercellular marrow with erythroid predominant trilineage hematopoiesis and less than 1% blasts .Classical cytogenetic studies pending.-Expanded population of myeloid leukocytic blasts is not identified.These results are not solely diagnostic and can only be interpreted in the context of the other data for this patient.  -4/23/24 treatment held for 1 week .plan to dose reduce azacytadineto 37.5mg/m2 and continue continue venetoclax 200 mg po three weeks on and one week off.  -4/30/2024 patient initiated on cycle 7-day 1 with azacitidine 37.5 mg/m² and was continued on venetoclax 200 mg p.o. daily for 3 weeks on 1 week off.  If the patient tolerates this dose well then can consider increasing the dose of his decitabine to 50 mg/m².    -05/28/2024 patient initiated on cycle 8 day 1 of azacitidine 37.5 mg per m2 and was continued on venetoclax 200 mg p.o. daily for 3 weeks on and 1 week off.    -06/25/2024 patient initiated on cycle 9 day 1 of azacitidine 37.5 mg per m2 and was continued on venetoclax 200 mg p.o. daily for 3 weeks on and 1 week off  -7/23/2022 patient presents for cycle 10-day 1 of azacitidine 37.5 mg/m² and venetoclax 200 mg p.o. daily 3 weeks on 1 week off.  -8/20/24patient presents for cycle 11-day 1 of azacitidine 37.5 mg/m² and venetoclax 200 mg p.o. daily 3 weeks on 1 week off.  -9/17/24 patient planned on cycle 12 day 1 of azacitidine 37.5 mg/m² and venetoclax 200 mg p.o. daily 3 weeks on 1 week off.  -10/22/24 patient planned on cycle 13 day 1 of azacitidine 37.5 mg/m² and venetoclax 200 mg p.o. daily 3 weeks on 1 week off.  -11/19/24 planned on cycle 14 day 1 of azacitidine 37.5 mg/m² and venetoclax 200 mg p.o. daily 3 weeks on 1 week off.  -12/17/24 planned on cycle 15 day 1 ofazacitidine 37.5 mg/m² and venetoclax 200 mg p.o. daily 3 weeks on 1 week off.  1/14/24 planned on cycle 16 day 1 of azacitidine 37.5 mg/m² and venetoclax 200 mg p.o. daily 3 weeks on 1 week off.    -2/11/25planned on cycle 16 day 1 of azacitidine 37.5 mg/m² and venetoclax 200 mg p.o. daily 3 weeks on 1 week off.  However ANC was noted to be 0.3 therefore treatment was held patient plan for follow-up in 1 week's time in order to consider reinitiating treatment  -2/18/2025 patient proceeded with cycle 16-day 1 of azacitidine 37.5 mg/m² and venetoclax 200 mg p.o. daily for 3 weeks on 1 week off with ANC of 0.6        INTERVAL HISTORY:   Patient presents to the clinic today for a follow-up and possible treatment.    He is doing alright. Patient reports of ongoing dizziness that begins in the morning and slowly resolves in the afternoon. He continues his PT excercises for his dizziness, however he states he does not do them often as he should. Patient denies chest pain and abdominal pain.    Patient continues to drink alcohol socially. He had an alcoholic drink while watching the [employer].     All labs from today were reviewed during the visit. WBCs have slightly improved to 1.9 with an ANC of 0.6. HGB is stable around 12.6. Electrolytes, kidney function and organ function are within normal limits. LDH is normal at 155.     Overall lab counts are adequate for patient to proceed with Azacytadine today. Advised resuming Venetoclax 200 mg p.o qd for 3 weeks on and 1 week off. Oral chemotherapy adherence and review of side effects: patient taking treatment as prescribed without significant side effects. Patient is advised to follow-up with the clinic before he begins his next cycle of Venetoclax. He will also continue his levofloxacin, fluconazole and acyclovir. His Bactrim was refilled during the visit.     Patient is advised to take Tylenol in the mornings for his Arthritis pains.     Follow up in 4 weeks for MD visit with labs and possible treatment.     Tami SANTANA, our cancer nurse navigator, was present at the time of visit.    Patient Active Problem List    Diagnosis Date Noted    Thrombocytopenia (CMD) 10/06/2023      Priority: Medium    Bilateral primary osteoarthritis of knee 10/14/2024     Priority: Low    Hyperlipidemia 10/14/2024     Priority: Low     Overview Note:     Dec 13, 2022 Entered By: ALEM THOMPSON Comment: declines statin      Male erectile dysfunction, unspecified 10/14/2024     Priority: Low    Neutropenia, unspecified (CMD) 10/14/2024     Priority: Low    Personal history of gout 10/14/2024     Priority: Low    Sensorineural hearing loss, bilateral 10/14/2024     Priority: Low    Dizziness 11/28/2023     Priority: Low    Other specified anemias 10/06/2023     Priority: Low    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   ALLERGIES:   Allergen Reactions    Adhesive   (Environmental) Other (See Comments)     redness       Current Outpatient Medications   Medication Sig Dispense Refill    venetoclax (VENCLEXTA) 100 MG tablet Take 2 tablets by mouth daily (with breakfast) for 21 days. Off for 7 days thereafter (Patient not taking: Reported on 2/18/2025) 42 tablet 0    fluconazole (DIFLUCAN) 200 MG tablet Take 1 tablet by mouth daily. 90 tablet 3    sulfamethoxazole-trimethoprim (BACTRIM DS) 800-160 MG per tablet TAKE 1 TABLET BY MOUTH THREE TIMES PER WEEK AS DIRECTED WITH A GLASS OF WATER UNTIL ALL ARE TAKEN, FOR INFECTION 12 tablet 0    acyclovir (ZOVIRAX) 400 MG tablet TAKE ONE TABLET BY MOUTH TWICE A  tablet 3    naproxen sodium (ALEVE) 220 MG tablet Take 220 mg by mouth in the morning and 220 mg  in the evening. Take with meals. (Patient not taking: Reported on 2/11/2025)      MULTIPLE VITAMIN PO  (Patient not taking: Reported on 2/11/2025)      levoFLOXacin (LEVAQUIN) 500 MG tablet Take 1 tablet by mouth daily. 30 tablet 5    DISPENSE One a day 65 and older daily vitamin (Patient not taking: Reported on 2/18/2025)      losartan (COZAAR) 50 MG tablet 25 mg.      meclizine (ANTIVERT) 25 MG tablet Take 1 tablet by mouth 3 times daily as needed for Dizziness. (Patient not taking: Reported on 2/18/2025) 60 tablet 1    betamethasone dipropionate (DIPROSONE) 0.05 % cream Apply topically 2 times daily. (Patient not taking: Reported on 2/11/2025) 30 g 0    allopurinol (ZYLOPRIM) 300 MG tablet TAKE 1 TABLET BY MOUTH DAILY 30 tablet 0    Pramoxine-Menthol (GOLD BOND MAXIMUM RELIEF EX)  (Patient not taking: Reported on 2/11/2025)      hydroCHLOROthiazide (HYDRODIURIL) 25 MG tablet Take 12.5 mg by mouth daily. (Patient not taking: Reported on 2/11/2025)      prochlorperazine (COMPAZINE) 10 MG tablet Take 1 tablet by mouth every 6 hours as needed for Nausea or Vomiting. (Patient not taking: Reported on 2/11/2025) 30 tablet 5    loperamide (IMODIUM A-D) 2 MG tablet Take 2 tablets by mouth at the first sign of diarrhea, followed by 1 tablet after each loose stool. mx 4 tabs/day (Patient not taking: Reported on 2/11/2025) 24 tablet 0    sildenafil (VIAGRA) 50 MG tablet TAKE ONE-HALF TABLET BY MOUTH AS NEEDED 1 HOUR PRIOR TO SEX; NOT TO EXCEED 1 DOSE IN 24 HOURS; ** 18 DOSES PER 90 DAYS - NO EARLY REFILLS ** 1 HOUR PRIOR TO SEX; NOT TO EXCEED 1 DOSE IN 24 HOURS;   ** 18 DOSES PER 90 DAYS - NO EARLY REFILLS ** (Patient not taking: Reported on 2/11/2025)       No current facility-administered medications for this visit.           REVIEW OF SYSTEMS    GENERAL:  Patient denies headache, fevers, chills, night sweats, excessive fatigue, change in appetite, weight loss, dizziness Patient reports Saturday he was very dizzy.    ALLERGIC/IMMUNOLOGIC: Verified allergies: Yes  EYES:  Patient denies significant visual difficulties, double vision, blurred vision  ENT/MOUTH: Patient denies problems with hearing, sore throat, sinus drainage, mouth sores  ENDOCRINE:  Patient denies diabetes, thyroid disease, hormone replacement, hot flashes  HEMATOLOGIC/LYMPHATIC: Patient denies easy bruising, bleeding, tender lymph nodes, swollen lymph nodes  BREASTS: Patient denies abnormal masses of breast, nipple discharge, pain  RESPIRATORY:  Patient denies lung pain with breathing, cough, coughing up blood, shortness of breath  CARDIOVASCULAR:  Patient denies anginal chest pain, palpitations, shortness of breath when lying flat, peripheral edema  GASTROINTESTINAL: Patient denies abdominal pain , nausea, vomiting, diarrhea, GI bleeding, constipation, change in bowel habits, heartburn, sensation of feeling full, difficulty swallowing  : Patient denies blood in the urine, burning with urination, frequency, urgency, hesitancy, incontinence  MUSCULOSKELETAL:  Patient denies joint pain, bone pain, joint swelling, redness, decreased range of motion Patient reports joint pain that comes and goes. Rates the pain a 4.  SKIN:  Patient denies chronic rashes, inflammation, ulcerations, skin changes, itching  NEUROLOGIC:  Patient denies loss of balance, areas of focal weakness, abnormal gait, sensory problems, numbness, tingling  PSYCHIATRIC: Patient denies insomnia, depression, anxiety    PHYSICAL EXAM    Visit Vitals  /80 (BP Location: LUE - Left upper extremity, Patient Position: Sitting, Cuff Size: Large Adult)   Pulse 73   Temp 98.4 °F (36.9 °C) (Oral)   Resp 16   Ht 5' 10\" (1.778 m)   Wt (!) 146.5 kg (322 lb 15.6 oz)   SpO2 96%   BMI 46.34 kg/m²     ECO - No physically strenuous activity, but ambulatory and able to carry out light or sedentary work.    CONSTITUTIONAL:  Alert, cooperative, oriented.  Mood and affect appropriate.    Physical Exam  Vitals  and nursing note reviewed.   Constitutional:       Appearance: Normal appearance. He is obese.   HENT:      Head: Normocephalic and atraumatic.      Right Ear: Decreased hearing noted.      Left Ear: Decreased hearing noted.      Ears:      Comments: External auditory meatus appears normal, wearing hearing aides bilaterally      Nose: Nose normal.      Mouth/Throat:      Comments: No oral ulcers reported.  Eyes:      Extraocular Movements: Extraocular movements intact.      Pupils: Pupils are equal, round, and reactive to light.   Cardiovascular:      Rate and Rhythm: Normal rate and regular rhythm.      Heart sounds: Normal heart sounds, S1 normal and S2 normal.   Pulmonary:      Effort: Pulmonary effort is normal.      Breath sounds: Normal breath sounds and air entry.   Chest:      Comments: Tenderness reported on the left anterior chest wall. Reducible pain while pressing area. No distinct lump or mass palpated. No tenderness reported today on 2/18/2025.  Abdominal:      Palpations: Abdomen is soft. There is no hepatomegaly or splenomegaly.      Tenderness: There is no abdominal tenderness.   Musculoskeletal:         General: Normal range of motion.      Right lower leg: No edema.      Left lower leg: No edema.   Lymphadenopathy:      Cervical: No cervical adenopathy.      Upper Body:      Right upper body: No supraclavicular or axillary adenopathy.      Left upper body: No supraclavicular or axillary adenopathy.      Comments: No inguinal adenopathy reported per patient.   Skin:     General: Skin is warm.   Neurological:      General: No focal deficit present.      Mental Status: He is alert and oriented to person, place, and time.      Comments: No nuchal rigidity.    Psychiatric:         Attention and Perception: Attention and perception normal.         Mood and Affect: Mood and affect normal.          Labs:  Recent Results (from the past 24 hour(s))   Lactate Dehydrogenase    Collection Time: 02/18/25  7:37  AM    Specimen: Blood, Venous   Result Value Ref Range    LD, Total 155 86 - 234 Units/L   Comprehensive Metabolic Panel    Collection Time: 02/18/25  7:37 AM    Specimen: Blood, Venous   Result Value Ref Range    Fasting Status      Sodium 137 135 - 145 mmol/L    Potassium 4.2 3.4 - 5.1 mmol/L    Chloride 102 97 - 110 mmol/L    Carbon Dioxide 30 21 - 32 mmol/L    Anion Gap 9 7 - 19 mmol/L    Glucose 92 70 - 99 mg/dL    BUN 9 6 - 20 mg/dL    Creatinine 0.85 0.67 - 1.17 mg/dL    Glomerular Filtration Rate >90 >=60    BUN/Cr 11 7 - 25    Calcium 9.8 8.4 - 10.2 mg/dL    Bilirubin, Total 0.5 0.2 - 1.0 mg/dL    GOT/AST 33 <=37 Units/L    GPT/ALT 41 <64 Units/L    Alkaline Phosphatase 49 45 - 117 Units/L    Albumin 3.5 3.4 - 5.0 g/dL    Protein, Total 7.3 6.4 - 8.2 g/dL    Globulin 3.8 2.0 - 4.0 g/dL    A/G Ratio 0.9 (L) 1.0 - 2.4   CBC with Automated Differential (performable only)    Collection Time: 02/18/25  7:37 AM    Specimen: Blood, Venous   Result Value Ref Range    WBC 1.9 (L) 4.2 - 11.0 K/mcL    RBC 4.00 (L) 4.50 - 5.90 mil/mcL    HGB 12.6 (L) 13.0 - 17.0 g/dL    HCT 37.5 (L) 39.0 - 51.0 %    MCV 93.8 78.0 - 100.0 fl    MCH 31.5 26.0 - 34.0 pg    MCHC 33.6 32.0 - 36.5 g/dL    RDW-CV 15.3 (H) 11.0 - 15.0 %    RDW-SD 52.7 (H) 39.0 - 50.0 fL     140 - 450 K/mcL    NRBC 0 <=0 /100 WBC    Neutrophil, Percent 31 %    Lymphocytes, Percent 41 %    Mono, Percent 23 %    Eosinophils, Percent 2 %    Basophils, Percent 2 %    Immature Granulocytes 1 %    Analyzer ANC 0.6 (L) 1.8 - 7.7 K/mcl    Absolute Neutrophils 0.6 (L) 1.8 - 7.7 K/mcL    Absolute Lymphocytes 0.8 (L) 1.0 - 4.0 K/mcL    Absolute Monocytes 0.4 0.3 - 0.9 K/mcL    Absolute Eosinophils  0.0 0.0 - 0.5 K/mcL    Absolute Basophils 0.0 0.0 - 0.3 K/mcL    Absolute Immature Granulocytes 0.0 0.0 - 0.2 K/mcL     Recent Labs   Lab 02/18/25  0737 02/11/25  0717 01/14/25  0801   WBC 1.9* 1.4* 1.5*   RBC 4.00* 3.96* 3.93*   HGB 12.6* 12.4* 12.2*   HCT 37.5* 36.9*  36.3*   MCV 93.8 93.2 92.4    165 145   Absolute Neutrophils 0.6* 0.3* 0.5*   Absolute Lymphocytes 0.8* 0.9* 0.8*   Absolute Monocytes 0.4 0.2* 0.2*   Absolute Eosinophils  0.0 0.0 0.0   Absolute Basophils 0.0 0.0 0.0     Recent Labs   Lab 02/18/25  0737 02/11/25  0717 01/14/25  0801 04/18/24  1009 04/11/24  0903 02/29/24  0919 02/22/24  1055   Glucose 92 91 116*   < > 104*   < > 119*   Sodium 137 135 136   < > 136   < > 137   Potassium 4.2 4.2 4.4   < > 4.1   < > 4.2   Chloride 102 103 104   < > 106   < > 105   BUN 9 11 10   < > 12   < > 12   Creatinine 0.85 0.84 0.84   < > 0.84   < > 0.87   Calcium 9.8 9.7 9.4   < > 9.3   < > 8.9   Albumin 3.5 3.5 3.2*   < > 3.3*   < > 3.4*   GOT/AST 33 23 22   < > 22   < > 28   Alkaline Phosphatase 49 48 49   < > 47   < > 46   GPT 41 36 32   < > 25   < > 26   Uric Acid  --   --   --   --  5.5  --  5.7    < > = values in this interval not displayed.     Recent Labs   Lab 02/18/25  0737 02/11/25  0717 01/14/25  0801   Anion Gap 9 7 8   Globulin 3.8 3.6 3.4   LD, Total 155 150 146       Imaging:  No new imaging performed.     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    -09/05/2023 elevated ferritin noted withf iron profile indicative of elevated ferritin 845 serum iron of 149 and percentage iron saturation of 58% TIBC of 273.  -basic anemia workup including B12, folate, serum copper, SPEP, Brenda he and fecal occult currently pending  -peripheral smear review 09/06/2023: Erythrocytes with mild anisopoikilocytosis with few microcytes, macrocytes, ovalocytes and teardrop cells.  Few polychromatic erythrocytes seen.  Leukopenia with neutropenia.  No definite leukoblast seen.  Thrombocytopenia.    -serum electrophoresis is pending at time of  signing this document.    Low threshold for obtaining bone marrow biopsy in presence of teardrop cells in order to rule out myelodysplastic syndrome  -peripheral smear review 09/06/2023: Erythrocytes with mild anisopoikilocytosis with few microcytes, macrocytes, ovalocytes and teardrop cells.  Few polychromatic erythrocytes seen.  Leukopenia with neutropenia.  No definite leukoblast seen.  Thrombocytopenia.    -09/06/2023 ultrasound of liver consistent with diffuse fatty infiltration of the liver without any evidence of mass noted to have but noted to have gallbladder wall polyp.  -09/07/2023 patient planned on bone marrow biopsy  -peripheral smear review 09/06/2023: Erythrocytes with mild anisopoikilocytosis with few microcytes, macrocytes, ovalocytes and teardrop cells.  Few polychromatic erythrocytes seen.  Leukopenia with neutropenia.  No definite leukoblast seen.  Thrombocytopenia.    -09/06/2023 ultrasound of liver consistent with diffuse fatty infiltration of the liver without any evidence of mass noted to have but noted to have gallbladder wall polyp.  If counts continue to remain stable without any further decline then plan outpatient follow-up for bone marrow biopsy results within 1 week of discharge with MD   -BONE MARROW BIOPSY:  09/07/2023 INDICATIVE OF acute myeloid leukemia with 20-22% blast and background trilineage dysplasia with expanded myeloid blast population and bone marrow flow cytometry analysis consistent with expanded population of myeloid leukocytic blast identified at 9.3%Bone marrow evaluation demonstrates a hypercellular bone marrow for age with trilineage dysplasia and increased blasts accounting for 20 to 22% of the marrow cellularity by CD34 staining.  These findings meet the minimal criteria for diagnosis of acute myeloid leukemia  -9/12/23:Currently patient does not have hyper leukocytosis and patient currently elderly above 70 years of age and would not be a candidate for  high-intensity therapy.  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  -In previously untreated patients who were ineligible for intensive chemotherapy, overall survival was longer and the incidence of remission was higher among patients who received azacitidine plus venetoclax than among those who received azacitidine alone. The incidence of febrile neutropenia was higher in the venetoclax-azacitidine group than in the control group.    -10/24/2023 patient initiated cycle 2 day 1 of azacitidine with venetoclax.    -11/21/23 planned on c3d1 of azacytadine and venetoclax.  Patient has completed 7 days of treatment and on day 7 did report new symptoms of dizziness.    -12/05/2023 dizziness has completely resolved and patient was resumed on venetoclax and antibiotics Compazine was held.  MRI brain was negative for any acute CVA and in this scenario vertigo likely attributed due to middle ear pathology which is responding well to oral meclizine  -12/19/2023 started c4d1.    -01/16/2024 patient due for cycle 5 day 1 however cytopenia cell persistent plan to obtain bone marrow biopsy now and patient will follow-up after bone marrow biopsy in order to resume treatment in about 10 days time   -after 4 cycles repeat bone marrow biopsy on 01/18/2024 consistent with hypocellular marrow with reduced trilineage hematopoiesis negative for expanded population of myeloid blast with cytogenetic studies pending ,There is no morphologic or immunophenotypic evidence of an expanded population of myeloid blasts. .    -01/25/2024 patient proceeded with cycle 5 day 1 of venetoclax with azacitidine with venetoclax 200 mg  p.o. q.day days 3 weeks on 1 week off.    -02/22/2020 for cycle 6 day 1 of venetoclax and azacitidine was held as patient had ANC of 0.1   3/28/24 treatment held due to anc 0.0.plan to resume after 2 weeks with weekly labs   -4/11/24 cytopenia not recovering.after 6 weeks ed on bmbx -disease progression versus prolong cytopenia/marrow recovery.offered waiting however patient would like to proceede with bmbx    -4/18/24 pathology review:Hypercellular marrow with erythroid predominant trilineage hematopoiesis and less than 1% blasts .Classical cytogenetic studies pending.-Expanded population of myeloid leukocytic blasts is not identified.These results are not solely diagnostic and can only be interpreted in the context of the other data for this patient.  -4/23/24 treatment held for 1 week .plan to dose reduce azacytadineto 37.5mg/m2 and continue continue venetoclax 200 mg po three weeks on and one week off.  Patient was noted to have rare blastin escobar coat on peripheral smear.Anemia, macrocytic, hyperchromic with anisocytosis. Rare nRBCs. Mild thrombocytopenia. Leukopenia. Examination of buffy coat shows a few immature myeloid cells including rare blasts (<1%)    -4/30/2024 patient initiated on cycle 7-day 1 with azacitidine 37.5 mg/m² and was continued on venetoclax 200 mg p.o. daily for 3 weeks on 1 week off.  If the patient tolerates this dose well then can consider increasing the dose of his decitabine to 50 mg/m²   -06/25/2024 patient initiated on cycle 9 day 1 of azacitidine 37.5 mg per m2 and was continued on venetoclax 200 mg p.o. daily for 3 weeks on and 1 week off   -7/23/2024 patient initiated on cycle 10-day 1 of azacitidine 37.5 mg/m² and was continued on venetoclax 200 mg p.o. daily for 3 weeks on 1 week off.  ANC was noted at 500  -8/20/24 patient presents for cycle 11-day 1 of azacitidine 37.5 mg/m² and venetoclax 200 mg p.o. daily 3 weeks on 1 week off.  No significant toxicity reported as having  low cell count with ANC of 0.7 however adequate to initiate treatment-  -9/18/2024 patient proceeded with cycle 12-day 1 of azacitidine 37.5 mg/m² and venetoclax 200 mg p.o. daily 3 weeks on 1 week off.  No significant toxicity reported as having low cell count with ANC of 0.7 however adequate to initiate treatment  --10/22/24 patient planned on cycle 12 day 1 of azacitidine 37.5 mg/m² and venetoclax 200 mg p.o. daily 3 weeks on 1 week off.  -10/22/24 patient planned on cycle 13 day 1 of azacitidine 37.5 mg/m² and venetoclax 200 mg p.o. daily 3 weeks on 1 week off.  -11/19/24 planned on cycle 14 day 1 of azacitidine 37.5 mg/m² and venetoclax 200 mg p.o. daily 3 weeks on 1 week off.  Patient will proceed with cycle with adequate counts and ANC above 500  -12/17/24 planned on cycle 15 day 1 ofazacitidine 37.5 mg/m² and venetoclax 200 mg p.o. daily 3 weeks on 1 week off.  -1/14/2025 proceeded with cycle 16-day 1 of azacitidine 37.5 and venetoclax 200 mg p.o. daily 3-week on 1 week off  -2/10/25 planned on cycle 17 day 1 of azacytadine 37.5 and venetoclax 200 mg p.o. daily 3-week on 1 week off.  However ANC noted to be 0.3 and therefore treatment was held.  -2/18/2025 patient proceeded with neck cycle of azacitidine and venetoclax     -patient would like to hold bactrim he thinks he has dizziness due to pills   If a subject achieves CRi or has a morphologically leukemia free bone marrow (MLFS)  after completion of Cycle 1, venetoclax/placebo may be interrupted to allow for ANC  recovery from Day 29 until ANC >= 500/?L or up to 14 days. Cycle 2 administration of  azacitidine will also be delayed until ANC >= 500/?L. Both venetoclax/placebo and  azacitidine will resume on the same day after the interruption. If a subject presents with  new onset Grade 4 neutropenia for more than 1 week during subsequent cycles, unless it  is thought to be due to the underlying disease, venetoclax/placebo dosing may be  interrupted until  ANC is >= 500/?L. After Cycle 3, for subjects in CR/CRi who required  interruption or delay of study drug administration for Cytopenia (neutropenia or  thrombocytopenia) venetoclax/placebo may be administered for 21 days out of 28 days  during each of the subsequent cycles     \Discussed oral chemotherapy adherence and side effects with patient.  Patient is taking medication as prescribed.        2) ALCOHOL CONSUMPTION  reports 2-5 mixed whiskey drinks per day  -rarely goes for an extended period without drinking  -encourage cessation from alcohol.    -ultrasound of liver requested for ruling out hepatic pathology contributing to cytopenias  -ultrasound negative for any nodule or mass and consistent with fatty changes in the liver  -09/06/2023 ultrasound of liver consistent with diffuse fatty infiltration of the liver without any evidence of mass noted to have but noted to have gallbladder wall polyp.    - 10/12/2023 patient reports discontinuation of alcohol  -7/23/24:occasinally reported  -8/20/24 occasional alcohol reported  -11/19/24 reports occasionally alcohol  -2/18/25 occasionally        3) CYTOPENIAS  - 10/12/2023 cytopenia secondary to treatment and AML however plan to currently continue with treatment in absence of remission with ongoing supportive transfusion.  -11/19/2024 mild cytopenias in setting of treatment however adequate to proceed with neck cycle of azacitidine with venetoclax        4) SKIN RASH  -improving when seen patient on 10/12/2023  -11/21/23 rash has improved on abdomen   -none reported on 7/23/24  -none reported on10/22/24  -none reported on 11/19/24   -1/14/25 no rashes reported         5) PSYCHOSOCIAL  - spouse with malignancy getting care at [facility] in [place].    - patient is a resident of [place] and is agreeable to continue follow-up at [facility]     6) DIZZINESS  -new symptom patient has dizziness mostly associated with movement of the neck however  is new and CT imaging is negative for any acute intracranial finding and will plan on obtaining MRI imaging currently.    -patient does report some relief with labyrinthitie sedatives however symptoms still persist.    -discussed differentials including middle ear cause of vertigo, cerebrovascular accident, medication side effect venetoclax does have about 14% chances of causing vertigo.  Patient given azacitidine IV this cycle after discussion with nurse practitioner however unlikely to contribute to dizziness.    -no nuchal rigidity to suggest meningeal irritation.  Patient has not developed any fever  Patient is scheduled for MR imaging 12/02/2023.    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  No acute CVA identified currently  -no dizziness  None reported on 7/23/24  -8/20/24 reported dizziness5 times.nol fall  -takes meclizine reported on 10/22/24  -11/19/24 doing physical therapy patient continues to get dizzy however does report neck exercises are helping him somewhat  -12/17/24 comes and goes .patient did therapy .patient   -12/17/2024 patient thinks Bactrim is contributing to his dizziness and would like to hold discussed with the patient role of Bactrim as prophylaxis however patient would like to hold it we will hold it for a week if he does not feel improvement he will resume it        7) PSYCHOSOCIAL  -Spouse battling cancer patient involved in care.  -On 4/30/2024 spoke with daughter of the patient was present during the clinic visit over the phone        8) LEFT CHEST PAIN  -reported on 9/18/24  -no mass or lump  palpated.  Reproducable on deep palpation  -likely musculo skletal pain monitor for now  -resolved chest pain on 11/19/24 does not report any chest pain on 11/19/2024  -no chest pain on 2/18/25                     PLAN:  proceede with treatment today: ANC 0.6:azacytadine 37.5 mg/m2  and venetoclax 200 mg po q day 3 weeks on and 1 week off.with anc of 0.3  -follow up4 week with cbc,cmp ldh with md for next cycle of azacytadine 37.5 mg/m2  and venetoclax 200 mg po q day 3 weeks on and 1   -contiue levofloxacin,fluconazole and acyclovir    Jesus was seen today for cancer.    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 2/18/2025, 8:57 AM.      The documentation recorded by the scribe accurately and completely reflects the service(s) I personally performed and the decisions made by me.  The longitudinal plan of care for patient's condition was addressed during this visit due to added complexity in care I will continue to support the patient in the subsequent management of this condition and with ongoing continuity of care of this condition.    In the interest of transparency, the 21st Century Cures Act requires healthcare organizations to make nearly all medical information readily available to patients. Please remember that this document is intended as a form of “[id number]” communication. Often medical records contain verbiage and abbreviations that might be unfamiliar and without context. Language may appear direct as it is intended to succinctly relay the clinical opinion of the practitione